# Patient Record
Sex: MALE | Race: BLACK OR AFRICAN AMERICAN | Employment: OTHER | URBAN - METROPOLITAN AREA
[De-identification: names, ages, dates, MRNs, and addresses within clinical notes are randomized per-mention and may not be internally consistent; named-entity substitution may affect disease eponyms.]

---

## 2017-01-01 ENCOUNTER — HOSPITAL ENCOUNTER (OUTPATIENT)
Dept: LAB | Age: 82
Discharge: HOME | End: 2017-06-29
Payer: MEDICARE

## 2017-01-01 ENCOUNTER — APPOINTMENT (OUTPATIENT)
Dept: CT IMAGING | Age: 82
DRG: 061 | End: 2017-01-01
Attending: EMERGENCY MEDICINE
Payer: MEDICARE

## 2017-01-01 ENCOUNTER — PATIENT OUTREACH (OUTPATIENT)
Dept: CARDIOLOGY CLINIC | Age: 82
End: 2017-01-01

## 2017-01-01 ENCOUNTER — HOSPITAL ENCOUNTER (INPATIENT)
Age: 82
LOS: 4 days | DRG: 061 | End: 2017-07-28
Attending: EMERGENCY MEDICINE | Admitting: INTERNAL MEDICINE
Payer: MEDICARE

## 2017-01-01 ENCOUNTER — HOSPITAL ENCOUNTER (INPATIENT)
Age: 82
LOS: 3 days | Discharge: HOME | DRG: 291 | End: 2017-04-12
Attending: EMERGENCY MEDICINE | Admitting: INTERNAL MEDICINE
Payer: MEDICARE

## 2017-01-01 ENCOUNTER — APPOINTMENT (OUTPATIENT)
Dept: CT IMAGING | Age: 82
DRG: 061 | End: 2017-01-01
Attending: PSYCHIATRY & NEUROLOGY
Payer: MEDICARE

## 2017-01-01 ENCOUNTER — APPOINTMENT (OUTPATIENT)
Dept: GENERAL RADIOLOGY | Age: 82
DRG: 291 | End: 2017-01-01
Attending: EMERGENCY MEDICINE
Payer: MEDICARE

## 2017-01-01 ENCOUNTER — TELEPHONE (OUTPATIENT)
Dept: INTERNAL MEDICINE CLINIC | Age: 82
End: 2017-01-01

## 2017-01-01 ENCOUNTER — APPOINTMENT (OUTPATIENT)
Dept: ULTRASOUND IMAGING | Age: 82
DRG: 291 | End: 2017-01-01
Attending: INTERNAL MEDICINE
Payer: MEDICARE

## 2017-01-01 ENCOUNTER — APPOINTMENT (OUTPATIENT)
Dept: GENERAL RADIOLOGY | Age: 82
DRG: 061 | End: 2017-01-01
Attending: INTERNAL MEDICINE
Payer: MEDICARE

## 2017-01-01 ENCOUNTER — CLINICAL SUPPORT (OUTPATIENT)
Dept: CARDIOLOGY CLINIC | Age: 82
End: 2017-01-01

## 2017-01-01 ENCOUNTER — APPOINTMENT (OUTPATIENT)
Dept: GENERAL RADIOLOGY | Age: 82
DRG: 061 | End: 2017-01-01
Attending: EMERGENCY MEDICINE
Payer: MEDICARE

## 2017-01-01 ENCOUNTER — HOSPICE ADMISSION (OUTPATIENT)
Dept: HOSPICE | Facility: HOSPICE | Age: 82
End: 2017-01-01

## 2017-01-01 ENCOUNTER — OFFICE VISIT (OUTPATIENT)
Dept: CARDIOLOGY CLINIC | Age: 82
End: 2017-01-01

## 2017-01-01 ENCOUNTER — OFFICE VISIT (OUTPATIENT)
Dept: INTERNAL MEDICINE CLINIC | Age: 82
End: 2017-01-01

## 2017-01-01 ENCOUNTER — APPOINTMENT (OUTPATIENT)
Dept: NUCLEAR MEDICINE | Age: 82
DRG: 291 | End: 2017-01-01
Attending: INTERNAL MEDICINE
Payer: MEDICARE

## 2017-01-01 ENCOUNTER — HOSPITAL ENCOUNTER (OUTPATIENT)
Dept: ULTRASOUND IMAGING | Age: 82
Discharge: HOME | End: 2017-01-20
Attending: INTERNAL MEDICINE
Payer: MEDICARE

## 2017-01-01 VITALS
OXYGEN SATURATION: 98 % | DIASTOLIC BLOOD PRESSURE: 70 MMHG | WEIGHT: 136 LBS | BODY MASS INDEX: 21.86 KG/M2 | SYSTOLIC BLOOD PRESSURE: 110 MMHG | RESPIRATION RATE: 18 BRPM | HEIGHT: 66 IN | HEART RATE: 80 BPM

## 2017-01-01 VITALS
TEMPERATURE: 98.2 F | HEIGHT: 66 IN | RESPIRATION RATE: 18 BRPM | OXYGEN SATURATION: 97 % | HEART RATE: 72 BPM | BODY MASS INDEX: 21.33 KG/M2 | DIASTOLIC BLOOD PRESSURE: 69 MMHG | WEIGHT: 132.72 LBS | SYSTOLIC BLOOD PRESSURE: 102 MMHG

## 2017-01-01 VITALS — WEIGHT: 136 LBS | BODY MASS INDEX: 21.95 KG/M2

## 2017-01-01 VITALS
RESPIRATION RATE: 18 BRPM | BODY MASS INDEX: 22.16 KG/M2 | OXYGEN SATURATION: 98 % | HEART RATE: 70 BPM | DIASTOLIC BLOOD PRESSURE: 65 MMHG | TEMPERATURE: 97.8 F | WEIGHT: 133 LBS | HEIGHT: 65 IN | SYSTOLIC BLOOD PRESSURE: 105 MMHG

## 2017-01-01 VITALS
BODY MASS INDEX: 21.94 KG/M2 | DIASTOLIC BLOOD PRESSURE: 54 MMHG | SYSTOLIC BLOOD PRESSURE: 94 MMHG | RESPIRATION RATE: 18 BRPM | WEIGHT: 136.5 LBS | HEART RATE: 68 BPM | OXYGEN SATURATION: 98 % | HEIGHT: 66 IN

## 2017-01-01 VITALS
BODY MASS INDEX: 22.02 KG/M2 | HEART RATE: 75 BPM | SYSTOLIC BLOOD PRESSURE: 90 MMHG | OXYGEN SATURATION: 98 % | WEIGHT: 137 LBS | RESPIRATION RATE: 16 BRPM | DIASTOLIC BLOOD PRESSURE: 40 MMHG | HEIGHT: 66 IN

## 2017-01-01 VITALS
OXYGEN SATURATION: 98 % | SYSTOLIC BLOOD PRESSURE: 98 MMHG | BODY MASS INDEX: 21.38 KG/M2 | WEIGHT: 133 LBS | RESPIRATION RATE: 18 BRPM | DIASTOLIC BLOOD PRESSURE: 61 MMHG | HEIGHT: 66 IN | HEART RATE: 76 BPM | TEMPERATURE: 97.7 F

## 2017-01-01 VITALS — BODY MASS INDEX: 21.95 KG/M2 | WEIGHT: 136 LBS

## 2017-01-01 VITALS
RESPIRATION RATE: 24 BRPM | DIASTOLIC BLOOD PRESSURE: 76 MMHG | TEMPERATURE: 96.5 F | HEART RATE: 130 BPM | HEIGHT: 69 IN | SYSTOLIC BLOOD PRESSURE: 132 MMHG | BODY MASS INDEX: 21.39 KG/M2 | WEIGHT: 144.4 LBS | OXYGEN SATURATION: 75 %

## 2017-01-01 DIAGNOSIS — I50.22 CHRONIC SYSTOLIC HF (HEART FAILURE) (HCC): Primary | ICD-10-CM

## 2017-01-01 DIAGNOSIS — R53.1 WEAKNESS: ICD-10-CM

## 2017-01-01 DIAGNOSIS — E55.9 VITAMIN D DEFICIENCY: ICD-10-CM

## 2017-01-01 DIAGNOSIS — I49.5 SSS (SICK SINUS SYNDROME) (HCC): Chronic | ICD-10-CM

## 2017-01-01 DIAGNOSIS — N18.3 CKD (CHRONIC KIDNEY DISEASE), STAGE 3 (MODERATE): ICD-10-CM

## 2017-01-01 DIAGNOSIS — E11.9 TYPE 2 DIABETES MELLITUS WITHOUT COMPLICATION, UNSPECIFIED LONG TERM INSULIN USE STATUS: ICD-10-CM

## 2017-01-01 DIAGNOSIS — I10 ESSENTIAL HYPERTENSION: ICD-10-CM

## 2017-01-01 DIAGNOSIS — Z95.0 PACEMAKER: ICD-10-CM

## 2017-01-01 DIAGNOSIS — Z71.89 GOALS OF CARE, COUNSELING/DISCUSSION: ICD-10-CM

## 2017-01-01 DIAGNOSIS — E78.2 MIXED HYPERLIPIDEMIA: ICD-10-CM

## 2017-01-01 DIAGNOSIS — I48.0 PAROXYSMAL ATRIAL FIBRILLATION (HCC): Primary | Chronic | ICD-10-CM

## 2017-01-01 DIAGNOSIS — N18.30 TYPE 2 DM WITH CKD STAGE 3 AND HYPERTENSION (HCC): ICD-10-CM

## 2017-01-01 DIAGNOSIS — B35.4 TINEA CORPORIS: Primary | ICD-10-CM

## 2017-01-01 DIAGNOSIS — I48.0 PAROXYSMAL ATRIAL FIBRILLATION (HCC): Chronic | ICD-10-CM

## 2017-01-01 DIAGNOSIS — E11.22 TYPE 2 DM WITH CKD STAGE 3 AND HYPERTENSION (HCC): ICD-10-CM

## 2017-01-01 DIAGNOSIS — I49.3 PVC (PREMATURE VENTRICULAR CONTRACTION): Chronic | ICD-10-CM

## 2017-01-01 DIAGNOSIS — R60.9 EDEMA, UNSPECIFIED TYPE: ICD-10-CM

## 2017-01-01 DIAGNOSIS — E11.42 DIABETIC PERIPHERAL NEUROPATHY ASSOCIATED WITH TYPE 2 DIABETES MELLITUS (HCC): ICD-10-CM

## 2017-01-01 DIAGNOSIS — Z71.89 ACP (ADVANCE CARE PLANNING): ICD-10-CM

## 2017-01-01 DIAGNOSIS — I63.9 CEREBROVASCULAR ACCIDENT (CVA), UNSPECIFIED MECHANISM (HCC): Primary | ICD-10-CM

## 2017-01-01 DIAGNOSIS — I48.91 ATRIAL FIBRILLATION, UNSPECIFIED TYPE (HCC): Chronic | ICD-10-CM

## 2017-01-01 DIAGNOSIS — I50.21 ACUTE SYSTOLIC CONGESTIVE HEART FAILURE (HCC): Primary | ICD-10-CM

## 2017-01-01 DIAGNOSIS — Z71.89 COUNSELING REGARDING ADVANCED CARE PLANNING AND GOALS OF CARE: ICD-10-CM

## 2017-01-01 DIAGNOSIS — R06.02 SHORTNESS OF BREATH: ICD-10-CM

## 2017-01-01 DIAGNOSIS — N18.4 CHRONIC KIDNEY DISEASE, STAGE IV (SEVERE) (HCC): ICD-10-CM

## 2017-01-01 DIAGNOSIS — I50.21 SYSTOLIC CHF, ACUTE (HCC): ICD-10-CM

## 2017-01-01 DIAGNOSIS — I12.9 TYPE 2 DM WITH CKD STAGE 3 AND HYPERTENSION (HCC): ICD-10-CM

## 2017-01-01 DIAGNOSIS — I48.0 PAF (PAROXYSMAL ATRIAL FIBRILLATION) (HCC): ICD-10-CM

## 2017-01-01 DIAGNOSIS — I49.5 SSS (SICK SINUS SYNDROME) (HCC): Primary | Chronic | ICD-10-CM

## 2017-01-01 DIAGNOSIS — Z95.0 PACEMAKER: Primary | ICD-10-CM

## 2017-01-01 DIAGNOSIS — R53.81 DEBILITY: ICD-10-CM

## 2017-01-01 DIAGNOSIS — I50.22 CHRONIC SYSTOLIC CONGESTIVE HEART FAILURE (HCC): ICD-10-CM

## 2017-01-01 DIAGNOSIS — J96.01 ACUTE RESPIRATORY FAILURE WITH HYPOXIA (HCC): ICD-10-CM

## 2017-01-01 LAB
ALBUMIN SERPL BCP-MCNC: 3.3 G/DL (ref 3.5–5)
ALBUMIN SERPL BCP-MCNC: 3.5 G/DL (ref 3.5–5)
ALBUMIN/GLOB SERPL: 0.9 {RATIO} (ref 1.1–2.2)
ALBUMIN/GLOB SERPL: 0.9 {RATIO} (ref 1.1–2.2)
ALP SERPL-CCNC: 109 U/L (ref 45–117)
ALP SERPL-CCNC: 116 U/L (ref 45–117)
ALT SERPL-CCNC: 34 U/L (ref 12–78)
ALT SERPL-CCNC: 43 U/L (ref 12–78)
ANION GAP BLD CALC-SCNC: 11 MMOL/L (ref 5–15)
ANION GAP BLD CALC-SCNC: 12 MMOL/L (ref 5–15)
ANION GAP BLD CALC-SCNC: 13 MMOL/L (ref 5–15)
ANION GAP BLD CALC-SCNC: 7 MMOL/L (ref 5–15)
ANION GAP BLD CALC-SCNC: 9 MMOL/L (ref 5–15)
APPEARANCE UR: CLEAR
APPEARANCE UR: CLEAR
APTT PPP: 27.3 SEC (ref 22.1–32.5)
ARTERIAL PATENCY WRIST A: YES
AST SERPL W P-5'-P-CCNC: 22 U/L (ref 15–37)
AST SERPL W P-5'-P-CCNC: 41 U/L (ref 15–37)
ATRIAL RATE: 58 BPM
ATRIAL RATE: 72 BPM
BACTERIA URNS QL MICRO: NEGATIVE /HPF
BACTERIA URNS QL MICRO: NEGATIVE /HPF
BASE DEFICIT BLDA-SCNC: 1.5 MMOL/L
BASOPHILS # BLD AUTO: 0 K/UL
BASOPHILS # BLD AUTO: 0 K/UL (ref 0–0.1)
BASOPHILS # BLD AUTO: 0 K/UL (ref 0–0.1)
BASOPHILS # BLD: 0 %
BASOPHILS # BLD: 0 % (ref 0–1)
BASOPHILS # BLD: 0 % (ref 0–1)
BDY SITE: ABNORMAL
BILIRUB SERPL-MCNC: 0.4 MG/DL (ref 0.2–1)
BILIRUB SERPL-MCNC: 0.8 MG/DL (ref 0.2–1)
BILIRUB UR QL: NEGATIVE
BILIRUB UR QL: NEGATIVE
BNP SERPL-MCNC: 4882 PG/ML (ref 0–450)
BREATHS.SPONTANEOUS ON VENT: 26
BUN SERPL-MCNC: 27 MG/DL (ref 6–20)
BUN SERPL-MCNC: 27 MG/DL (ref 6–20)
BUN SERPL-MCNC: 28 MG/DL (ref 6–20)
BUN SERPL-MCNC: 30 MG/DL (ref 6–20)
BUN SERPL-MCNC: 33 MG/DL (ref 6–20)
BUN SERPL-MCNC: 33 MG/DL (ref 6–20)
BUN SERPL-MCNC: 34 MG/DL (ref 10–36)
BUN SERPL-MCNC: 43 MG/DL (ref 6–20)
BUN/CREAT SERPL: 13 (ref 12–20)
BUN/CREAT SERPL: 14 (ref 12–20)
BUN/CREAT SERPL: 17 (ref 10–24)
BUN/CREAT SERPL: 19 (ref 12–20)
CALCIUM SERPL-MCNC: 8.4 MG/DL (ref 8.5–10.1)
CALCIUM SERPL-MCNC: 8.5 MG/DL (ref 8.5–10.1)
CALCIUM SERPL-MCNC: 8.5 MG/DL (ref 8.5–10.1)
CALCIUM SERPL-MCNC: 8.6 MG/DL (ref 8.5–10.1)
CALCIUM SERPL-MCNC: 8.7 MG/DL (ref 8.5–10.1)
CALCIUM SERPL-MCNC: 8.7 MG/DL (ref 8.5–10.1)
CALCIUM SERPL-MCNC: 8.8 MG/DL (ref 8.5–10.1)
CALCIUM SERPL-MCNC: 9.4 MG/DL (ref 8.6–10.2)
CALCULATED R AXIS, ECG10: 24 DEGREES
CALCULATED R AXIS, ECG10: 41 DEGREES
CALCULATED T AXIS, ECG11: -63 DEGREES
CALCULATED T AXIS, ECG11: -99 DEGREES
CHLORIDE SERPL-SCNC: 100 MMOL/L (ref 97–108)
CHLORIDE SERPL-SCNC: 103 MMOL/L (ref 96–106)
CHLORIDE SERPL-SCNC: 104 MMOL/L (ref 97–108)
CHLORIDE SERPL-SCNC: 105 MMOL/L (ref 97–108)
CHLORIDE SERPL-SCNC: 106 MMOL/L (ref 97–108)
CHLORIDE SERPL-SCNC: 107 MMOL/L (ref 97–108)
CHLORIDE SERPL-SCNC: 110 MMOL/L (ref 97–108)
CHLORIDE SERPL-SCNC: 112 MMOL/L (ref 97–108)
CK MB CFR SERPL CALC: 2.2 % (ref 0–2.5)
CK MB SERPL-MCNC: 2.5 NG/ML (ref 5–25)
CK SERPL-CCNC: 114 U/L (ref 39–308)
CO2 SERPL-SCNC: 19 MMOL/L (ref 21–32)
CO2 SERPL-SCNC: 22 MMOL/L (ref 21–32)
CO2 SERPL-SCNC: 23 MMOL/L (ref 18–29)
CO2 SERPL-SCNC: 23 MMOL/L (ref 21–32)
CO2 SERPL-SCNC: 23 MMOL/L (ref 21–32)
CO2 SERPL-SCNC: 25 MMOL/L (ref 21–32)
CO2 SERPL-SCNC: 27 MMOL/L (ref 21–32)
CO2 SERPL-SCNC: 29 MMOL/L (ref 21–32)
COLOR UR: ABNORMAL
COLOR UR: NORMAL
CREAT SERPL-MCNC: 1.91 MG/DL (ref 0.7–1.3)
CREAT SERPL-MCNC: 1.96 MG/DL (ref 0.7–1.3)
CREAT SERPL-MCNC: 2.05 MG/DL (ref 0.76–1.27)
CREAT SERPL-MCNC: 2.05 MG/DL (ref 0.7–1.3)
CREAT SERPL-MCNC: 2.1 MG/DL (ref 0.7–1.3)
CREAT SERPL-MCNC: 2.29 MG/DL (ref 0.7–1.3)
CREAT SERPL-MCNC: 2.33 MG/DL (ref 0.7–1.3)
CREAT SERPL-MCNC: 2.34 MG/DL (ref 0.7–1.3)
DIAGNOSIS, 93000: NORMAL
DIAGNOSIS, 93000: NORMAL
DIFFERENTIAL METHOD BLD: ABNORMAL
EOSINOPHIL # BLD: 0 K/UL
EOSINOPHIL # BLD: 0.1 K/UL (ref 0–0.4)
EOSINOPHIL # BLD: 0.1 K/UL (ref 0–0.4)
EOSINOPHIL NFR BLD: 0 %
EOSINOPHIL NFR BLD: 1 % (ref 0–7)
EOSINOPHIL NFR BLD: 1 % (ref 0–7)
EPITH CASTS URNS QL MICRO: ABNORMAL /LPF
EPITH CASTS URNS QL MICRO: NORMAL /LPF
ERYTHROCYTE [DISTWIDTH] IN BLOOD BY AUTOMATED COUNT: 14.9 % (ref 11.5–14.5)
ERYTHROCYTE [DISTWIDTH] IN BLOOD BY AUTOMATED COUNT: 15.1 % (ref 11.5–14.5)
ERYTHROCYTE [DISTWIDTH] IN BLOOD BY AUTOMATED COUNT: 15.1 % (ref 11.5–14.5)
ERYTHROCYTE [DISTWIDTH] IN BLOOD BY AUTOMATED COUNT: 15.2 % (ref 11.5–14.5)
FIO2 ON VENT: 100 %
GAS FLOW.O2 O2 DELIVERY SYS: 15 L/MIN
GLOBULIN SER CALC-MCNC: 3.7 G/DL (ref 2–4)
GLOBULIN SER CALC-MCNC: 3.8 G/DL (ref 2–4)
GLUCOSE BLD STRIP.AUTO-MCNC: 101 MG/DL (ref 65–100)
GLUCOSE BLD STRIP.AUTO-MCNC: 102 MG/DL (ref 65–100)
GLUCOSE BLD STRIP.AUTO-MCNC: 103 MG/DL (ref 65–100)
GLUCOSE BLD STRIP.AUTO-MCNC: 108 MG/DL (ref 65–100)
GLUCOSE BLD STRIP.AUTO-MCNC: 109 MG/DL (ref 65–100)
GLUCOSE BLD STRIP.AUTO-MCNC: 111 MG/DL (ref 65–100)
GLUCOSE BLD STRIP.AUTO-MCNC: 111 MG/DL (ref 65–100)
GLUCOSE BLD STRIP.AUTO-MCNC: 112 MG/DL (ref 65–100)
GLUCOSE BLD STRIP.AUTO-MCNC: 115 MG/DL (ref 65–100)
GLUCOSE BLD STRIP.AUTO-MCNC: 120 MG/DL (ref 65–100)
GLUCOSE BLD STRIP.AUTO-MCNC: 129 MG/DL (ref 65–100)
GLUCOSE BLD STRIP.AUTO-MCNC: 132 MG/DL (ref 65–100)
GLUCOSE BLD STRIP.AUTO-MCNC: 139 MG/DL (ref 65–100)
GLUCOSE BLD STRIP.AUTO-MCNC: 147 MG/DL (ref 65–100)
GLUCOSE BLD STRIP.AUTO-MCNC: 154 MG/DL (ref 65–100)
GLUCOSE BLD STRIP.AUTO-MCNC: 158 MG/DL (ref 65–100)
GLUCOSE BLD STRIP.AUTO-MCNC: 160 MG/DL (ref 65–100)
GLUCOSE BLD STRIP.AUTO-MCNC: 160 MG/DL (ref 65–100)
GLUCOSE BLD STRIP.AUTO-MCNC: 167 MG/DL (ref 65–100)
GLUCOSE BLD STRIP.AUTO-MCNC: 172 MG/DL (ref 65–100)
GLUCOSE BLD STRIP.AUTO-MCNC: 62 MG/DL (ref 65–100)
GLUCOSE BLD STRIP.AUTO-MCNC: 70 MG/DL (ref 65–100)
GLUCOSE BLD STRIP.AUTO-MCNC: 72 MG/DL (ref 65–100)
GLUCOSE BLD STRIP.AUTO-MCNC: 73 MG/DL (ref 65–100)
GLUCOSE BLD STRIP.AUTO-MCNC: 74 MG/DL (ref 65–100)
GLUCOSE BLD STRIP.AUTO-MCNC: 76 MG/DL (ref 65–100)
GLUCOSE BLD STRIP.AUTO-MCNC: 77 MG/DL (ref 65–100)
GLUCOSE BLD STRIP.AUTO-MCNC: 78 MG/DL (ref 65–100)
GLUCOSE BLD STRIP.AUTO-MCNC: 78 MG/DL (ref 65–100)
GLUCOSE BLD STRIP.AUTO-MCNC: 86 MG/DL (ref 65–100)
GLUCOSE BLD STRIP.AUTO-MCNC: 87 MG/DL (ref 65–100)
GLUCOSE BLD STRIP.AUTO-MCNC: 92 MG/DL (ref 65–100)
GLUCOSE BLD STRIP.AUTO-MCNC: 92 MG/DL (ref 65–100)
GLUCOSE BLD STRIP.AUTO-MCNC: 97 MG/DL (ref 65–100)
GLUCOSE BLD STRIP.AUTO-MCNC: 99 MG/DL (ref 65–100)
GLUCOSE SERPL-MCNC: 121 MG/DL (ref 65–99)
GLUCOSE SERPL-MCNC: 151 MG/DL (ref 65–100)
GLUCOSE SERPL-MCNC: 68 MG/DL (ref 65–100)
GLUCOSE SERPL-MCNC: 83 MG/DL (ref 65–100)
GLUCOSE SERPL-MCNC: 84 MG/DL (ref 65–100)
GLUCOSE SERPL-MCNC: 85 MG/DL (ref 65–100)
GLUCOSE SERPL-MCNC: 90 MG/DL (ref 65–100)
GLUCOSE SERPL-MCNC: 99 MG/DL (ref 65–100)
GLUCOSE UR STRIP.AUTO-MCNC: NEGATIVE MG/DL
GLUCOSE UR STRIP.AUTO-MCNC: NEGATIVE MG/DL
HCO3 BLDA-SCNC: 21 MMOL/L (ref 22–26)
HCT VFR BLD AUTO: 37.9 % (ref 36.6–50.3)
HCT VFR BLD AUTO: 39.5 % (ref 36.6–50.3)
HCT VFR BLD AUTO: 41.2 % (ref 36.6–50.3)
HCT VFR BLD AUTO: 41.4 % (ref 36.6–50.3)
HGB BLD-MCNC: 12.4 G/DL (ref 12.1–17)
HGB BLD-MCNC: 13.1 G/DL (ref 12.1–17)
HGB BLD-MCNC: 13.2 G/DL (ref 12.1–17)
HGB BLD-MCNC: 13.3 G/DL (ref 12.1–17)
HGB UR QL STRIP: ABNORMAL
HGB UR QL STRIP: NEGATIVE
HYALINE CASTS URNS QL MICRO: ABNORMAL /LPF (ref 0–5)
HYALINE CASTS URNS QL MICRO: NORMAL /LPF (ref 0–5)
INR PPP: 1.1 (ref 0.9–1.1)
INTERPRETATION: NORMAL
KETONES UR QL STRIP.AUTO: NEGATIVE MG/DL
KETONES UR QL STRIP.AUTO: NEGATIVE MG/DL
LACTATE SERPL-SCNC: 1.3 MMOL/L (ref 0.4–2)
LEUKOCYTE ESTERASE UR QL STRIP.AUTO: NEGATIVE
LEUKOCYTE ESTERASE UR QL STRIP.AUTO: NEGATIVE
LYMPHOCYTES # BLD AUTO: 17 %
LYMPHOCYTES # BLD AUTO: 18 % (ref 12–49)
LYMPHOCYTES # BLD AUTO: 37 % (ref 12–49)
LYMPHOCYTES # BLD: 1.4 K/UL (ref 0.8–3.5)
LYMPHOCYTES # BLD: 2.5 K/UL
LYMPHOCYTES # BLD: 2.8 K/UL (ref 0.8–3.5)
Lab: NORMAL
MAGNESIUM SERPL-MCNC: 2 MG/DL (ref 1.6–2.4)
MAGNESIUM SERPL-MCNC: 2.2 MG/DL (ref 1.6–2.3)
MAGNESIUM SERPL-MCNC: 2.3 MG/DL (ref 1.6–2.4)
MCH RBC QN AUTO: 26.7 PG (ref 26–34)
MCH RBC QN AUTO: 26.7 PG (ref 26–34)
MCH RBC QN AUTO: 26.9 PG (ref 26–34)
MCH RBC QN AUTO: 27.2 PG (ref 26–34)
MCHC RBC AUTO-ENTMCNC: 32 G/DL (ref 30–36.5)
MCHC RBC AUTO-ENTMCNC: 32.1 G/DL (ref 30–36.5)
MCHC RBC AUTO-ENTMCNC: 32.7 G/DL (ref 30–36.5)
MCHC RBC AUTO-ENTMCNC: 33.2 G/DL (ref 30–36.5)
MCV RBC AUTO: 81.7 FL (ref 80–99)
MCV RBC AUTO: 82.1 FL (ref 80–99)
MCV RBC AUTO: 83.4 FL (ref 80–99)
MCV RBC AUTO: 83.6 FL (ref 80–99)
METAMYELOCYTES NFR BLD MANUAL: 9 %
MONOCYTES # BLD: 0.4 K/UL
MONOCYTES # BLD: 0.5 K/UL (ref 0–1)
MONOCYTES # BLD: 1.4 K/UL (ref 0–1)
MONOCYTES NFR BLD AUTO: 18 % (ref 5–13)
MONOCYTES NFR BLD AUTO: 3 %
MONOCYTES NFR BLD AUTO: 7 % (ref 5–13)
NEUTS BAND NFR BLD MANUAL: 19 %
NEUTS SEG # BLD: 10.4 K/UL
NEUTS SEG # BLD: 4.1 K/UL (ref 1.8–8)
NEUTS SEG # BLD: 4.8 K/UL (ref 1.8–8)
NEUTS SEG NFR BLD AUTO: 52 %
NEUTS SEG NFR BLD AUTO: 55 % (ref 32–75)
NEUTS SEG NFR BLD AUTO: 63 % (ref 32–75)
NITRITE UR QL STRIP.AUTO: NEGATIVE
NITRITE UR QL STRIP.AUTO: NEGATIVE
NRBC # BLD: 0 K/UL (ref 0–0.01)
NRBC BLD-RTO: 0 PER 100 WBC
P-R INTERVAL, ECG05: 192 MS
P-R INTERVAL, ECG05: 224 MS
PCO2 BLDA: 31 MMHG (ref 35–45)
PH BLDA: 7.45 [PH] (ref 7.35–7.45)
PH UR STRIP: 5.5 [PH] (ref 5–8)
PH UR STRIP: 7 [PH] (ref 5–8)
PHOSPHATE SERPL-MCNC: 3.4 MG/DL (ref 2.6–4.7)
PLATELET # BLD AUTO: 148 K/UL (ref 150–400)
PLATELET # BLD AUTO: 163 K/UL (ref 150–400)
PLATELET # BLD AUTO: 173 K/UL (ref 150–400)
PLATELET # BLD AUTO: 182 K/UL (ref 150–400)
PO2 BLDA: 39 MMHG (ref 80–100)
POTASSIUM SERPL-SCNC: 3.5 MMOL/L (ref 3.5–5.1)
POTASSIUM SERPL-SCNC: 3.6 MMOL/L (ref 3.5–5.1)
POTASSIUM SERPL-SCNC: 3.7 MMOL/L (ref 3.5–5.1)
POTASSIUM SERPL-SCNC: 3.8 MMOL/L (ref 3.5–5.1)
POTASSIUM SERPL-SCNC: 3.9 MMOL/L (ref 3.5–5.1)
POTASSIUM SERPL-SCNC: 4.6 MMOL/L (ref 3.5–5.2)
PROT SERPL-MCNC: 7 G/DL (ref 6.4–8.2)
PROT SERPL-MCNC: 7.3 G/DL (ref 6.4–8.2)
PROT UR STRIP-MCNC: 30 MG/DL
PROT UR STRIP-MCNC: NEGATIVE MG/DL
PROTHROMBIN TIME: 10.9 SEC (ref 9–11.1)
Q-T INTERVAL, ECG07: 422 MS
Q-T INTERVAL, ECG07: 442 MS
QRS DURATION, ECG06: 116 MS
QRS DURATION, ECG06: 90 MS
QTC CALCULATION (BEZET), ECG08: 455 MS
QTC CALCULATION (BEZET), ECG08: 477 MS
RBC # BLD AUTO: 4.64 M/UL (ref 4.1–5.7)
RBC # BLD AUTO: 4.81 M/UL (ref 4.1–5.7)
RBC # BLD AUTO: 4.94 M/UL (ref 4.1–5.7)
RBC # BLD AUTO: 4.95 M/UL (ref 4.1–5.7)
RBC #/AREA URNS HPF: ABNORMAL /HPF (ref 0–5)
RBC #/AREA URNS HPF: NORMAL /HPF (ref 0–5)
RBC MORPH BLD: ABNORMAL
SAO2 % BLD: 76 % (ref 92–97)
SAO2% DEVICE SAO2% SENSOR NAME: ABNORMAL
SERVICE CMNT-IMP: ABNORMAL
SERVICE CMNT-IMP: NORMAL
SODIUM SERPL-SCNC: 136 MMOL/L (ref 136–145)
SODIUM SERPL-SCNC: 140 MMOL/L (ref 136–145)
SODIUM SERPL-SCNC: 140 MMOL/L (ref 136–145)
SODIUM SERPL-SCNC: 141 MMOL/L (ref 136–145)
SODIUM SERPL-SCNC: 142 MMOL/L (ref 136–145)
SODIUM SERPL-SCNC: 143 MMOL/L (ref 136–145)
SODIUM SERPL-SCNC: 144 MMOL/L (ref 136–145)
SODIUM SERPL-SCNC: 145 MMOL/L (ref 134–144)
SP GR UR REFRACTOMETRY: 1.01 (ref 1–1.03)
SP GR UR REFRACTOMETRY: 1.01 (ref 1–1.03)
SPECIMEN SITE: ABNORMAL
T4 FREE SERPL-MCNC: 1.8 NG/DL (ref 0.8–1.5)
THERAPEUTIC RANGE,PTTT: NORMAL SECS (ref 58–77)
TROPONIN I SERPL-MCNC: 0.05 NG/ML
TROPONIN I SERPL-MCNC: 0.09 NG/ML
TSH SERPL DL<=0.05 MIU/L-ACNC: 0.96 UIU/ML (ref 0.36–3.74)
UA: UC IF INDICATED,UAUC: ABNORMAL
UA: UC IF INDICATED,UAUC: NORMAL
UROBILINOGEN UR QL STRIP.AUTO: 1 EU/DL (ref 0.2–1)
UROBILINOGEN UR QL STRIP.AUTO: 1 EU/DL (ref 0.2–1)
VENTILATION MODE VENT: ABNORMAL
VENTRICULAR RATE, ECG03: 70 BPM
VENTRICULAR RATE, ECG03: 70 BPM
WBC # BLD AUTO: 14.7 K/UL (ref 4.1–11.1)
WBC # BLD AUTO: 6.6 K/UL (ref 4.1–11.1)
WBC # BLD AUTO: 7.4 K/UL (ref 4.1–11.1)
WBC # BLD AUTO: 7.8 K/UL (ref 4.1–11.1)
WBC MORPH BLD: ABNORMAL
WBC URNS QL MICRO: ABNORMAL /HPF (ref 0–4)
WBC URNS QL MICRO: NORMAL /HPF (ref 0–4)

## 2017-01-01 PROCEDURE — 82962 GLUCOSE BLOOD TEST: CPT

## 2017-01-01 PROCEDURE — 77030037878 HC DRSG MEPILEX >48IN BORD MOLN -B

## 2017-01-01 PROCEDURE — 94640 AIRWAY INHALATION TREATMENT: CPT

## 2017-01-01 PROCEDURE — 74011000258 HC RX REV CODE- 258: Performed by: INTERNAL MEDICINE

## 2017-01-01 PROCEDURE — 81001 URINALYSIS AUTO W/SCOPE: CPT | Performed by: INTERNAL MEDICINE

## 2017-01-01 PROCEDURE — 74011250636 HC RX REV CODE- 250/636: Performed by: INTERNAL MEDICINE

## 2017-01-01 PROCEDURE — 74011250637 HC RX REV CODE- 250/637: Performed by: INTERNAL MEDICINE

## 2017-01-01 PROCEDURE — 93005 ELECTROCARDIOGRAM TRACING: CPT

## 2017-01-01 PROCEDURE — 70496 CT ANGIOGRAPHY HEAD: CPT

## 2017-01-01 PROCEDURE — G8978 MOBILITY CURRENT STATUS: HCPCS

## 2017-01-01 PROCEDURE — 96374 THER/PROPH/DIAG INJ IV PUSH: CPT

## 2017-01-01 PROCEDURE — 71020 XR CHEST PA LAT: CPT

## 2017-01-01 PROCEDURE — 36415 COLL VENOUS BLD VENIPUNCTURE: CPT | Performed by: INTERNAL MEDICINE

## 2017-01-01 PROCEDURE — 74011000250 HC RX REV CODE- 250: Performed by: INTERNAL MEDICINE

## 2017-01-01 PROCEDURE — 71010 XR CHEST PORT: CPT

## 2017-01-01 PROCEDURE — 84484 ASSAY OF TROPONIN QUANT: CPT | Performed by: EMERGENCY MEDICINE

## 2017-01-01 PROCEDURE — 65610000006 HC RM INTENSIVE CARE

## 2017-01-01 PROCEDURE — 77030029684 HC NEB SM VOL KT MONA -A

## 2017-01-01 PROCEDURE — 80048 BASIC METABOLIC PNL TOTAL CA: CPT | Performed by: INTERNAL MEDICINE

## 2017-01-01 PROCEDURE — G8979 MOBILITY GOAL STATUS: HCPCS

## 2017-01-01 PROCEDURE — 77010033678 HC OXYGEN DAILY

## 2017-01-01 PROCEDURE — 74011250636 HC RX REV CODE- 250/636: Performed by: EMERGENCY MEDICINE

## 2017-01-01 PROCEDURE — 97116 GAIT TRAINING THERAPY: CPT

## 2017-01-01 PROCEDURE — 36415 COLL VENOUS BLD VENIPUNCTURE: CPT

## 2017-01-01 PROCEDURE — 82550 ASSAY OF CK (CPK): CPT | Performed by: EMERGENCY MEDICINE

## 2017-01-01 PROCEDURE — 74011250636 HC RX REV CODE- 250/636: Performed by: NURSE PRACTITIONER

## 2017-01-01 PROCEDURE — 65660000000 HC RM CCU STEPDOWN

## 2017-01-01 PROCEDURE — 70450 CT HEAD/BRAIN W/O DYE: CPT

## 2017-01-01 PROCEDURE — 76450000000

## 2017-01-01 PROCEDURE — 76770 US EXAM ABDO BACK WALL COMP: CPT

## 2017-01-01 PROCEDURE — 74011250636 HC RX REV CODE- 250/636: Performed by: PHYSICAL MEDICINE & REHABILITATION

## 2017-01-01 PROCEDURE — 74011000258 HC RX REV CODE- 258: Performed by: NURSE PRACTITIONER

## 2017-01-01 PROCEDURE — 74011636320 HC RX REV CODE- 636/320

## 2017-01-01 PROCEDURE — 80053 COMPREHEN METABOLIC PANEL: CPT | Performed by: EMERGENCY MEDICINE

## 2017-01-01 PROCEDURE — 85730 THROMBOPLASTIN TIME PARTIAL: CPT | Performed by: EMERGENCY MEDICINE

## 2017-01-01 PROCEDURE — 83735 ASSAY OF MAGNESIUM: CPT

## 2017-01-01 PROCEDURE — 80048 BASIC METABOLIC PNL TOTAL CA: CPT

## 2017-01-01 PROCEDURE — 77030010547 HC BG URIN W/UMETER -A

## 2017-01-01 PROCEDURE — 85610 PROTHROMBIN TIME: CPT | Performed by: EMERGENCY MEDICINE

## 2017-01-01 PROCEDURE — 3E03317 INTRODUCTION OF OTHER THROMBOLYTIC INTO PERIPHERAL VEIN, PERCUTANEOUS APPROACH: ICD-10-PCS | Performed by: INTERNAL MEDICINE

## 2017-01-01 PROCEDURE — 99285 EMERGENCY DEPT VISIT HI MDM: CPT

## 2017-01-01 PROCEDURE — 85025 COMPLETE CBC W/AUTO DIFF WBC: CPT | Performed by: INTERNAL MEDICINE

## 2017-01-01 PROCEDURE — 83735 ASSAY OF MAGNESIUM: CPT | Performed by: INTERNAL MEDICINE

## 2017-01-01 PROCEDURE — 83605 ASSAY OF LACTIC ACID: CPT | Performed by: EMERGENCY MEDICINE

## 2017-01-01 PROCEDURE — 77030032490 HC SLV COMPR SCD KNE COVD -B

## 2017-01-01 PROCEDURE — 93970 EXTREMITY STUDY: CPT

## 2017-01-01 PROCEDURE — 84100 ASSAY OF PHOSPHORUS: CPT | Performed by: INTERNAL MEDICINE

## 2017-01-01 PROCEDURE — 93306 TTE W/DOPPLER COMPLETE: CPT

## 2017-01-01 PROCEDURE — 82803 BLOOD GASES ANY COMBINATION: CPT | Performed by: INTERNAL MEDICINE

## 2017-01-01 PROCEDURE — 74011250637 HC RX REV CODE- 250/637: Performed by: HOSPITALIST

## 2017-01-01 PROCEDURE — 97535 SELF CARE MNGMENT TRAINING: CPT

## 2017-01-01 PROCEDURE — 96361 HYDRATE IV INFUSION ADD-ON: CPT

## 2017-01-01 PROCEDURE — 97165 OT EVAL LOW COMPLEX 30 MIN: CPT

## 2017-01-01 PROCEDURE — 84439 ASSAY OF FREE THYROXINE: CPT | Performed by: FAMILY MEDICINE

## 2017-01-01 PROCEDURE — 85025 COMPLETE CBC W/AUTO DIFF WBC: CPT | Performed by: EMERGENCY MEDICINE

## 2017-01-01 PROCEDURE — 77030005538 HC CATH URETH FOL44 BARD -B

## 2017-01-01 PROCEDURE — 74011636637 HC RX REV CODE- 636/637: Performed by: INTERNAL MEDICINE

## 2017-01-01 PROCEDURE — 36600 WITHDRAWAL OF ARTERIAL BLOOD: CPT | Performed by: INTERNAL MEDICINE

## 2017-01-01 PROCEDURE — 77030012879 HC MSK CPAP FLL FAC PHIL -B

## 2017-01-01 PROCEDURE — 99284 EMERGENCY DEPT VISIT MOD MDM: CPT

## 2017-01-01 PROCEDURE — 84443 ASSAY THYROID STIM HORMONE: CPT | Performed by: FAMILY MEDICINE

## 2017-01-01 PROCEDURE — 77030013160 HC PROTCT ARM THMB DERY -A

## 2017-01-01 PROCEDURE — 74011000250 HC RX REV CODE- 250: Performed by: PHYSICAL MEDICINE & REHABILITATION

## 2017-01-01 PROCEDURE — G8987 SELF CARE CURRENT STATUS: HCPCS

## 2017-01-01 PROCEDURE — 74011250636 HC RX REV CODE- 250/636: Performed by: HOSPITALIST

## 2017-01-01 PROCEDURE — 97162 PT EVAL MOD COMPLEX 30 MIN: CPT

## 2017-01-01 PROCEDURE — 83735 ASSAY OF MAGNESIUM: CPT | Performed by: EMERGENCY MEDICINE

## 2017-01-01 PROCEDURE — 81001 URINALYSIS AUTO W/SCOPE: CPT | Performed by: EMERGENCY MEDICINE

## 2017-01-01 PROCEDURE — 36415 COLL VENOUS BLD VENIPUNCTURE: CPT | Performed by: EMERGENCY MEDICINE

## 2017-01-01 PROCEDURE — 94762 N-INVAS EAR/PLS OXIMTRY CONT: CPT

## 2017-01-01 PROCEDURE — A9558 XE133 XENON 10MCI: HCPCS

## 2017-01-01 PROCEDURE — 74011250636 HC RX REV CODE- 250/636

## 2017-01-01 PROCEDURE — 83880 ASSAY OF NATRIURETIC PEPTIDE: CPT | Performed by: EMERGENCY MEDICINE

## 2017-01-01 PROCEDURE — 85027 COMPLETE CBC AUTOMATED: CPT | Performed by: INTERNAL MEDICINE

## 2017-01-01 PROCEDURE — 97116 GAIT TRAINING THERAPY: CPT | Performed by: PHYSICAL THERAPIST

## 2017-01-01 RX ORDER — BISACODYL 5 MG
5 TABLET, DELAYED RELEASE (ENTERIC COATED) ORAL DAILY PRN
Status: DISCONTINUED | OUTPATIENT
Start: 2017-01-01 | End: 2017-01-01 | Stop reason: HOSPADM

## 2017-01-01 RX ORDER — SODIUM CHLORIDE 9 MG/ML
150 INJECTION, SOLUTION INTRAVENOUS CONTINUOUS
Status: DISCONTINUED | OUTPATIENT
Start: 2017-01-01 | End: 2017-01-01

## 2017-01-01 RX ORDER — CLOTRIMAZOLE AND BETAMETHASONE DIPROPIONATE 10; .64 MG/G; MG/G
CREAM TOPICAL 2 TIMES DAILY
Qty: 15 G | Refills: 0 | Status: SHIPPED | OUTPATIENT
Start: 2017-01-01 | End: 2017-01-01

## 2017-01-01 RX ORDER — DEXTROSE 50 % IN WATER (D50W) INTRAVENOUS SYRINGE
12.5-25 AS NEEDED
Status: DISCONTINUED | OUTPATIENT
Start: 2017-01-01 | End: 2017-01-01

## 2017-01-01 RX ORDER — OXYCODONE AND ACETAMINOPHEN 5; 325 MG/1; MG/1
1 TABLET ORAL
Status: DISCONTINUED | OUTPATIENT
Start: 2017-01-01 | End: 2017-01-01 | Stop reason: HOSPADM

## 2017-01-01 RX ORDER — MUPIROCIN 20 MG/G
OINTMENT TOPICAL 2 TIMES DAILY
Status: DISCONTINUED | OUTPATIENT
Start: 2017-01-01 | End: 2017-01-01

## 2017-01-01 RX ORDER — FUROSEMIDE 10 MG/ML
80 INJECTION INTRAMUSCULAR; INTRAVENOUS EVERY 12 HOURS
Status: DISCONTINUED | OUTPATIENT
Start: 2017-01-01 | End: 2017-01-01

## 2017-01-01 RX ORDER — SODIUM CHLORIDE 0.9 % (FLUSH) 0.9 %
10 SYRINGE (ML) INJECTION
Status: ACTIVE | OUTPATIENT
Start: 2017-01-01 | End: 2017-01-01

## 2017-01-01 RX ORDER — GLYCOPYRROLATE 0.2 MG/ML
0.2 INJECTION INTRAMUSCULAR; INTRAVENOUS EVERY 4 HOURS
Status: DISCONTINUED | OUTPATIENT
Start: 2017-01-01 | End: 2017-01-01 | Stop reason: HOSPADM

## 2017-01-01 RX ORDER — SODIUM CHLORIDE 9 MG/ML
50 INJECTION, SOLUTION INTRAVENOUS
Status: DISPENSED | OUTPATIENT
Start: 2017-01-01 | End: 2017-01-01

## 2017-01-01 RX ORDER — DEXTROSE 50 % IN WATER (D50W) INTRAVENOUS SYRINGE
12.5-25 AS NEEDED
Status: DISCONTINUED | OUTPATIENT
Start: 2017-01-01 | End: 2017-01-01 | Stop reason: HOSPADM

## 2017-01-01 RX ORDER — ATORVASTATIN CALCIUM 20 MG/1
20 TABLET, FILM COATED ORAL DAILY
Status: DISCONTINUED | OUTPATIENT
Start: 2017-01-01 | End: 2017-01-01 | Stop reason: HOSPADM

## 2017-01-01 RX ORDER — DEXTROSE MONOHYDRATE AND SODIUM CHLORIDE 5; .45 G/100ML; G/100ML
25 INJECTION, SOLUTION INTRAVENOUS CONTINUOUS
Status: DISCONTINUED | OUTPATIENT
Start: 2017-01-01 | End: 2017-01-01 | Stop reason: HOSPADM

## 2017-01-01 RX ORDER — FUROSEMIDE 10 MG/ML
20 INJECTION INTRAMUSCULAR; INTRAVENOUS 2 TIMES DAILY
Status: DISCONTINUED | OUTPATIENT
Start: 2017-01-01 | End: 2017-01-01

## 2017-01-01 RX ORDER — LABETALOL HYDROCHLORIDE 5 MG/ML
10 INJECTION, SOLUTION INTRAVENOUS
Status: DISCONTINUED | OUTPATIENT
Start: 2017-01-01 | End: 2017-01-01 | Stop reason: SDUPTHER

## 2017-01-01 RX ORDER — NALOXONE HYDROCHLORIDE 0.4 MG/ML
0.4 INJECTION, SOLUTION INTRAMUSCULAR; INTRAVENOUS; SUBCUTANEOUS AS NEEDED
Status: DISCONTINUED | OUTPATIENT
Start: 2017-01-01 | End: 2017-01-01 | Stop reason: HOSPADM

## 2017-01-01 RX ORDER — SODIUM CHLORIDE 9 MG/ML
75 INJECTION, SOLUTION INTRAVENOUS CONTINUOUS
Status: DISCONTINUED | OUTPATIENT
Start: 2017-01-01 | End: 2017-01-01

## 2017-01-01 RX ORDER — ACETAMINOPHEN 500 MG
500 TABLET ORAL
Status: DISCONTINUED | OUTPATIENT
Start: 2017-01-01 | End: 2017-01-01 | Stop reason: HOSPADM

## 2017-01-01 RX ORDER — SODIUM CHLORIDE 0.9 % (FLUSH) 0.9 %
5 SYRINGE (ML) INJECTION EVERY 8 HOURS
Status: DISCONTINUED | OUTPATIENT
Start: 2017-01-01 | End: 2017-01-01

## 2017-01-01 RX ORDER — SODIUM CHLORIDE 0.9 % (FLUSH) 0.9 %
5-10 SYRINGE (ML) INJECTION EVERY 8 HOURS
Status: DISCONTINUED | OUTPATIENT
Start: 2017-01-01 | End: 2017-01-01

## 2017-01-01 RX ORDER — HALOPERIDOL 5 MG/ML
2 INJECTION INTRAMUSCULAR
Status: DISCONTINUED | OUTPATIENT
Start: 2017-01-01 | End: 2017-01-01

## 2017-01-01 RX ORDER — DEXTROSE MONOHYDRATE 100 MG/ML
125-250 INJECTION, SOLUTION INTRAVENOUS AS NEEDED
Status: DISCONTINUED | OUTPATIENT
Start: 2017-01-01 | End: 2017-01-01

## 2017-01-01 RX ORDER — INSULIN LISPRO 100 [IU]/ML
INJECTION, SOLUTION INTRAVENOUS; SUBCUTANEOUS
Status: DISCONTINUED | OUTPATIENT
Start: 2017-01-01 | End: 2017-01-01 | Stop reason: HOSPADM

## 2017-01-01 RX ORDER — ATORVASTATIN CALCIUM 20 MG/1
20 TABLET, FILM COATED ORAL DAILY
Qty: 90 TAB | Refills: 3 | Status: SHIPPED | OUTPATIENT
Start: 2017-01-01

## 2017-01-01 RX ORDER — FUROSEMIDE 20 MG/1
20 TABLET ORAL 2 TIMES DAILY
Status: DISCONTINUED | OUTPATIENT
Start: 2017-01-01 | End: 2017-01-01 | Stop reason: HOSPADM

## 2017-01-01 RX ORDER — AMIODARONE HYDROCHLORIDE 200 MG/1
200 TABLET ORAL DAILY
Status: DISCONTINUED | OUTPATIENT
Start: 2017-01-01 | End: 2017-01-01

## 2017-01-01 RX ORDER — CLOTRIMAZOLE AND BETAMETHASONE DIPROPIONATE 10; .64 MG/G; MG/G
CREAM TOPICAL
Refills: 0 | COMMUNITY
Start: 2017-01-01

## 2017-01-01 RX ORDER — INSULIN LISPRO 100 [IU]/ML
INJECTION, SOLUTION INTRAVENOUS; SUBCUTANEOUS EVERY 6 HOURS
Status: DISCONTINUED | OUTPATIENT
Start: 2017-01-01 | End: 2017-01-01

## 2017-01-01 RX ORDER — SODIUM CHLORIDE 0.9 % (FLUSH) 0.9 %
5 SYRINGE (ML) INJECTION AS NEEDED
Status: DISCONTINUED | OUTPATIENT
Start: 2017-01-01 | End: 2017-01-01 | Stop reason: SDUPTHER

## 2017-01-01 RX ORDER — FUROSEMIDE 10 MG/ML
40 INJECTION INTRAMUSCULAR; INTRAVENOUS ONCE
Status: COMPLETED | OUTPATIENT
Start: 2017-01-01 | End: 2017-01-01

## 2017-01-01 RX ORDER — CHLORTHALIDONE 50 MG/1
TABLET ORAL
Qty: 90 TAB | Refills: 0 | Status: SHIPPED | OUTPATIENT
Start: 2017-01-01 | End: 2017-01-01

## 2017-01-01 RX ORDER — HALOPERIDOL 5 MG/ML
INJECTION INTRAMUSCULAR
Status: DISPENSED
Start: 2017-01-01 | End: 2017-01-01

## 2017-01-01 RX ORDER — HALOPERIDOL 5 MG/ML
4 INJECTION INTRAMUSCULAR
Status: DISCONTINUED | OUTPATIENT
Start: 2017-01-01 | End: 2017-01-01 | Stop reason: HOSPADM

## 2017-01-01 RX ORDER — VALSARTAN 80 MG/1
80 TABLET ORAL DAILY
Qty: 90 TAB | Refills: 3 | Status: SHIPPED | OUTPATIENT
Start: 2017-01-01

## 2017-01-01 RX ORDER — SODIUM CHLORIDE 0.9 % (FLUSH) 0.9 %
5-10 SYRINGE (ML) INJECTION EVERY 8 HOURS
Status: DISCONTINUED | OUTPATIENT
Start: 2017-01-01 | End: 2017-01-01 | Stop reason: HOSPADM

## 2017-01-01 RX ORDER — IPRATROPIUM BROMIDE AND ALBUTEROL SULFATE 2.5; .5 MG/3ML; MG/3ML
3 SOLUTION RESPIRATORY (INHALATION)
Status: DISCONTINUED | OUTPATIENT
Start: 2017-01-01 | End: 2017-01-01 | Stop reason: HOSPADM

## 2017-01-01 RX ORDER — MAGNESIUM SULFATE 100 %
4 CRYSTALS MISCELLANEOUS AS NEEDED
Status: DISCONTINUED | OUTPATIENT
Start: 2017-01-01 | End: 2017-01-01 | Stop reason: HOSPADM

## 2017-01-01 RX ORDER — SODIUM CHLORIDE 0.9 % (FLUSH) 0.9 %
5-10 SYRINGE (ML) INJECTION AS NEEDED
Status: DISCONTINUED | OUTPATIENT
Start: 2017-01-01 | End: 2017-01-01 | Stop reason: SDUPTHER

## 2017-01-01 RX ORDER — SODIUM CHLORIDE 0.9 % (FLUSH) 0.9 %
5-10 SYRINGE (ML) INJECTION AS NEEDED
Status: DISCONTINUED | OUTPATIENT
Start: 2017-01-01 | End: 2017-01-01 | Stop reason: HOSPADM

## 2017-01-01 RX ORDER — METOPROLOL TARTRATE 25 MG/1
12.5 TABLET, FILM COATED ORAL 2 TIMES DAILY
Status: DISCONTINUED | OUTPATIENT
Start: 2017-01-01 | End: 2017-01-01 | Stop reason: HOSPADM

## 2017-01-01 RX ORDER — LORAZEPAM 2 MG/ML
0.5 INJECTION INTRAMUSCULAR
Status: DISCONTINUED | OUTPATIENT
Start: 2017-01-01 | End: 2017-01-01 | Stop reason: HOSPADM

## 2017-01-01 RX ORDER — LABETALOL HYDROCHLORIDE 5 MG/ML
10 INJECTION, SOLUTION INTRAVENOUS
Status: DISCONTINUED | OUTPATIENT
Start: 2017-01-01 | End: 2017-01-01

## 2017-01-01 RX ORDER — MAGNESIUM SULFATE 100 %
4 CRYSTALS MISCELLANEOUS AS NEEDED
Status: DISCONTINUED | OUTPATIENT
Start: 2017-01-01 | End: 2017-01-01

## 2017-01-01 RX ORDER — CLOTRIMAZOLE AND BETAMETHASONE DIPROPIONATE 10; .64 MG/G; MG/G
CREAM TOPICAL 2 TIMES DAILY
Status: DISCONTINUED | OUTPATIENT
Start: 2017-01-01 | End: 2017-01-01 | Stop reason: HOSPADM

## 2017-01-01 RX ORDER — IPRATROPIUM BROMIDE AND ALBUTEROL SULFATE 2.5; .5 MG/3ML; MG/3ML
3 SOLUTION RESPIRATORY (INHALATION)
Status: DISCONTINUED | OUTPATIENT
Start: 2017-01-01 | End: 2017-01-01

## 2017-01-01 RX ORDER — MELATONIN
4000 DAILY
Status: DISCONTINUED | OUTPATIENT
Start: 2017-01-01 | End: 2017-01-01 | Stop reason: HOSPADM

## 2017-01-01 RX ORDER — HEPARIN SODIUM 5000 [USP'U]/ML
5000 INJECTION, SOLUTION INTRAVENOUS; SUBCUTANEOUS EVERY 8 HOURS
Status: DISCONTINUED | OUTPATIENT
Start: 2017-01-01 | End: 2017-01-01 | Stop reason: HOSPADM

## 2017-01-01 RX ORDER — MORPHINE SULFATE 2 MG/ML
1 INJECTION, SOLUTION INTRAMUSCULAR; INTRAVENOUS
Status: DISCONTINUED | OUTPATIENT
Start: 2017-01-01 | End: 2017-01-01 | Stop reason: HOSPADM

## 2017-01-01 RX ORDER — ASPIRIN 300 MG/1
300 SUPPOSITORY RECTAL DAILY
Status: DISCONTINUED | OUTPATIENT
Start: 2017-01-01 | End: 2017-01-01

## 2017-01-01 RX ORDER — ONDANSETRON 2 MG/ML
4 INJECTION INTRAMUSCULAR; INTRAVENOUS
Status: DISCONTINUED | OUTPATIENT
Start: 2017-01-01 | End: 2017-01-01 | Stop reason: HOSPADM

## 2017-01-01 RX ORDER — FUROSEMIDE 40 MG/1
TABLET ORAL
Qty: 60 TAB | Refills: 12 | Status: SHIPPED | OUTPATIENT
Start: 2017-01-01 | End: 2017-01-01

## 2017-01-01 RX ORDER — FUROSEMIDE 10 MG/ML
20 INJECTION INTRAMUSCULAR; INTRAVENOUS
Status: COMPLETED | OUTPATIENT
Start: 2017-01-01 | End: 2017-01-01

## 2017-01-01 RX ORDER — INSULIN LISPRO 100 [IU]/ML
INJECTION, SOLUTION INTRAVENOUS; SUBCUTANEOUS
Status: DISCONTINUED | OUTPATIENT
Start: 2017-01-01 | End: 2017-01-01

## 2017-01-01 RX ORDER — FUROSEMIDE 40 MG/1
40 TABLET ORAL DAILY
Qty: 30 TAB | Refills: 12 | Status: SHIPPED | OUTPATIENT
Start: 2017-01-01

## 2017-01-01 RX ORDER — MORPHINE SULFATE 4 MG/ML
2-4 INJECTION, SOLUTION INTRAMUSCULAR; INTRAVENOUS
Status: DISCONTINUED | OUTPATIENT
Start: 2017-01-01 | End: 2017-01-01 | Stop reason: HOSPADM

## 2017-01-01 RX ORDER — SODIUM CHLORIDE 450 MG/100ML
25 INJECTION, SOLUTION INTRAVENOUS CONTINUOUS
Status: DISCONTINUED | OUTPATIENT
Start: 2017-01-01 | End: 2017-01-01

## 2017-01-01 RX ADMIN — VITAMIN D, TAB 1000IU (100/BT) 4000 UNITS: 25 TAB at 11:58

## 2017-01-01 RX ADMIN — GLYCOPYRROLATE 0.2 MG: 0.2 INJECTION, SOLUTION INTRAMUSCULAR; INTRAVENOUS at 21:00

## 2017-01-01 RX ADMIN — HALOPERIDOL LACTATE 2 MG: 5 INJECTION, SOLUTION INTRAMUSCULAR at 15:55

## 2017-01-01 RX ADMIN — ATORVASTATIN CALCIUM 20 MG: 20 TABLET, FILM COATED ORAL at 11:00

## 2017-01-01 RX ADMIN — METOPROLOL TARTRATE 12.5 MG: 25 TABLET ORAL at 17:16

## 2017-01-01 RX ADMIN — VITAMIN D, TAB 1000IU (100/BT) 4000 UNITS: 25 TAB at 10:13

## 2017-01-01 RX ADMIN — Medication 10 ML: at 06:38

## 2017-01-01 RX ADMIN — Medication 5 ML: at 06:06

## 2017-01-01 RX ADMIN — CLOTRIMAZOLE AND BETAMETHASONE DIPROPIONATE: 10; .5 CREAM TOPICAL at 11:13

## 2017-01-01 RX ADMIN — Medication 5 ML: at 14:00

## 2017-01-01 RX ADMIN — Medication 10 ML: at 03:00

## 2017-01-01 RX ADMIN — FUROSEMIDE 20 MG: 20 TABLET ORAL at 09:46

## 2017-01-01 RX ADMIN — DEXTROSE MONOHYDRATE 125 ML: 10 INJECTION, SOLUTION INTRAVENOUS at 00:04

## 2017-01-01 RX ADMIN — SODIUM CHLORIDE 75 ML/HR: 900 INJECTION, SOLUTION INTRAVENOUS at 19:00

## 2017-01-01 RX ADMIN — DEXTROSE MONOHYDRATE AND SODIUM CHLORIDE 25 ML/HR: 5; .45 INJECTION, SOLUTION INTRAVENOUS at 10:12

## 2017-01-01 RX ADMIN — IPRATROPIUM BROMIDE AND ALBUTEROL SULFATE 3 ML: .5; 3 SOLUTION RESPIRATORY (INHALATION) at 07:31

## 2017-01-01 RX ADMIN — METOPROLOL TARTRATE 12.5 MG: 25 TABLET ORAL at 17:50

## 2017-01-01 RX ADMIN — IPRATROPIUM BROMIDE AND ALBUTEROL SULFATE 3 ML: .5; 3 SOLUTION RESPIRATORY (INHALATION) at 15:50

## 2017-01-01 RX ADMIN — HEPARIN SODIUM 5000 UNITS: 5000 INJECTION, SOLUTION INTRAVENOUS; SUBCUTANEOUS at 09:46

## 2017-01-01 RX ADMIN — AMIODARONE HYDROCHLORIDE 150 MG: 50 INJECTION, SOLUTION INTRAVENOUS at 10:11

## 2017-01-01 RX ADMIN — IPRATROPIUM BROMIDE AND ALBUTEROL SULFATE 3 ML: .5; 3 SOLUTION RESPIRATORY (INHALATION) at 11:29

## 2017-01-01 RX ADMIN — GLYCOPYRROLATE 0.2 MG: 0.2 INJECTION, SOLUTION INTRAMUSCULAR; INTRAVENOUS at 08:40

## 2017-01-01 RX ADMIN — IPRATROPIUM BROMIDE AND ALBUTEROL SULFATE 3 ML: .5; 3 SOLUTION RESPIRATORY (INHALATION) at 19:29

## 2017-01-01 RX ADMIN — SODIUM CHLORIDE 1000 ML: 900 INJECTION, SOLUTION INTRAVENOUS at 17:30

## 2017-01-01 RX ADMIN — INSULIN LISPRO 2 UNITS: 100 INJECTION, SOLUTION INTRAVENOUS; SUBCUTANEOUS at 12:12

## 2017-01-01 RX ADMIN — PIPERACILLIN AND TAZOBACTAM 4.5 G: 4; .5 INJECTION, POWDER, LYOPHILIZED, FOR SOLUTION INTRAVENOUS; PARENTERAL at 21:35

## 2017-01-01 RX ADMIN — MUPIROCIN: 20 OINTMENT TOPICAL at 17:25

## 2017-01-01 RX ADMIN — Medication 10 ML: at 15:33

## 2017-01-01 RX ADMIN — Medication 10 ML: at 12:00

## 2017-01-01 RX ADMIN — IPRATROPIUM BROMIDE AND ALBUTEROL SULFATE 3 ML: .5; 3 SOLUTION RESPIRATORY (INHALATION) at 15:40

## 2017-01-01 RX ADMIN — SODIUM CHLORIDE 75 ML/HR: 900 INJECTION, SOLUTION INTRAVENOUS at 01:20

## 2017-01-01 RX ADMIN — VITAMIN D, TAB 1000IU (100/BT) 4000 UNITS: 25 TAB at 09:47

## 2017-01-01 RX ADMIN — PIPERACILLIN AND TAZOBACTAM 4.5 G: 4; .5 INJECTION, POWDER, LYOPHILIZED, FOR SOLUTION INTRAVENOUS; PARENTERAL at 08:38

## 2017-01-01 RX ADMIN — FUROSEMIDE 20 MG: 20 TABLET ORAL at 17:49

## 2017-01-01 RX ADMIN — Medication 10 ML: at 14:50

## 2017-01-01 RX ADMIN — Medication 10 ML: at 21:24

## 2017-01-01 RX ADMIN — SODIUM CHLORIDE 75 ML/HR: 900 INJECTION, SOLUTION INTRAVENOUS at 15:07

## 2017-01-01 RX ADMIN — FUROSEMIDE 20 MG: 10 INJECTION, SOLUTION INTRAMUSCULAR; INTRAVENOUS at 11:58

## 2017-01-01 RX ADMIN — AMIODARONE HYDROCHLORIDE 0.5 MG/MIN: 50 INJECTION, SOLUTION INTRAVENOUS at 08:57

## 2017-01-01 RX ADMIN — GLYCOPYRROLATE 0.2 MG: 0.2 INJECTION, SOLUTION INTRAMUSCULAR; INTRAVENOUS at 23:35

## 2017-01-01 RX ADMIN — HALOPERIDOL LACTATE 4 MG: 5 INJECTION, SOLUTION INTRAMUSCULAR at 11:27

## 2017-01-01 RX ADMIN — VITAMIN D, TAB 1000IU (100/BT) 4000 UNITS: 25 TAB at 10:59

## 2017-01-01 RX ADMIN — FUROSEMIDE 20 MG: 20 TABLET ORAL at 10:14

## 2017-01-01 RX ADMIN — FUROSEMIDE 20 MG: 10 INJECTION, SOLUTION INTRAMUSCULAR; INTRAVENOUS at 18:13

## 2017-01-01 RX ADMIN — MORPHINE SULFATE 2 MG: 4 INJECTION, SOLUTION INTRAMUSCULAR; INTRAVENOUS at 18:40

## 2017-01-01 RX ADMIN — AMIODARONE HYDROCHLORIDE 150 MG: 50 INJECTION, SOLUTION INTRAVENOUS at 11:29

## 2017-01-01 RX ADMIN — METOPROLOL TARTRATE 12.5 MG: 25 TABLET ORAL at 09:46

## 2017-01-01 RX ADMIN — HEPARIN SODIUM 5000 UNITS: 5000 INJECTION, SOLUTION INTRAVENOUS; SUBCUTANEOUS at 17:28

## 2017-01-01 RX ADMIN — Medication 10 ML: at 06:17

## 2017-01-01 RX ADMIN — PIPERACILLIN AND TAZOBACTAM 4.5 G: 4; .5 INJECTION, POWDER, LYOPHILIZED, FOR SOLUTION INTRAVENOUS; PARENTERAL at 00:09

## 2017-01-01 RX ADMIN — HALOPERIDOL LACTATE 4 MG: 5 INJECTION, SOLUTION INTRAMUSCULAR at 14:49

## 2017-01-01 RX ADMIN — Medication 10 ML: at 14:03

## 2017-01-01 RX ADMIN — GLYCOPYRROLATE 0.2 MG: 0.2 INJECTION, SOLUTION INTRAMUSCULAR; INTRAVENOUS at 11:24

## 2017-01-01 RX ADMIN — IPRATROPIUM BROMIDE AND ALBUTEROL SULFATE 3 ML: .5; 3 SOLUTION RESPIRATORY (INHALATION) at 15:53

## 2017-01-01 RX ADMIN — Medication 10 ML: at 23:34

## 2017-01-01 RX ADMIN — MUPIROCIN: 20 OINTMENT TOPICAL at 08:41

## 2017-01-01 RX ADMIN — MUPIROCIN: 20 OINTMENT TOPICAL at 18:35

## 2017-01-01 RX ADMIN — Medication 10 ML: at 06:07

## 2017-01-01 RX ADMIN — GLYCOPYRROLATE 0.2 MG: 0.2 INJECTION, SOLUTION INTRAMUSCULAR; INTRAVENOUS at 15:58

## 2017-01-01 RX ADMIN — HALOPERIDOL LACTATE 4 MG: 5 INJECTION, SOLUTION INTRAMUSCULAR at 15:59

## 2017-01-01 RX ADMIN — METOPROLOL TARTRATE 12.5 MG: 25 TABLET ORAL at 10:12

## 2017-01-01 RX ADMIN — PIPERACILLIN AND TAZOBACTAM 4.5 G: 4; .5 INJECTION, POWDER, LYOPHILIZED, FOR SOLUTION INTRAVENOUS; PARENTERAL at 17:22

## 2017-01-01 RX ADMIN — PIPERACILLIN AND TAZOBACTAM 4.5 G: 4; .5 INJECTION, POWDER, LYOPHILIZED, FOR SOLUTION INTRAVENOUS; PARENTERAL at 08:46

## 2017-01-01 RX ADMIN — MORPHINE SULFATE 4 MG: 4 INJECTION, SOLUTION INTRAMUSCULAR; INTRAVENOUS at 11:22

## 2017-01-01 RX ADMIN — AMIODARONE HYDROCHLORIDE 0.5 MG/MIN: 50 INJECTION, SOLUTION INTRAVENOUS at 16:59

## 2017-01-01 RX ADMIN — Medication 10 ML: at 13:28

## 2017-01-01 RX ADMIN — IPRATROPIUM BROMIDE AND ALBUTEROL SULFATE 3 ML: .5; 3 SOLUTION RESPIRATORY (INHALATION) at 08:35

## 2017-01-01 RX ADMIN — FUROSEMIDE 20 MG: 10 INJECTION, SOLUTION INTRAMUSCULAR; INTRAVENOUS at 17:28

## 2017-01-01 RX ADMIN — FUROSEMIDE 20 MG: 10 INJECTION, SOLUTION INTRAMUSCULAR; INTRAVENOUS at 11:06

## 2017-01-01 RX ADMIN — FUROSEMIDE 80 MG: 10 INJECTION, SOLUTION INTRAMUSCULAR; INTRAVENOUS at 08:42

## 2017-01-01 RX ADMIN — MORPHINE SULFATE 4 MG: 4 INJECTION, SOLUTION INTRAMUSCULAR; INTRAVENOUS at 14:02

## 2017-01-01 RX ADMIN — SODIUM CHLORIDE 25 ML/HR: 450 INJECTION, SOLUTION INTRAVENOUS at 09:26

## 2017-01-01 RX ADMIN — AMIODARONE HYDROCHLORIDE 1 MG/MIN: 50 INJECTION, SOLUTION INTRAVENOUS at 10:18

## 2017-01-01 RX ADMIN — FUROSEMIDE 20 MG: 10 INJECTION, SOLUTION INTRAMUSCULAR; INTRAVENOUS at 05:50

## 2017-01-01 RX ADMIN — FUROSEMIDE 40 MG: 10 INJECTION, SOLUTION INTRAMUSCULAR; INTRAVENOUS at 04:50

## 2017-01-01 RX ADMIN — SODIUM CHLORIDE 150 ML/HR: 900 INJECTION, SOLUTION INTRAVENOUS at 21:40

## 2017-01-01 RX ADMIN — IPRATROPIUM BROMIDE AND ALBUTEROL SULFATE 3 ML: .5; 3 SOLUTION RESPIRATORY (INHALATION) at 19:26

## 2017-01-01 RX ADMIN — Medication 10 ML: at 21:40

## 2017-01-01 RX ADMIN — ALTEPLASE 50 MG: KIT at 21:09

## 2017-01-01 RX ADMIN — HEPARIN SODIUM 5000 UNITS: 5000 INJECTION, SOLUTION INTRAVENOUS; SUBCUTANEOUS at 17:15

## 2017-01-01 RX ADMIN — MORPHINE SULFATE 4 MG: 4 INJECTION, SOLUTION INTRAMUSCULAR; INTRAVENOUS at 15:58

## 2017-01-01 RX ADMIN — MUPIROCIN: 20 OINTMENT TOPICAL at 08:35

## 2017-01-01 RX ADMIN — METOPROLOL TARTRATE 12.5 MG: 25 TABLET ORAL at 11:58

## 2017-01-01 RX ADMIN — IPRATROPIUM BROMIDE AND ALBUTEROL SULFATE 3 ML: .5; 3 SOLUTION RESPIRATORY (INHALATION) at 19:53

## 2017-01-01 RX ADMIN — Medication 10 ML: at 15:57

## 2017-01-01 RX ADMIN — MORPHINE SULFATE 4 MG: 4 INJECTION, SOLUTION INTRAMUSCULAR; INTRAVENOUS at 21:17

## 2017-01-01 RX ADMIN — PIPERACILLIN AND TAZOBACTAM 4.5 G: 4; .5 INJECTION, POWDER, LYOPHILIZED, FOR SOLUTION INTRAVENOUS; PARENTERAL at 08:53

## 2017-01-01 RX ADMIN — Medication 10 ML: at 06:45

## 2017-01-01 RX ADMIN — IPRATROPIUM BROMIDE AND ALBUTEROL SULFATE 3 ML: .5; 3 SOLUTION RESPIRATORY (INHALATION) at 07:38

## 2017-01-01 RX ADMIN — HEPARIN SODIUM 5000 UNITS: 5000 INJECTION, SOLUTION INTRAVENOUS; SUBCUTANEOUS at 10:15

## 2017-01-01 RX ADMIN — IPRATROPIUM BROMIDE AND ALBUTEROL SULFATE 3 ML: .5; 3 SOLUTION RESPIRATORY (INHALATION) at 04:17

## 2017-01-01 RX ADMIN — METOPROLOL TARTRATE 12.5 MG: 25 TABLET ORAL at 17:28

## 2017-01-01 RX ADMIN — LORAZEPAM 0.5 MG: 2 INJECTION INTRAMUSCULAR; INTRAVENOUS at 14:03

## 2017-01-01 RX ADMIN — SODIUM CHLORIDE 75 ML/HR: 900 INJECTION, SOLUTION INTRAVENOUS at 13:27

## 2017-01-01 RX ADMIN — HEPARIN SODIUM 5000 UNITS: 5000 INJECTION, SOLUTION INTRAVENOUS; SUBCUTANEOUS at 02:25

## 2017-01-01 RX ADMIN — HEPARIN SODIUM 5000 UNITS: 5000 INJECTION, SOLUTION INTRAVENOUS; SUBCUTANEOUS at 11:58

## 2017-01-01 RX ADMIN — MUPIROCIN: 20 OINTMENT TOPICAL at 08:57

## 2017-01-01 RX ADMIN — IPRATROPIUM BROMIDE AND ALBUTEROL SULFATE 3 ML: .5; 3 SOLUTION RESPIRATORY (INHALATION) at 12:50

## 2017-01-01 RX ADMIN — GLYCOPYRROLATE 0.2 MG: 0.2 INJECTION, SOLUTION INTRAMUSCULAR; INTRAVENOUS at 06:37

## 2017-01-01 RX ADMIN — HEPARIN SODIUM 5000 UNITS: 5000 INJECTION, SOLUTION INTRAVENOUS; SUBCUTANEOUS at 01:54

## 2017-01-01 RX ADMIN — ATORVASTATIN CALCIUM 20 MG: 20 TABLET, FILM COATED ORAL at 09:46

## 2017-01-01 RX ADMIN — HEPARIN SODIUM 5000 UNITS: 5000 INJECTION, SOLUTION INTRAVENOUS; SUBCUTANEOUS at 11:00

## 2017-01-01 RX ADMIN — ATORVASTATIN CALCIUM 20 MG: 20 TABLET, FILM COATED ORAL at 11:58

## 2017-01-01 RX ADMIN — METOPROLOL TARTRATE 12.5 MG: 25 TABLET ORAL at 11:00

## 2017-01-01 RX ADMIN — HEPARIN SODIUM 5000 UNITS: 5000 INJECTION, SOLUTION INTRAVENOUS; SUBCUTANEOUS at 03:00

## 2017-01-01 RX ADMIN — HEPARIN SODIUM 5000 UNITS: 5000 INJECTION, SOLUTION INTRAVENOUS; SUBCUTANEOUS at 17:49

## 2017-01-01 RX ADMIN — ATORVASTATIN CALCIUM 20 MG: 20 TABLET, FILM COATED ORAL at 10:15

## 2017-01-01 RX ADMIN — Medication 10 ML: at 21:47

## 2017-01-01 RX ADMIN — IPRATROPIUM BROMIDE AND ALBUTEROL SULFATE 3 ML: .5; 3 SOLUTION RESPIRATORY (INHALATION) at 11:30

## 2017-01-01 RX ADMIN — IPRATROPIUM BROMIDE AND ALBUTEROL SULFATE 3 ML: .5; 3 SOLUTION RESPIRATORY (INHALATION) at 08:45

## 2017-01-01 RX ADMIN — INSULIN LISPRO 2 UNITS: 100 INJECTION, SOLUTION INTRAVENOUS; SUBCUTANEOUS at 17:15

## 2017-03-02 PROBLEM — N18.9 CKD (CHRONIC KIDNEY DISEASE): Status: ACTIVE | Noted: 2017-01-01

## 2017-03-02 NOTE — PROGRESS NOTES
1. Have you been to the ER, urgent care clinic since your last visit? Hospitalized since your last visit?no    2. Have you seen or consulted any other health care providers outside of the [address] [place] [address] since your last visit? Include any pap smears or colon screening.  no

## 2017-03-02 NOTE — PROGRESS NOTES
Subjective: Mr. Corbin Sosa. is a patient of mine who presents today for follow up. Chief Complaint   Patient presents with    Diabetes     pt is here fo a 4 month f/u    Other     pt c/o itching between shoulder blades possible mole per wife x 2 weeks       BPPV: Doing better lately. He has seen ENT, Dr. Raji Sheldon, as we have noted in the past.   He has had recurrent episodes of transient dizziness. He feels fine now. It is recalled from 2011: Saw Dr. Amy Mohamud, and was sent to exercise. Infrequent. Occasionally a little dizzy when he stands. No recent episodes of hypoglycemia. It is recalled that we saw him June 2014 for hypoglycemia: He has had several ER visits for low blood sugar. It sounds as though his diet is variable, and he doesn't always eat adequately. We stopped his glimepiride, and continued him on just metformin. This was stopped in Nv 2016 due to high Cr. Glc running low to mid 100s. 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 He is seeing Dr. Nikky Segal and Dr. Pati Page.   He denies HA, focal weakness, chest pain, or other cardiac symptoms. Has ringing in the ears from time to time, \"they say that came from [place].\"  For all issues addressed today, see A and P below. PMH: He has a past medical history of Hyperlipidemia (10/28/2009); Neuropathy (10/28/2009); ED (erectile dysfunction) (10/28/2009); SDH (subdural hematoma); Carpal tunnel syndrome (2006); Vertigo (2007); DM type 2 (diabetes mellitus, type 2) (10/28/2009); and HTN (hypertension) (10/28/2009). I am reminded that he had a normal CT scan in 2009. Dizziness/vertigo 2011 (seeing Dr. Kelly Griffiths, MRI showed only microvascular disease; Recurred and was referred to Dr. Amisha Barahona). Normal stress test 2013. PSH: Evacuation SDH; At [facility] in [facility]. Pacemaker 2013. ALL: is allergic to lisinopril.    MED:   Current Outpatient Prescriptions on File Prior to Visit   Medication Sig Dispense Refill    amiodarone (CORDARONE) 200 mg tablet TAKE 1 TABLET BY MOUTH DAILY 30 Tab 5    valsartan (DIOVAN) 80 mg tablet Take 1 Tab by mouth daily. 90 Tab 3    amLODIPine (NORVASC) 10 mg tablet TAKE 1 TABLET BY MOUTH EVERY DAY 90 Tab 3    atorvastatin (LIPITOR) 20 mg tablet Take 1 Tab by mouth daily. 90 Tab 3    chlorthalidone (HYGROTEN) 50 mg tablet Take 1 Tab by mouth daily. 90 Tab 3    metoprolol (LOPRESSOR) 25 mg tablet Take 1 Tab by mouth two (2) times a day. 180 Tab 3    cholecalciferol, vitamin D3, (VITAMIN D3) 4,000 unit cap Take 4,000 Units by mouth daily. 90 Tab 3    Blood-Glucose Meter (FREESTYLE INSULINX) misc Test blood sugar once a week 1 Each 11    Lancets (FREESTYLE LANCETS) misc Check blood sugar daily and record 1 Package 11    glucose blood VI test strips (FREESTYLE INSULINX) strip Test blood sugar daily and record 1 Package 11     No current facility-administered medications on file prior to visit. FH: His family history includes Stroke in his mother. SH: . 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 . He reports that he has never smoked. He has never used smokeless tobacco.  He reports that he drinks alcohol. He reports that he does not currently use illicit drugs. ROS: Complete review of systems was performed and is otherwise unremarkable except as noted elsewhere. Objective: Vitals:   Visit Vitals    /65 (BP 1 Location: Left arm, BP Patient Position: Sitting)    Pulse 70    Temp 97.8 °F (36.6 °C) (Oral)    Resp 18    Ht 5' 5\" (1.651 m)    Wt 133 lb (60.3 kg)    SpO2 98%    BMI 22.13 kg/m2      General: WM in NAD. HEENT: PERRLA. EOMI. OP moist, pink. Neck: Supple. No LAD. Lungs: CTAB. Heart: RRR. No M/G/R. Abd: S, NT, ND. Ext: Warm. No C/C/E. Neuro: CN 2-12 grossly intact; no focal motor deficits were detected. Gait was broad based and he weaved a bit; Unable to do heel to toe walking. No tremors. Assessment / Plan:    1. Rash: Itchy annular scaly patch about 2 cm--Might be tinea corporis. Rx for lotrisone. 2. HTN (hypertension): Fine today. Continue current meds. 3. Hyperlipidemia: Controlled. - METABOLIC PANEL, COMPREHENSIVE  - LIPID PANEL  4. DM type 2 (diabetes mellitus, type 2): Watch diet / exercise. Continue current meds. Last visit we cut back the metformin to once a day--stop it if Cr rises any further.   - METABOLIC PANEL, COMPREHENSIVE  - HEMOGLOBIN A1C  5. Neuropathy: Stable. 6. CKD: Cr has been slowly rising. Keep an eye on this. Awaiting nephrology visit. Now off metformin  7. ED (erectile dysfunction): May continue viagra. 8. Nocturia 1-2 x: BPH? Mild. Stable. 9. Bradycardia: Now s/p pacemaker. Follows with Cardiology  10. Vertigo: Recurrent issues with this now and then. Otherwise improved. 11. Vitamin d deficiency  - VITAMIN D, 25 HYDROXY    Follow-up Disposition:  Return in about 4 months (around 7/2/2017) for DM.

## 2017-03-02 NOTE — MR AVS SNAPSHOT
Visit Information Date & Time Provider Department Dept. Phone Encounter #  
 3/2/2017 10:30 AM Elnita Councilman, [address] [place] [address],[address] [phone] [id number] Follow-up Instructions Return in about 4 months (around 7/2/2017) for DM. Follow-up and Disposition History Your Appointments 8/10/2017  1:45 PM  
PACEMAKER with PACEMAKER, [facility] Appt Note: 6MO BSC DCPM  
 [phone] [facility]  
[phone] [facility]  
  
    
 8/10/2017  2:00 PM  
ANNUAL with [facility] Appt Note: annual with device check [id number] [facility]  
[phone] [id number] [facility] Upcoming Health Maintenance Date Due Pneumococcal 65+ High/Highest Risk (2 of 2 - PPSV23) 10/15/2015 MEDICARE YEARLY EXAM 6/9/2016 EYE EXAM RETINAL OR DILATED Q1 11/18/2016 FOOT EXAM Q1 2/9/2017 HEMOGLOBIN A1C Q6M 5/3/2017 MICROALBUMIN Q1 11/3/2017 LIPID PANEL Q1 11/3/2017 GLAUCOMA SCREENING Q2Y 11/18/2017 DTaP/Tdap/Td series (2 - Td) 11/8/2026 Allergies as of 3/2/2017  Review Complete On: 3/2/2017 By: Elnita Councilman, MD  
  
 Severity Noted Reaction Type Reactions Lisinopril High 06/22/2009   Side Effect Swelling  
 angioedema Current Immunizations  Reviewed on 10/9/2015 Name Date Influenza Vaccine 10/1/2015, 10/3/2014, 10/3/2014, 9/6/2013 Influenza Vaccine (Quad) PF 11/3/2016 Pneumococcal Vaccine (Unspecified Type) 10/15/2010 TD Vaccine 5/6/1977 Tdap 11/8/2016 10:51 AM, 6/9/2015 Zoster Vaccine, Live 10/1/2015 Not reviewed this visit You Were Diagnosed With   
  
 Codes Comments Tinea corporis    -  Primary ICD-10-CM: B35.4 ICD-9-CM: 110.5 Type 2 diabetes mellitus without complication, unspecified long term insulin use status (HCC)     ICD-10-CM: E11.9 ICD-9-CM: 250.00 Essential hypertension     ICD-10-CM: I10 
ICD-9-CM: 401.9 Mixed hyperlipidemia     ICD-10-CM: E78.2 ICD-9-CM: 272.2 Vitamin D deficiency     ICD-10-CM: E55.9 ICD-9-CM: 268.9 CKD (chronic kidney disease), stage 3 (moderate)     ICD-10-CM: N18.3 ICD-9-CM: 198. 3 Vitals BP  
  
  
  
  
  
 105/65 (BP 1 Location: Left arm, BP Patient Position: Sitting) BMI and BSA Data Body Mass Index Body Surface Area  
 22.13 kg/m 2 1.66 m 2 Preferred Pharmacy Pharmacy Name Phone [phone] , [address] AT [place] [address] [phone] Your Updated Medication List  
  
   
This list is accurate as of: 3/2/17 11:28 AM.  Always use your most recent med list.  
  
  
  
  
 amiodarone 200 mg tablet Commonly known as:  CORDARONE  
TAKE 1 TABLET BY MOUTH DAILY  
  
 amLODIPine 10 mg tablet Commonly known as:  Ksenia Pace TAKE 1 TABLET BY MOUTH EVERY DAY  
  
 atorvastatin 20 mg tablet Commonly known as:  LIPITOR Take 1 Tab by mouth daily. Blood-Glucose Meter Misc Commonly known as:  FREESTYLE INSULINX Test blood sugar once a week  
  
 chlorthalidone 50 mg tablet Commonly known as:  Cele Juniper Take 1 Tab by mouth daily. cholecalciferol (vitamin D3) 4,000 unit Cap Commonly known as:  VITAMIN D3 Take 4,000 Units by mouth daily. clotrimazole-betamethasone topical cream  
Commonly known as:  Terence Leaf Apply  to affected area two (2) times a day. glucose blood VI test strips strip Commonly known as:  FREESTYLE INSULINX Test blood sugar daily and record Lancets Misc Commonly known as:  FREESTYLE LANCETS Check blood sugar daily and record  
  
 metoprolol tartrate 25 mg tablet Commonly known as:  LOPRESSOR  
 Take 1 Tab by mouth two (2) times a day. valsartan 80 mg tablet Commonly known as:  DIOVAN Take 1 Tab by mouth daily. Prescriptions Sent to Pharmacy Refills  
 cholecalciferol, vitamin D3, (VITAMIN D3) 4,000 unit cap 3 Sig: Take 4,000 Units by mouth daily. Class: Normal  
 Pharmacy: [facility] [address] Ph #: [phone] Route: Oral  
 clotrimazole-betamethasone (LOTRISONE) topical cream 0 Sig: Apply  to affected area two (2) times a day. Class: Normal  
 Pharmacy: [address] Ph #: [phone] Route: Topical  
  
We Performed the Following CBC WITH AUTOMATED DIFF [25514 CPT(R)] HEMOGLOBIN A1C WITH EAG [92993 CPT(R)]  DIABETES FOOT EXAM [HM7 Custom] LIPID PANEL [64889 CPT(R)] METABOLIC PANEL, COMPREHENSIVE [05705 CPT(R)] VITAMIN D, 25 HYDROXY [id number] CPT(R)] Follow-up Instructions Return in about 4 months (around 7/2/2017) for DM. Introducing [facility]! Gale Bell introduces [employer] patient portal. Now you can access parts of your medical record, email your doctor's office, and request medication refills online. 1. In your internet browser, go to [email or web address] 2. Click on the First Time User? Click Here link in the Sign In box. You will see the New Member Sign Up page. 3. Enter your [employer] Access Code exactly as it appears below. You will not need to use this code after youve completed the sign-up process. If you do not sign up before the expiration date, you must request a new code. · [employer] Access Code: [email or web address] Expires: 5/17/2017  9:07 AM 
 
4. Enter the last four digits of your Social Security Number (xxxx) and Date of Birth (mm/dd/yyyy) as indicated and click Submit.  You will be taken to the next sign-up page. 5. Create a mymission2 ID. This will be your mymission2 login ID and cannot be changed, so think of one that is secure and easy to remember. 6. Create a mymission2 password. You can change your password at any time. 7. Enter your Password Reset Question and Answer. This can be used at a later time if you forget your password. 8. Enter your e-mail address. You will receive e-mail notification when new information is available in [address]. 9. Click Sign Up. You can now view and download portions of your medical record. 10. Click the Download Summary menu link to download a portable copy of your medical information. If you have questions, please visit the Frequently Asked Questions section of the mymission2 website. Remember, mymission2 is NOT to be used for urgent needs. For medical emergencies, dial 911. Now available from your iPhone and Android! Please provide this summary of care documentation to your next provider. Your primary care clinician is listed as [place]. If you have any questions after today's visit, please call [phone].

## 2017-04-09 PROBLEM — E11.22 TYPE 2 DM WITH CKD STAGE 3 AND HYPERTENSION (HCC): Status: ACTIVE | Noted: 2017-01-01

## 2017-04-09 PROBLEM — I50.21 SYSTOLIC CHF, ACUTE (HCC): Status: ACTIVE | Noted: 2017-01-01

## 2017-04-09 PROBLEM — N18.30 TYPE 2 DM WITH CKD STAGE 3 AND HYPERTENSION (HCC): Status: ACTIVE | Noted: 2017-01-01

## 2017-04-09 PROBLEM — I50.23 ACUTE ON CHRONIC SYSTOLIC (CONGESTIVE) HEART FAILURE (HCC): Status: ACTIVE | Noted: 2017-01-01

## 2017-04-09 PROBLEM — R60.9 EDEMA: Status: ACTIVE | Noted: 2017-01-01

## 2017-04-09 PROBLEM — I12.9 TYPE 2 DM WITH CKD STAGE 3 AND HYPERTENSION (HCC): Status: ACTIVE | Noted: 2017-01-01

## 2017-04-09 NOTE — PROGRESS NOTES
Bedside and Verbal shift change report given to Danielle Claros RN by Mychal Parker RN. Report included the following information SBAR, Kardex, Intake/Output, MAR, Recent Results and Cardiac Rhythm NSR. Primary Nurse Krystle Fuller and Gina Bright RN performed a dual skin assessment on this patient No impairment noted  Trever score is 19      Bedside and Verbal shift change report given to Linus Pandya RN by Danielle Claros RN. Report included the following information SBAR, Kardex, Intake/Output, MAR, Recent Results and Cardiac Rhythm Paced.

## 2017-04-09 NOTE — ED NOTES
Assisted pt to reposition in bed for feeling SOB. Sats 88% with movement recovering to 90% with rest in upright position. 2LNC applied.

## 2017-04-09 NOTE — ROUTINE PROCESS
TRANSFER - OUT REPORT:    Verbal report given to Sebastian Mitchell.  being transferred to [facility]) for routine progression of care       Report consisted of patients Situation, Background, Assessment and   Recommendations(SBAR). Information from the following report(s) SBAR, ED Summary, MAR, Recent Results and Cardiac Rhythm Paced was reviewed with the receiving nurse. Lines:       Opportunity for questions and clarification was provided.       Patient transported with:   Monitor  O2 @ 2 liters  Registered Nurse

## 2017-04-09 NOTE — CONSULTS
Consultation Note    NAME: Marcus Hoffman. :  1925   MRN:  [id number]     Date/Time:  2017 12:42 PM    I have been asked to see this patient by Dr. Sharla Tamez  for advice/opinion re: ckd stage 3. Assessment :    Plan:  CKD stage 3 ([place])  Diastolic HF  Pulmonary edema  DM  HTN Creatinine is at recent baseline (~ 2); bland urine; good response to iv lasix; chlorthalidone was decreased in mid-March by Dr. Kristen Garcia b/c low BP    Continue as is, will follow       Subjective:   CHIEF COMPLAINT:  ckd    HISTORY OF PRESENT ILLNESS:     Faisal Mchugh is a 80 y.o.   male who has a history of ckd stage 3. Last seen by Dr. Kristen Garcia on 3/16. Creatinine stable at that appointment (1.9). His chlorthalidone was decreased at that apt b/c low BP (100/58). Mr. Argenis Wagner presented with SOB and maile. He is feeling better already. Good uop. Denies nsaids. Denies salt indiscretion. Chronic leg swelling (maybe a bit worse). SOB is better. Denies OGDEN symptoms. Past Medical History:   Diagnosis Date    Arrhythmia     Arthritis     Carpal tunnel syndrome 2006    CKD (chronic kidney disease) 3/2/2017    DM type 2 (diabetes mellitus, type 2) ([address] 10/28/2009    ED (erectile dysfunction) 10/28/2009    HTN (hypertension) 10/28/2009    Hyperlipidemia 10/28/2009    Neuropathy 10/28/2009    Other ill-defined conditions     hyperlipidemia    Pacemaker     SDH (subdural hematoma) ([address]     Systolic CHF ([address]     Vertigo       Past Surgical History:   Procedure Laterality Date    HX PACEMAKER      NEUROLOGICAL PROCEDURE UNLISTED      evacuation SDH; At [facility].        Social History   Substance Use Topics    Smoking status: Never Smoker    Smokeless tobacco: Never Used    Alcohol use No      Family History   Problem Relation Age of Onset    Stroke Mother     Pneumonia Father     MS Child     Cancer Child      breast    Hearing Impairment Child       Allergies   Allergen Reactions    Lisinopril Swelling     angioedema      Prior to Admission medications    Medication Sig Start Date End Date Taking? Authorizing Provider   chlorthalidone (HYGROTEN) 50 mg tablet TAKE 1 TABLET BY MOUTH EVERY DAY 3/3/17  Yes Zafar Alston MD   cholecalciferol, vitamin D3, (VITAMIN D3) 4,000 unit cap Take 4,000 Units by mouth daily. 3/2/17  Yes Zafar Alston MD   clotrimazole-betamethasone (LOTRISONE) topical cream Apply  to affected area two (2) times a day. 3/2/17  Yes Zafar Alston MD   amiodarone (CORDARONE) 200 mg tablet TAKE 1 TABLET BY MOUTH DAILY 12/20/16  Yes Kori Fang NP   valsartan (DIOVAN) 80 mg tablet Take 1 Tab by mouth daily. 7/27/16  Yes Zafar Alston MD   amLODIPine (NORVASC) 10 mg tablet TAKE 1 TABLET BY MOUTH EVERY DAY 6/13/16  Yes Zafar Alston MD   atorvastatin (LIPITOR) 20 mg tablet Take 1 Tab by mouth daily. 5/25/16  Yes Zafar Alston MD   metoprolol (LOPRESSOR) 25 mg tablet Take 1 Tab by mouth two (2) times a day.  5/2/16  Yes Zafar Alston MD   Blood-Glucose Meter (FREESTYLE INSULINX) misc Test blood sugar once a week 6/11/14  Yes Zafar Alston MD   Lancets (FREESTYLE LANCETS) misc Check blood sugar daily and record 6/11/14  Yes Zafar Alston MD   glucose blood VI test strips (FREESTYLE INSULINX) strip Test blood sugar daily and record 6/11/14  Yes Zafar Alston MD     REVIEW OF SYSTEMS:     []  Unable to obtain reliable ROS due to  [] mental status  [] sedated   [] intubated   [x] Total of 12 systems reviewed as follows:  Constitutional: negative fever, negative chills, negative weight loss  Eyes:   negative visual changes  ENT:   negative sore throat, tongue or lip swelling  Respiratory:  negative cough, + dyspnea  Cards:  negative for chest pain, palpitations, + lower extremity edema  GI:   negative for nausea, vomiting, diarrhea, and abdominal pain  :  negative for frequency, dysuria  Integument:  negative for rash and pruritus  Heme:  negative for easy bruising and gum/nose bleeding  Musculoskel: negative for myalgias,  back pain and muscle weakness  Neuro:  negative for headaches, dizziness, vertigo  Psych:  negative for feelings of anxiety, depression   Travel?: none    Objective:   VITALS:    Visit Vitals    /55 (BP 1 Location: Left arm, BP Patient Position: At rest)    Pulse 70    Temp 97.9 °F (36.6 °C)    Resp 20    Ht 5' 6\" (1.676 m)    Wt 63.8 kg (140 lb 11.2 oz)    SpO2 97%    BMI 22.71 kg/m2     PHYSICAL EXAM:  Gen:  [x]  WD [x]  WN  [] cachectic []  thin []  obese []  disheveled             []  ill apearing  []   Critical  []   Chronic    [x]  No acute distress    HEENT:   [x] NC/AT/PERRLA/EOMI    [] pink conjunctivae      [] pale conjunctivae                  PERRL  [] yes  [] no      [] moist mucosa    [] dry mucosa    hearing intact to voice [x] yes  [] No                 NECK:   supple [x] yes  [] no        masses [] yes  [] No               thyroid  []  non tender  []  tender    RESP:   [x] CTA bilaterally/no wheezing/rhonchi/rales/crackles    [] rhonchi bilaterally - no dullness  [] wheezing   [] rhonchi   [] crackles     use of accessory muscles [] yes [] no    CARD:   [x]  regular rate and rhythm/No murmurs/rubs/gallops    murmur  [] yes ()  [] no      Rubs  [] yes  [] no       Gallops [] yes  [] no    Rate []  regular  []  irregular        carotid bruits  [] Right  []  Left                 LE edema [x] yes  [] no           JVP  []  yes   []  no    ABD:    [x] soft/non distended/non tender/+bowel sounds/no HSM    []  Rigid    tenderness [] yes [] no   Liver enlargement  []  yes []  no                Spleen enlargement  []  yes []  no     distended []  yes [] no     bowel sound  [] hypoactive   [] hyperactive    LYMPH:    Neck []  yes [x]  no       Axillae []  yes []  no    SKIN:   Rashes []  yes   [x]  no    Ulcers []  yes   []  no               [] tight to palpitation    skin turgor []  good  [] poor  [] decreased Cyanosis/clubbing []  yes []  no    NEUR:   [x] cranial nerves II-XII grossly intact       [] Cranial nerves deficit                 []  facial droop    []  slurred speech   [] aphasic     [] Strength normal     []  weakness  []  LUE  []   RUE/ []  LLE  []   RLE    follows commands  [x]  yes []  no           PSYCH:   insight [] poor [x] good   Alert and Oriented to  [x] person  [x] place  [x]  time                    [] depressed [] anxious [] agitated  [] lethargic [] stuporous  [] sedated     LAB DATA REVIEWED:    Recent Labs      04/09/17 0449   WBC  7.8   HGB  13.1   HCT  39.5   PLT  173     Recent Labs      04/09/17 0449   NA  141   K  3.8   CL  107   CO2  23   BUN  27*   CREA  1.91*   GLU  84   CA  8.8   MG  2.3     Recent Labs      04/09/17 0449   SGOT  22   ALT  34   AP  109   TBILI  0.8   ALB  3.5   GLOB  3.8     No results for input(s): INR, PTP, APTT in the last 72 hours. No lab exists for component: INREXT   No results for input(s): FE, TIBC, PSAT, FERR in the last 72 hours. No results for input(s): PH, PCO2, PO2 in the last 72 hours. Recent Labs      04/09/17 0449   CPK  114   CKMB  2.5     Lab Results   Component Value Date/Time    Glucose (POC) 92 04/09/2017 11:52 AM    Glucose (POC) 111 04/09/2017 08:20 AM    Glucose (POC) 85 12/30/2016 03:10 AM    Glucose (POC) 100 06/02/2014 07:07 PM    Glucose (POC) 81 06/02/2014 06:26 PM    Glucose  06/03/2014 10:37 AM       Procedures: see electronic medical records for all procedures/Xrays and details which were not copied into this note but were reviewed prior to creation of Plan.    ________________________________________________________________________       ___________________________________________________  Consulting Physician:  Denia Dillard MD

## 2017-04-09 NOTE — PROGRESS NOTES
General Daily Progress Note    Admit Date: 4/9/2017  Hospital day 1    Subjective:     Patient has no complaint of chest tightness. ..   Medication side effects: none    Current Facility-Administered Medications   Medication Dose Route Frequency    amiodarone (CORDARONE) tablet 200 mg  200 mg Oral DAILY    atorvastatin (LIPITOR) tablet 20 mg  20 mg Oral DAILY    cholecalciferol (VITAMIN D3) tablet 4,000 Units  4,000 Units Oral DAILY    clotrimazole-betamethasone (LOTRISONE) 1-0.05 % cream   Topical BID    metoprolol tartrate (LOPRESSOR) tablet 12.5 mg  12.5 mg Oral BID    sodium chloride (NS) flush 5-10 mL  5-10 mL IntraVENous Q8H    sodium chloride (NS) flush 5-10 mL  5-10 mL IntraVENous PRN    acetaminophen (TYLENOL) tablet 500 mg  500 mg Oral Q4H PRN    oxyCODONE-acetaminophen (PERCOCET) 5-325 mg per tablet 1 Tab  1 Tab Oral Q4H PRN    morphine injection 1 mg  1 mg IntraVENous Q4H PRN    naloxone (NARCAN) injection 0.4 mg  0.4 mg IntraVENous PRN    ondansetron (ZOFRAN) injection 4 mg  4 mg IntraVENous Q4H PRN    bisacodyl (DULCOLAX) tablet 5 mg  5 mg Oral DAILY PRN    heparin (porcine) injection 5,000 Units  5,000 Units SubCUTAneous Q8H    furosemide (LASIX) injection 20 mg  20 mg IntraVENous BID    insulin lispro (HUMALOG) injection   SubCUTAneous AC&HS    glucose chewable tablet 16 g  4 Tab Oral PRN    dextrose (D50W) injection syrg 12.5-25 g  12.5-25 g IntraVENous PRN    glucagon (GLUCAGEN) injection 1 mg  1 mg IntraMUSCular PRN        Review of Systems  Pertinent items are noted in HPI.     Objective:     Patient Vitals for the past 8 hrs:   BP Temp Pulse Resp SpO2 Height Weight   04/09/17 0736 121/69 98 °F (36.7 °C) (!) 104 22 99 % - -   04/09/17 0715 - - - - - - 140 lb 11.2 oz (63.8 kg)   04/09/17 0550 121/55 - 70 - - - -   04/09/17 0438 119/54 97.9 °F (36.6 °C) 71 22 91 % 5' 6\" (1.676 m) 141 lb 1.5 oz (64 kg)     04/09 0701 - 04/09 1900  In: -   Out: 375 [Urine:375]       Physical Exam: Visit Vitals    /69 (BP 1 Location: Left arm, BP Patient Position: Sitting)    Pulse (!) 104    Temp 98 °F (36.7 °C)    Resp 22    Ht 5' 6\" (1.676 m)    Wt 140 lb 11.2 oz (63.8 kg)    SpO2 99%    BMI 22.71 kg/m2   Lungs- basilar rales present  Heart: regular rate and rhythm, S1, S2 normal, no murmur, click, rub or gallop  Abdomen: soft, non-tender. Bowel sounds normal. No masses,  no organomegaly  Extremities: edema , 2+ R leg, 1+ l leg      ECG: sinus tachycardia     Data Review   Recent Results (from the past 24 hour(s))   CBC WITH AUTOMATED DIFF    Collection Time: 04/09/17  4:49 AM   Result Value Ref Range    WBC 7.8 4.1 - 11.1 K/uL    RBC 4.81 4.10 - 5.70 M/uL    HGB 13.1 12.1 - 17.0 g/dL    HCT 39.5 36.6 - 50.3 %    MCV 82.1 80.0 - 99.0 FL    MCH 27.2 26.0 - 34.0 PG    MCHC 33.2 30.0 - 36.5 g/dL    RDW 15.2 (H) 11.5 - 14.5 %    PLATELET 725 889 - 401 K/uL    NEUTROPHILS 63 32 - 75 %    LYMPHOCYTES 18 12 - 49 %    MONOCYTES 18 (H) 5 - 13 %    EOSINOPHILS 1 0 - 7 %    BASOPHILS 0 0 - 1 %    ABS. NEUTROPHILS 4.8 1.8 - 8.0 K/UL    ABS. LYMPHOCYTES 1.4 0.8 - 3.5 K/UL    ABS. MONOCYTES 1.4 (H) 0.0 - 1.0 K/UL    ABS. EOSINOPHILS 0.1 0.0 - 0.4 K/UL    ABS.  BASOPHILS 0.0 0.0 - 0.1 K/UL   CK W/ CKMB & INDEX    Collection Time: 04/09/17  4:49 AM   Result Value Ref Range     39 - 308 U/L    CK - MB 2.5 <3.6 NG/ML    CK-MB Index 2.2 0 - 2.5     METABOLIC PANEL, COMPREHENSIVE    Collection Time: 04/09/17  4:49 AM   Result Value Ref Range    Sodium 141 136 - 145 mmol/L    Potassium 3.8 3.5 - 5.1 mmol/L    Chloride 107 97 - 108 mmol/L    CO2 23 21 - 32 mmol/L    Anion gap 11 5 - 15 mmol/L    Glucose 84 65 - 100 mg/dL    BUN 27 (H) 6 - 20 MG/DL    Creatinine 1.91 (H) 0.70 - 1.30 MG/DL    BUN/Creatinine ratio 14 12 - 20      GFR est AA 40 (L) >60 ml/min/1.73m2    GFR est non-AA 33 (L) >60 ml/min/1.73m2    Calcium 8.8 8.5 - 10.1 MG/DL    Bilirubin, total 0.8 0.2 - 1.0 MG/DL    ALT (SGPT) 34 12 - 78 U/L AST (SGOT) 22 15 - 37 U/L    Alk.  phosphatase 109 45 - 117 U/L    Protein, total 7.3 6.4 - 8.2 g/dL    Albumin 3.5 3.5 - 5.0 g/dL    Globulin 3.8 2.0 - 4.0 g/dL    A-G Ratio 0.9 (L) 1.1 - 2.2     TROPONIN I    Collection Time: 04/09/17  4:49 AM   Result Value Ref Range    Troponin-I, Qt. 0.05 (H) <0.05 ng/mL   PRO-BNP    Collection Time: 04/09/17  4:49 AM   Result Value Ref Range    NT pro-BNP 4882 (H) 0 - 450 PG/ML   MAGNESIUM    Collection Time: 04/09/17  4:49 AM   Result Value Ref Range    Magnesium 2.3 1.6 - 2.4 mg/dL   EKG, 12 LEAD, INITIAL    Collection Time: 04/09/17  4:52 AM   Result Value Ref Range    Ventricular Rate 70 BPM    Atrial Rate 72 BPM    P-R Interval 224 ms    QRS Duration 90 ms    Q-T Interval 422 ms    QTC Calculation (Bezet) 455 ms    Calculated R Axis 24 degrees    Calculated T Axis -63 degrees    Diagnosis       Electronic atrial pacemaker  Septal infarct , age undetermined  ST & T wave abnormality, consider lateral ischemia  Abnormal ECG  When compared with ECG of 30-DEC-2016 03:20,  Electronic atrial pacemaker has replaced Electronic ventricular pacemaker     GLUCOSE, POC    Collection Time: 04/09/17  8:20 AM   Result Value Ref Range    Glucose (POC) 111 (H) 65 - 100 mg/dL    Performed by Kellie Muhammad (PCT)    URINALYSIS W/ REFLEX CULTURE    Collection Time: 04/09/17  8:44 AM   Result Value Ref Range    Color YELLOW/STRAW      Appearance CLEAR CLEAR      Specific gravity 1.011 1.003 - 1.030      pH (UA) 5.5 5.0 - 8.0      Protein NEGATIVE  NEG mg/dL    Glucose NEGATIVE  NEG mg/dL    Ketone NEGATIVE  NEG mg/dL    Bilirubin NEGATIVE  NEG      Blood NEGATIVE  NEG      Urobilinogen 1.0 0.2 - 1.0 EU/dL    Nitrites NEGATIVE  NEG      Leukocyte Esterase NEGATIVE  NEG      WBC 0-4 0 - 4 /hpf    RBC 0-5 0 - 5 /hpf    Epithelial cells FEW FEW /lpf    Bacteria NEGATIVE  NEG /hpf    UA:UC IF INDICATED CULTURE NOT INDICATED BY UA RESULT CNI      Hyaline cast 0-2 0 - 5 /lpf           Assessment: Principal Problem:    Systolic CHF, acute ([facility] [address] (4/9/2017)    Active Problems:    HTN (hypertension) (10/28/2009)      Hyperlipidemia (10/28/2009)      Pacemaker (8/21/2013)      Overview: Dual chamber-[employer]      Edema (4/9/2017)      Type 2 DM with CKD stage 3 and hypertension ([facility] [address] (4/9/2017)        Plan:     1. Diastolic heart failure- contineu Lasix. Cardiology to see  2. Chronic renal failure  3. Edema legs, right greater than L- for ultrasound to ro DVT  4. Pulmonary hypertension on echo 8-2016  5.  Pacemaker

## 2017-04-09 NOTE — ED PROVIDER NOTES
HPI Comments: Judi Boudreaux is a 80 y.o. male with PMHx significant for DM, HTN, Arrhythmia, CKD, pacemaker placement, who presents ambulatory to ED [facility] ED with cc of persistent SOB x 2 days. Patient reports exacerbation with laying down and exertion. He states he often has to sit down to catch his breath. Patient also reports leg swelling in the past week. Patient denies any recent travel or surgery. He denies a history of DVT or PE. Patient denies any CP, abdominal pain, nausea, vomiting, diarrhea, cough, hemoptysis, or calf pain. He denies use of O2 at home. PCP: Himanshu Thompson MD      Social History: (-) Tobacco      There are no other complaints, changes, or physical findings at this time. Written by Revonda Snellen, ED Scribe, as dictated by Leigh Nieto MD.       The history is provided by the patient. No  was used. Past Medical History:   Diagnosis Date    Arrhythmia     Arthritis     Carpal tunnel syndrome 2006    CKD (chronic kidney disease) 3/2/2017    DM type 2 (diabetes mellitus, type 2) ([address] 10/28/2009    ED (erectile dysfunction) 10/28/2009    HTN (hypertension) 10/28/2009    Hyperlipidemia 10/28/2009    Neuropathy 10/28/2009    Other ill-defined conditions     hyperlipidemia    Pacemaker     SDH (subdural hematoma) ([facility] [address]     Vertigo 2007       Past Surgical History:   Procedure Laterality Date    HX PACEMAKER      NEUROLOGICAL PROCEDURE UNLISTED  1999    evacuation SDH; [facility]. Family History:   Problem Relation Age of Onset    Stroke Mother     Pneumonia Father     MS Child     Cancer Child      breast    Hearing Impairment Child        Social History     Social History    Marital status:      Spouse name: N/A    Number of children: N/A    Years of education: N/A     Occupational History    Not on file.      Social History Main Topics    Smoking status: Never Smoker    Smokeless tobacco: Never Used   [facility] Alcohol use No    Drug use: No    Sexual activity: Yes     Partners: Female     Birth control/ protection: None     Other Topics Concern    Not on file     Social History Narrative    Retired                  ALLERGIES: Lisinopril    Review of Systems   Constitutional: Negative for chills, fatigue and fever. HENT: Negative for congestion, rhinorrhea and sore throat. Eyes: Negative for pain, discharge and visual disturbance. Respiratory: Positive for shortness of breath. Negative for cough, chest tightness and wheezing. Cardiovascular: Positive for leg swelling. Negative for chest pain and palpitations. Gastrointestinal: Negative for abdominal pain, constipation, diarrhea, nausea and vomiting. Genitourinary: Negative for dysuria, frequency and hematuria. Musculoskeletal: Negative for arthralgias, back pain and myalgias. Skin: Negative for rash. Neurological: Negative for dizziness, weakness, light-headedness and headaches. Psychiatric/Behavioral: Negative. All other systems reviewed and are negative. Patient Vitals for the past 12 hrs:   Temp Pulse Resp BP SpO2   04/09/17 0438 97.9 °F (36.6 °C) 71 22 119/54 91 %       Physical Exam   Constitutional: He is oriented to person, place, and time. He appears well-developed and well-nourished. No distress. HENT:   Head: Normocephalic and atraumatic. Eyes: EOM are normal. Right eye exhibits no discharge. Left eye exhibits no discharge. No scleral icterus. Neck: Normal range of motion. Neck supple. No tracheal deviation present. Cardiovascular: Normal rate, regular rhythm, normal heart sounds and intact distal pulses. Exam reveals no gallop and no friction rub. No murmur heard. Pulmonary/Chest: Effort normal. No respiratory distress. He has no wheezes. He has no rales. Crackles in inferior lung bases bilaterally   Abdominal: Soft. He exhibits no distension. There is no tenderness. Musculoskeletal: Normal range of motion. 2+ edema in bilateral lower extremities   Lymphadenopathy:     He has no cervical adenopathy. Neurological: He is alert and oriented to person, place, and time. Skin: Skin is warm and dry. No rash noted. Psychiatric: He has a normal mood and affect. Nursing note and vitals reviewed. MDM  Number of Diagnoses or Management Options  Acute respiratory failure with hypoxia [facility]):   Acute systolic congestive heart failure [facility]):   Diagnosis management comments:     Differential includes heart failure, pulmonary edema, pleural effusion, arrhythmia, ACS, pneumonia, PE  - CBC, CMP  - Sonny, BNP  - CXR  - Plan to admit for heart failure given O2 requirement         Amount and/or Complexity of Data Reviewed  Clinical lab tests: ordered and reviewed  Tests in the radiology section of CPT®: ordered and reviewed  Tests in the medicine section of CPT®: ordered and reviewed  Review and summarize past medical records: yes  Discuss the patient with other providers: yes (hospitalist)  Independent visualization of images, tracings, or specimens: yes    Patient Progress  Patient progress: stable    ED Course       Procedures    EKG interpretation: (Preliminary) 04:52  Rhythm: electronic atrial pacemaker; and regular . Rate (approx.): 70; Axis: normal; AR interval: prolonged; QRS interval: normal ; ST/T wave: T wave abnormality in lateral leads. Written by MIGUELINA Nievesibe, as dictated by Reshma Arenas MD.    CONSULT NOTE:   5:45 AM  Reshma Arenas MD spoke with Dr. Rosario Gant,   Specialty: Hospitalist  Discussed pt's hx, disposition, and available diagnostic and imaging results. Reviewed care plans. Consultant will evaluate pt for admission.   Written by MIGUELINA Nievesibe, as dictated by Reshma Arenas MD.    CRITICAL CARE NOTE :    6:00 AM    IMPENDING DETERIORATION - Respiratory    ASSOCIATED RISK FACTORS - Hypoxia, heart failure    MANAGEMENT- Bedside assessment, supervision of care    INTERPRETATION - EKG, CXR, vital signs    INTERVENTIONS - Oxygen, lasix    CASE REVIEW - Hospitalist, nursing    TREATMENT RESPONSE - Improved    PERFORMED BY - Self    NOTES :  I have spent 60 minutes of critical care time involved in lab review, consultations with specialist, family decision- making, bedside attention and documentation. During this entire length of time I was immediately available to the patient . Ana Baker MD    LABORATORY TESTS:  Recent Results (from the past 12 hour(s))   CBC WITH AUTOMATED DIFF    Collection Time: 04/09/17  4:49 AM   Result Value Ref Range    WBC 7.8 4.1 - 11.1 K/uL    RBC 4.81 4.10 - 5.70 M/uL    HGB 13.1 12.1 - 17.0 g/dL    HCT 39.5 36.6 - 50.3 %    MCV 82.1 80.0 - 99.0 FL    MCH 27.2 26.0 - 34.0 PG    MCHC 33.2 30.0 - 36.5 g/dL    RDW 15.2 (H) 11.5 - 14.5 %    PLATELET 237 046 - 195 K/uL    NEUTROPHILS 63 32 - 75 %    LYMPHOCYTES 18 12 - 49 %    MONOCYTES 18 (H) 5 - 13 %    EOSINOPHILS 1 0 - 7 %    BASOPHILS 0 0 - 1 %    ABS. NEUTROPHILS 4.8 1.8 - 8.0 K/UL    ABS. LYMPHOCYTES 1.4 0.8 - 3.5 K/UL    ABS. MONOCYTES 1.4 (H) 0.0 - 1.0 K/UL    ABS. EOSINOPHILS 0.1 0.0 - 0.4 K/UL    ABS.  BASOPHILS 0.0 0.0 - 0.1 K/UL   CK W/ CKMB & INDEX    Collection Time: 04/09/17  4:49 AM   Result Value Ref Range     39 - 308 U/L    CK - MB 2.5 <3.6 NG/ML    CK-MB Index 2.2 0 - 2.5     METABOLIC PANEL, COMPREHENSIVE    Collection Time: 04/09/17  4:49 AM   Result Value Ref Range    Sodium 141 136 - 145 mmol/L    Potassium 3.8 3.5 - 5.1 mmol/L    Chloride 107 97 - 108 mmol/L    CO2 23 21 - 32 mmol/L    Anion gap 11 5 - 15 mmol/L    Glucose 84 65 - 100 mg/dL    BUN 27 (H) 6 - 20 MG/DL    Creatinine 1.91 (H) 0.70 - 1.30 MG/DL    BUN/Creatinine ratio 14 12 - 20      GFR est AA 40 (L) >60 ml/min/1.73m2    GFR est non-AA 33 (L) >60 ml/min/1.73m2    Calcium 8.8 8.5 - 10.1 MG/DL    Bilirubin, total 0.8 0.2 - 1.0 MG/DL    ALT (SGPT) 34 12 - 78 U/L    AST (SGOT) 22 15 - 37 U/L Alk. phosphatase 109 45 - 117 U/L    Protein, total 7.3 6.4 - 8.2 g/dL    Albumin 3.5 3.5 - 5.0 g/dL    Globulin 3.8 2.0 - 4.0 g/dL    A-G Ratio 0.9 (L) 1.1 - 2.2     TROPONIN I    Collection Time: 04/09/17  4:49 AM   Result Value Ref Range    Troponin-I, Qt. 0.05 (H) <0.05 ng/mL   PRO-BNP    Collection Time: 04/09/17  4:49 AM   Result Value Ref Range    NT pro-BNP 4882 (H) 0 - 450 PG/ML   MAGNESIUM    Collection Time: 04/09/17  4:49 AM   Result Value Ref Range    Magnesium 2.3 1.6 - 2.4 mg/dL   EKG, 12 LEAD, INITIAL    Collection Time: 04/09/17  4:52 AM   Result Value Ref Range    Ventricular Rate 70 BPM    Atrial Rate 72 BPM    P-R Interval 224 ms    QRS Duration 90 ms    Q-T Interval 422 ms    QTC Calculation (Bezet) 455 ms    Calculated R Axis 24 degrees    Calculated T Axis -63 degrees    Diagnosis       Electronic atrial pacemaker  Septal infarct , age undetermined  ST & T wave abnormality, consider lateral ischemia  Abnormal ECG  When compared with ECG of 30-DEC-2016 03:20,  Electronic atrial pacemaker has replaced Electronic ventricular pacemaker         IMAGING RESULTS:  XR CHEST PA LAT   Final Result   EXAM: XR CHEST PA LAT     INDICATION: Shortness of breath since yesterday     COMPARISON: Chest views on 3/30/2016     TECHNIQUE: Upright AP and lateral chest views     FINDINGS: Pacemaker battery pack and leads are unchanged. Cardiac monitoring  wires overlie the thorax. Mild cardiomegaly is unchanged.      Vasculature is mildly prominent.      Bilateral interstitial pulmonary edema is mild. Small bilateral pleural  effusions. No pneumothorax. The visualized bones and upper abdomen are  age-appropriate.     IMPRESSION  IMPRESSION:     Mild CHF pattern of pulmonary edema is new since 10 days ago. MEDICATIONS GIVEN:  Medications   furosemide (LASIX) injection 20 mg (not administered)       IMPRESSION:  Acute respiratory failure with hypoxia  Acute systolic heart failure    PLAN:  1.  Admit to Hospitalist    Admission Note:  5:50 AM  Patient is being admitted to the hospital by Dr. Belen Soriano. The results of their tests and reasons for their admission have been discussed with them and available family. They convey agreement and understanding for the need to be admitted and for their admission diagnosis. Written by Revonda Snellen, ED Scribe, as dictated by Leigh Nieto MD.    This note is prepared by Revonda Snellen, acting as Scribe for  Leigh Nieto MD.    Leigh Nieto MD,: The scribe's documentation has been prepared under my direction and personally reviewed by me in its entirety. I confirm that the note above accurately reflects all work, treatment, procedures, and medical decision making performed by me.

## 2017-04-09 NOTE — PROGRESS NOTES
Reviewed chart @ 0568 AND talked to nurse on the floor  Ultrasound today - neg. for DVT    Study not order STAT - if needed STAT     PLEASE FOLLOW THE STAT PROTOCOL PROCEDURE EXT 2039    PLEASE DISCUSS WITH THE RADIOLOGIST @ [phone]    The radioactive material will be ordered for 4/10/2017 Monday morning @ 7:30 to be done.

## 2017-04-09 NOTE — IP AVS SNAPSHOT
Current Discharge Medication List  
  
START taking these medications Dose & Instructions Dispensing Information Comments Morning Noon Evening Bedtime  
 furosemide 40 mg tablet Commonly known as:  LASIX Your last dose was: Your next dose is:    
   
   
 Dose:  40 mg Take 1 Tab by mouth daily. Quantity:  30 Tab Refills:  12 CONTINUE these medications which have NOT CHANGED Dose & Instructions Dispensing Information Comments Morning Noon Evening Bedtime  
 amiodarone 200 mg tablet Commonly known as:  CORDARONE Your last dose was: Your next dose is: TAKE 1 TABLET BY MOUTH DAILY Quantity:  30 Tab Refills:  5  
     
   
   
   
  
 atorvastatin 20 mg tablet Commonly known as:  LIPITOR Your last dose was: Your next dose is:    
   
   
 Dose:  20 mg Take 1 Tab by mouth daily. Quantity:  90 Tab Refills:  3 Blood-Glucose Meter Misc Commonly known as:  FREESTYLE INSULINX Your last dose was: Your next dose is:    
   
   
 Test blood sugar once a week Quantity:  1 Each Refills:  11  
     
   
   
   
  
 cholecalciferol (vitamin D3) 4,000 unit Cap Commonly known as:  VITAMIN D3 Your last dose was: Your next dose is:    
   
   
 Dose:  4000 Units Take 4,000 Units by mouth daily. Quantity:  90 Tab Refills:  3  
     
   
   
   
  
 clotrimazole-betamethasone topical cream  
Commonly known as:  Elveria Mohit Your last dose was: Your next dose is:    
   
   
 Apply  to affected area two (2) times a day. Quantity:  15 g Refills:  0  
     
   
   
   
  
 glucose blood VI test strips strip Commonly known as:  FREESTYLE INSULINX Your last dose was: Your next dose is:    
   
   
 Test blood sugar daily and record Quantity:  1 Package Refills:  11 Lancets Misc Commonly known as:  FREESTYLE LANCETS Your last dose was: Your next dose is:    
   
   
 Check blood sugar daily and record Quantity:  1 Package Refills:  11  
     
   
   
   
  
 metoprolol tartrate 25 mg tablet Commonly known as:  LOPRESSOR Your last dose was: Your next dose is:    
   
   
 Dose:  25 mg Take 1 Tab by mouth two (2) times a day. Quantity:  180 Tab Refills:  3  
     
   
   
   
  
 valsartan 80 mg tablet Commonly known as:  DIOVAN Your last dose was: Your next dose is:    
   
   
 Dose:  80 mg Take 1 Tab by mouth daily. Quantity:  90 Tab Refills:  3 STOP taking these medications   
 amLODIPine 10 mg tablet Commonly known as:  NORVASC  
   
  
 chlorthalidone 50 mg tablet Commonly known as:  Rhianna Perez Where to Get Your Medications Information on where to get these meds will be given to you by the nurse or doctor. ! Ask your nurse or doctor about these medications  
  furosemide 40 mg tablet

## 2017-04-09 NOTE — IP AVS SNAPSHOT
[address] [facility] 
[phone] Patient: Marcus Heredia MRN: [id number] MSX:4[birth year]4/2750 You are allergic to the following Allergen Reactions Lisinopril Swelling  
 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 Katie Su office  will call  to schedule appt [facility] [address] [facility] 
[phone] Charlene Mcclendon MD On 4/25/2017 2:30 pm [facility] 
[phone]  [facility]  This is your Hospital to Home Provider. They will come out and do a one time assessment of your medical needs. Please call them if you have any questions. [address] [address] 
[phone] Your Appointments Tuesday April 25, 2017  2:45 PM EDT  
2 WEEK with Charlene Mcclendon MD  
[facility] [address] [place] [address]) [address] [facility]  
[phone] Current Discharge Medication List  
  
START taking these medications Dose & Instructions Dispensing Information Comments Morning Noon Evening Bedtime  
 furosemide 40 mg tablet Commonly known as:  LASIX Your last dose was: Your next dose is:    
   
   
 Dose:  40 mg Take 1 Tab by mouth daily. Quantity:  30 Tab Refills:  12 CONTINUE these medications which have NOT CHANGED Dose & Instructions Dispensing Information Comments Morning Noon Evening Bedtime  
 amiodarone 200 mg tablet Commonly known as:  CORDARONE Your last dose was: Your next dose is: TAKE 1 TABLET BY MOUTH DAILY Quantity:  30 Tab Refills:  5  
     
   
   
   
  
 atorvastatin 20 mg tablet Commonly known as:  LIPITOR Your last dose was: Your next dose is:    
   
   
 Dose:  20 mg Take 1 Tab by mouth daily. Quantity:  90 Tab Refills:  3 Blood-Glucose Meter Misc Commonly known as:  FREESTYLE INSULINX Your last dose was: Your next dose is:    
   
   
 Test blood sugar once a week Quantity:  1 Each Refills:  11  
     
   
   
   
  
 cholecalciferol (vitamin D3) 4,000 unit Cap Commonly known as:  VITAMIN D3 Your last dose was: Your next dose is:    
   
   
 Dose:  4000 Units Take 4,000 Units by mouth daily. Quantity:  90 Tab Refills:  3  
     
   
   
   
  
 clotrimazole-betamethasone topical cream  
Commonly known as:  Dakotah Ceballos  
   
 Your last dose was: Your next dose is:    
   
   
 Apply  to affected area two (2) times a day. Quantity:  15 g Refills:  0  
     
   
   
   
  
 glucose blood VI test strips strip Commonly known as:  FREESTYLE INSULINX Your last dose was: Your next dose is:    
   
   
 Test blood sugar daily and record Quantity:  1 Package Refills:  11 Lancets Misc Commonly known as:  FREESTYLE LANCETS Your last dose was: Your next dose is:    
   
   
 Check blood sugar daily and record Quantity:  1 Package Refills:  11  
     
   
   
   
  
 metoprolol tartrate 25 mg tablet Commonly known as:  LOPRESSOR Your last dose was: Your next dose is:    
   
   
 Dose:  25 mg Take 1 Tab by mouth two (2) times a day. Quantity:  180 Tab Refills:  3  
     
   
   
   
  
 valsartan 80 mg tablet Commonly known as:  DIOVAN Your last dose was: Your next dose is:    
   
   
 Dose:  80 mg Take 1 Tab by mouth daily. Quantity:  90 Tab Refills:  3 STOP taking these medications   
 amLODIPine 10 mg tablet Commonly known as:  NORVASC  
   
  
 chlorthalidone 50 mg tablet Commonly known as:  Florinda Franklin Where to Get Your Medications Information on where to get these meds will be given to you by the nurse or doctor. ! Ask your nurse or doctor about these medications  
  furosemide 40 mg tablet Discharge Instructions PATIENT DISCHARGE INSTRUCTIONS PATIENT DISCHARGE INSTRUCTIONS Arline Guerrier. / [id number] : 1925 Admitted 2017 Discharged: 2017 [email or web address] Activation Thank you for requesting access to [email or web address]. Please follow the instructions below to securely access and download your online medical record.  [email or web address] allows you to send messages to your doctor, view your test results, renew your prescriptions, schedule appointments, and more. How Do I Sign Up? 1. In your internet browser, go to [email or web address] 
2. Click on the First Time User? Click Here link in the Sign In box. You will be redirect to the New Member Sign Up page. 3. Enter your [employer] Access Code exactly as it appears below. You will not need to use this code after youve completed the sign-up process. If you do not sign up before the expiration date, you must request a new code. [employer] Access Code: [email or web address] Expires: 2017 10:07 AM (This is the date your [employer] access code will ) 4. Enter the last four digits of your Social Security Number (xxxx) and Date of Birth (mm/dd/yyyy) as indicated and click Submit. You will be taken to the next sign-up page. 5. Create a [employer] ID. This will be your [employer] login ID and cannot be changed, so think of one that is secure and easy to remember. 6. Create a [employer] password. You can change your password at any time. 7. Enter your Password Reset Question and Answer. This can be used at a later time if you forget your password. 8. Enter your e-mail address. You will receive e-mail notification when new information is available in [address] 9. Click Sign Up. You can now view and download portions of your medical record. 10. Click the Download Summary menu link to download a portable copy of your medical information. Additional Information If you have questions, please visit the Frequently Asked Questions section of the [employer] website at [email or web address] Remember, [employer] is NOT to be used for urgent needs. For medical emergencies, dial 911. · It is important that you take the medication exactly as they are prescribed. · Keep your medication in the bottles provided by the pharmacist and keep a list of the medication names, dosages, and times to be taken in your wallet. · Do not take other medications without consulting your doctor. What to do at [facility] Recommended Diet: Regular Diet Recommended Activity: Activity as tolerated If you experience any of the following symptoms increasing shortness of breath, please follow up with Dr. Lorenso Crigler. Signed By: Handy Zee MD   
 April 12, 2017 Discharge Instructions Attachments/References HEART FAILURE: AVOIDING TRIGGERS (ENGLISH) HEART FAILURE ZONES: GENERAL INFO (ENGLISH) HEART FAILURE: SELF-CARE: GENERAL INFO (ENGLISH) Discharge Orders None ACO Transitions of Care Introducing [employer] [address] offers a voluntary care coordination program to provide high quality service and care to [facility] fee-for-service beneficiaries. Noemi Arriola was designed to help you enhance your health and well-being through the following services: ? Transitions of Care  support for individuals who are transitioning from one care setting to another (example: Hospital to home). ? Chronic and Complex Care Coordination  support for individuals and caregivers of those with serious or chronic illnesses or with more than one chronic (ongoing) condition and those who take a number of different medications. If you meet specific medical criteria, a [facility] [address] may call you directly to coordinate your care with your primary care physician and your other care providers. For questions about the [facility] programs, please, contact your physicians office. For general questions or additional information about Accountable Care Organizations: 
Please visit www.medicare.gov/acos. html or call 1-800-MEDICARE (4-632.466.4763) TTY users should call 1-557.214.9473. MyChart Announcement  We are excited to announce that we are making your provider's discharge notes available to you in Therio. You will see these notes when they are completed and signed by the physician that discharged you from your recent hospital stay. If you have any questions or concerns about any information you see in Therio, please call the Health Information Department where you were seen or reach out to your Primary Care Provider for more information about your plan of care. Introducing [facility] SERVICES! Ely Walker introduces Therio patient portal. Now you can access parts of your medical record, email your doctor's office, and request medication refills online. 1. In your internet browser, go to [email or web address] 2. Click on the First Time User? Click Here link in the Sign In box. You will see the New Member Sign Up page. 3. Enter your Therio Access Code exactly as it appears below. You will not need to use this code after youve completed the sign-up process. If you do not sign up before the expiration date, you must request a new code. · Therio Access Code: [email or web address] Expires: 5/17/2017 10:07 AM 
 
4. Enter the last four digits of your Social Security Number (xxxx) and Date of Birth (mm/dd/yyyy) as indicated and click Submit. You will be taken to the next sign-up page. 5. Create a Therio ID. This will be your Therio login ID and cannot be changed, so think of one that is secure and easy to remember. 6. Create a Therio password. You can change your password at any time. 7. Enter your Password Reset Question and Answer. This can be used at a later time if you forget your password. 8. Enter your e-mail address. You will receive e-mail notification when new information is available in [address] 9. Click Sign Up. You can now view and download portions of your medical record. 10. Click the Download Summary menu link to download a portable copy of your medical information. If you have questions, please visit the Frequently Asked Questions section of the MyChart website. Remember, [employer] is NOT to be used for urgent needs. For medical emergencies, dial 911. Now available from your iPhone and Android! General Information Please provide this summary of care documentation to your next provider. Patient Signature:  ____________________________________________________________ Date:  ____________________________________________________________  
  
Leah Brought Provider Signature:  ____________________________________________________________ Date:  ____________________________________________________________ More Information Avoiding Triggers With Heart Failure: Care Instructions Your Care Instructions Triggers are anything that make your heart failure flare up. A flare-up is also called \"sudden heart failure\" or \"acute heart failure. \" When you have a flare-up, fluid builds up in your lungs, and you have problems breathing. You might need to go to the hospital. By watching for changes in your condition and avoiding triggers, you can prevent heart failure flare-ups. Follow-up care is a key part of your treatment and safety. Be sure to make and go to all appointments, and call your doctor if you are having problems. It's also a good idea to know your test results and keep a list of the medicines you take. How can you care for yourself at home? Watch for changes in your weight and condition · Weigh yourself without clothing at the same time each day. Record your weight. Call your doctor if you gain 3 pounds or more in 2 to 3 days. A sudden weight gain may mean that your heart failure is getting worse. · Keep a daily record of your symptoms. Write down any changes in how you feel, such as new shortness of breath, cough, or problems eating.  Also record if your ankles are more swollen than usual and if you have to urinate in the night more often. Note anything that you ate or did that could have triggered these changes. Limit sodium Sodium causes your body to hold on to extra water. This may cause your heart failure symptoms to get worse. People get most of their sodium from processed foods. Fast food and restaurant meals also tend to be very high in sodium. · Your doctor may suggest that you limit sodium to 2,000 milligrams (mg) a day or less. That is less than 1 teaspoon of salt a day, including all the salt you eat in cooking or in packaged foods. · Read food labels on cans and food packages. They tell you how much sodium you get in one serving. Check the serving size. If you eat more than one serving, you are getting more sodium. · Be aware that sodium can come in forms other than salt, including monosodium glutamate (MSG), sodium citrate, and sodium bicarbonate (baking soda). MSG is often added to Asian food. You can sometimes ask for food without MSG or salt. · Slowly reducing salt will help you adjust to the taste. Take the salt shaker off the table. · Flavor your food with garlic, lemon juice, onion, vinegar, herbs, and spices instead of salt. Do not use soy sauce, steak sauce, onion salt, garlic salt, mustard, or ketchup on your food, unless it is labeled \"low-sodium\" or \"low-salt. \" 
· Make your own salad dressings, sauces, and ketchup without adding salt. · Use fresh or frozen ingredients, instead of canned ones, whenever you can. Choose low-sodium canned goods. · Eat less processed food and food from restaurants, including fast food. Exercise as directed Moderate, regular exercise is very good for your heart. It improves your blood flow and helps control your weight. But too much exercise can stress your heart and cause a heart failure flare-up. · Check with your doctor before you start an exercise program. 
· Walking is an easy way to get exercise. Start out slowly.  Gradually increase the length and pace of your walk. Swimming, riding a bike, and using a treadmill are also good forms of exercise. · When you exercise, watch for signs that your heart is working too hard. You are pushing yourself too hard if you cannot talk while you are exercising. If you become short of breath or dizzy or have chest pain, stop, sit down, and rest. 
· Do not exercise when you do not feel well. Take medicines correctly · Take your medicines exactly as prescribed. Call your doctor if you think you are having a problem with your medicine. · Make a list of all the medicines you take. Include those prescribed to you by other doctors and any over-the-counter medicines, vitamins, or supplements you take. Take this list with you when you go to any doctor. · Take your medicines at the same time every day. It may help you to post a list of all the medicines you take every day and what time of day you take them. · Make taking your medicine as simple as you can. Plan times to take your medicines when you are doing other things, such as eating a meal or getting ready for bed. This will make it easier to remember to take your medicines. · Get organized. Use helpful tools, such as daily or weekly pill containers. When should you call for help? Call 911 if you have symptoms of sudden heart failure such as: 
· You have severe trouble breathing. · You cough up pink, foamy mucus. · You have a new irregular or rapid heartbeat. Call your doctor now or seek immediate medical care if: 
· You have new or increased shortness of breath. · You are dizzy or lightheaded, or you feel like you may faint. · You have sudden weight gain, such as 3 pounds or more in 2 to 3 days. · You have increased swelling in your legs, ankles, or feet. · You are suddenly so tired or weak that you cannot do your usual activities. Watch closely for changes in your health, and be sure to contact your doctor if you develop new symptoms. Where can you learn more? Go to http://shima-kathy.info/. Enter H388 in the search box to learn more about \"Avoiding Triggers With Heart Failure: Care Instructions. \" Current as of: November 15, 2016 Content Version: 11.2 © 0255-7424 [employer]. Care instructions adapted under license by [employer] (which disclaims liability or warranty for this information). If you have questions about a medical condition or this instruction, always ask your healthcare professional. [address] any warranty or liability for your use of this information. Learning About Heart Failure Zones What are heart failure zones? Heart failure zones give you an easy way to see changes in your heart failure symptoms. They also tell you when you need to get help. Check every day to see which zone you are in. Green zone. You are doing well. This is where you want to be. · Your weight is stable. This means it is not going up or down. · You breathe easily. · You are sleeping well. You are able to lie flat without shortness of breath. · You can do your usual activities. Yellow zone. Be careful. Your symptoms are changing. Call your doctor. · You have new or increased shortness of breath. · You are dizzy or lightheaded, or you feel like you may faint. · You have sudden weight gain, such as 3 pounds or more in 2 to 3 days. · You have increased swelling in your legs, ankles, or feet. · You are so tired or weak that you cannot do your usual activities. · You are not sleeping well. Shortness of breath wakes you up at night. You need extra pillows. Your doctor's name: ____________________________________________________________ Your doctor's contact information: _________________________________________________ Red zone. This is an emergency. Call 911. You have symptoms of sudden heart failure, such as: 
· You have severe trouble breathing. · You cough up pink, foamy mucus. · You have a new irregular or fast heartbeat. You have symptoms of a heart attack. These may include: · Chest pain or pressure, or a strange feeling in the chest. 
· Sweating. · Shortness of breath. · Nausea or vomiting. · Pain, pressure, or a strange feeling in the back, neck, jaw, or upper belly or in one or both shoulders or arms. · Lightheadedness or sudden weakness. · A fast or irregular heartbeat. If you have symptoms of a heart attack: After you call 911, the  may tell you to chew 1 adult-strength or 2 to 4 low-dose aspirin. Wait for an ambulance. Do not try to drive yourself. Follow-up care is a key part of your treatment and safety. Be sure to make and go to all appointments, and call your doctor if you are having problems. It's also a good idea to know your test results and keep a list of the medicines you take. Where can you learn more? Go to http://shima-kathy.info/. Enter T174 in the search box to learn more about \"Learning About Heart Failure Zones. \" Current as of: January 27, 2016 Content Version: 11.2 © 8778-6255 [employer], [employer]. Care instructions adapted under license by [employer] (which disclaims liability or warranty for this information). If you have questions about a medical condition or this instruction, always ask your healthcare professional. [place] [address] any warranty or liability for your use of this information. Learning About Self-Care for Heart Failure What is self-care for heart failure? Heart failure usually gets worse over time. But there are many things you can do to feel better, stay healthy longer, and avoid the hospital. 
Self-care means managing your health by doing certain things every day, like weighing yourself. It's about knowing which symptoms to watch for so you can avoid getting worse.  When you practice good self-care, you know when it's time to call your doctor and when your heart failure has turned into an emergency. The lists below can help. Top 5 self-care tips for every day 1. Take your medicines as prescribed. This gives them the best chance of helping you. 2. Watch for signs that you're getting worse. Weighing yourself every day is one of the best ways to do this. Weight gain may be a sign that your body is holding on to too much fluid. Weigh yourself at the same time each day, using the same scale, on a hard, flat surface. The best time is in the morning after you go to the bathroom and before you eat or drink anything. 3. Find out what your triggers are, and learn to avoid them. Triggers are things that make your heart failure worse, often suddenly. A trigger may be eating too much salt, missing a dose of your medicine, or exercising too hard. 4. Limit sodium. This helps keep fluid from building up and may help you feel better. Your doctor may suggest that you limit sodium to 2,000 milligrams (mg) a day or less. That's less than 1 teaspoon. You can stay under this amount by limiting the salt you eat at home and by watching for \"hidden\" sodium when you eat out or shop for food. 5. Try to exercise throughout the week. Exercise makes your heart stronger and can help you avoid symptoms. Walking is a great way to get exercise. If your doctor says it's safe, start out with some short walks, and then slowly build up to longer ones. When should you act? Try to become familiar with signs that mean your heart failure is getting worse. If you need help, talk with your doctor about making a personal plan. Here are some things to watch for as you practice your daily self-care. Call your doctor if: 
· You gain 3 pounds or more over 2 to 3 days. (Or your doctor may tell you how much weight to watch for.) · You have new or worse swelling in your feet, ankles, or legs. · Your breathing gets worse. Activities that did not make you short of breath before are hard for you now. · Your breathing when you lie down is worse than usual, or you wake up at night needing to catch your breath. Be sure to make and go to all of your follow-up appointments. And it's always a good idea to call your doctor anytime you have a sudden change in symptoms. When is it an emergency? Sometimes the symptoms get worse very quickly. This is called sudden heart failure. It causes fluid to build up in your lungs. Sudden heart failure is an emergency. If you have any of these symptoms, you need care right away. Call 911 if: 
· You have severe shortness of breath. · You have an irregular or fast heartbeat. · You cough up foamy, pink mucus. What else can you do to stay healthy? There are other things you can do to take care of your body and your heart. These things will help you feel better. And they will also reduce your risk of heart attack and stroke. · Try to stay at a healthy weight. Eat a healthy diet with lots of fresh fruit, vegetables, and whole grains. · If you smoke, quit. · Limit the amount of alcohol you drink. · Keep high blood pressure and diabetes under control. If you need help, talk with your doctor. If your doctor has not set you up with a cardiac rehabilitation (rehab) program, talk to him or her about whether that is right for you. Cardiac rehab includes exercise, help with diet and lifestyle changes, and emotional support. Also let your doctor know if: 
· You're having trouble sleeping. Sleep is important to your well-being. It also helps your heart work the way it's supposed to. Your doctor can help you decide if you need treatment for sleep problems. · You're feeling sad and hopeless much of the time, or you are worried and anxious. Heart failure can be hard on your emotions. Treatment with counseling and medicine can help.  And when you feel better, you're more likely to take care of yourself. Follow-up care is a key part of your treatment and safety. Be sure to make and go to all appointments, and call your doctor if you are having problems. It's also a good idea to know your test results and keep a list of the medicines you take. Where can you learn more? Go to http://shima-kathy.info/. Enter O978 in the search box to learn more about \"Learning About Self-Care for Heart Failure. \" Current as of: November 15, 2016 Content Version: 11.2 © 1090-7922 [employer]. Care instructions adapted under license by [employer] (which disclaims liability or warranty for this information). If you have questions about a medical condition or this instruction, always ask your healthcare professional. [address] any warranty or liability for your use of this information.

## 2017-04-09 NOTE — PROGRESS NOTES
TRANSFER - IN REPORT:    Verbal report received from Dariela Garg RN(name) on Osbaldo Lopez.  being received from ED(unit) for routine progression of care      Report consisted of patients Situation, Background, Assessment and   Recommendations(SBAR). Information from the following report(s) SBAR, ED Summary, Intake/Output, MAR and Recent Results was reviewed with the receiving nurse. Opportunity for questions and clarification was provided. Assessment completed upon patients arrival to unit and care assumed.    Pratima Flores RN
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
## 2017-04-09 NOTE — CONSULTS
Subjective:                 [id number] [phone]    Date of  Admission: 4/9/2017  4:30 AM     Admission type:Emergency    Shabana Murillo is a 80 y.o. male admitted for Acute on chronic systolic (congestive) heart failure ([address]*. He has a nl lvef with pul htn as per echo in 2016. He was noted to b av paced upto 130 this am. He has sob when laying flat. He denies any chest pain.     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 01/29/2016    Diabetic peripheral neuropathy associated with type 2 diabetes mellitus ([address] 01/29/2016    Idiopathic small and large fiber sensory neuropathy 01/29/2016    Dizziness and giddiness 07/30/2015    Memory loss 07/30/2015    SVT (supraventricular tachycardia) ([address] 12/10/2013    Pacemaker 08/21/2013    SSS (sick sinus syndrome) ([address] 08/06/2013    PVC (premature ventricular contraction) 08/06/2013    A-fib ([address] 08/06/2013    Vertigo 02/16/2012    Vitamin D deficiency 11/30/2009    DM type 2 (diabetes mellitus, type 2) ([address] 10/28/2009    HTN (hypertension) 10/28/2009    Hyperlipidemia 10/28/2009    ED (erectile dysfunction) 10/28/2009      Arnaud Springer MD  Past Medical History:   Diagnosis Date    Arrhythmia     Arthritis     Carpal tunnel syndrome 2006    CKD (chronic kidney disease) 3/2/2017    DM type 2 (diabetes mellitus, type 2) ([address] 10/28/2009    ED (erectile dysfunction) 10/28/2009    HTN (hypertension) 10/28/2009    Hyperlipidemia 10/28/2009    Neuropathy 10/28/2009    Other ill-defined conditions     hyperlipidemia    Pacemaker     SDH (subdural hematoma) ([address]     Systolic CHF ([address] 61/23/8496    Vertigo 2007      Past Surgical History:   Procedure Laterality Date  HX PACEMAKER      NEUROLOGICAL PROCEDURE UNLISTED  1999    evacuation SDH; At [facility].        Allergies   Allergen Reactions    Lisinopril Swelling     angioedema      Social History   Substance Use Topics    Smoking status: Never Smoker    Smokeless tobacco: Never Used    Alcohol use No     Family History   Problem Relation Age of Onset    Stroke Mother     Pneumonia Father     MS Child     Cancer Child      breast    Hearing Impairment Child       Current Facility-Administered Medications   Medication Dose Route Frequency    amiodarone (CORDARONE) tablet 200 mg  200 mg Oral DAILY    atorvastatin (LIPITOR) tablet 20 mg  20 mg Oral DAILY    cholecalciferol (VITAMIN D3) tablet 4,000 Units  4,000 Units Oral DAILY    clotrimazole-betamethasone (LOTRISONE) 1-0.05 % cream   Topical BID    metoprolol tartrate (LOPRESSOR) tablet 12.5 mg  12.5 mg Oral BID    sodium chloride (NS) flush 5-10 mL  5-10 mL IntraVENous Q8H    sodium chloride (NS) flush 5-10 mL  5-10 mL IntraVENous PRN    acetaminophen (TYLENOL) tablet 500 mg  500 mg Oral Q4H PRN    oxyCODONE-acetaminophen (PERCOCET) 5-325 mg per tablet 1 Tab  1 Tab Oral Q4H PRN    morphine injection 1 mg  1 mg IntraVENous Q4H PRN    naloxone (NARCAN) injection 0.4 mg  0.4 mg IntraVENous PRN    ondansetron (ZOFRAN) injection 4 mg  4 mg IntraVENous Q4H PRN    bisacodyl (DULCOLAX) tablet 5 mg  5 mg Oral DAILY PRN    heparin (porcine) injection 5,000 Units  5,000 Units SubCUTAneous Q8H    furosemide (LASIX) injection 20 mg  20 mg IntraVENous BID    insulin lispro (HUMALOG) injection   SubCUTAneous AC&HS    glucose chewable tablet 16 g  4 Tab Oral PRN    dextrose (D50W) injection syrg 12.5-25 g  12.5-25 g IntraVENous PRN    glucagon (GLUCAGEN) injection 1 mg  1 mg IntraMUSCular PRN         Review of Symptoms:  Constitutional: negative  Eyes: negative  Ears, nose, mouth, throat, and face: negative  Respiratory: sob  Cardiovascular: negative  Gastrointestinal: negative  Genitourinary: negative  Musculoskeletal: negative  Neurological: negative  Behvioral/Psych: negative  Endocrine: negative     Subjective:      Visit Vitals    /69 (BP 1 Location: Left arm, BP Patient Position: Sitting)    Pulse (!) 104    Temp 98 °F (36.7 °C)    Resp 22    Ht 5' 6\" (1.676 m)    Wt 140 lb 11.2 oz (63.8 kg)    SpO2 99%    BMI 22.71 kg/m2       Physical Exam  Abdomen: soft, non-tender. Extremities: extremities normal  Heart: regular rate and rhythm  Lungs: clear to auscultation bilaterally  Pulses: 2+ and symmetric    Cardiographics    Telemetry: a paced    Echocardiogram: pending    Labs:  Recent Labs      04/09/17   0449   WBC  7.8   HGB  13.1   HCT  39.5   PLT  173     Recent Labs      04/09/17   0449   NA  141   K  3.8   CL  107   CO2  23   GLU  84   BUN  27*   CREA  1.91*   CA  8.8   MG  2.3   ALB  3.5   TBILI  0.8   SGOT  22   ALT  34       Recent Labs      04/09/17   0449   TROIQ  0.05*   CPK  114   CKMB  2.5         Intake/Output Summary (Last 24 hours) at 04/09/17 1115  Last data filed at 04/09/17 1043   Gross per 24 hour   Intake                0 ml   Output              500 ml   Net             -500 ml         Assessment:     Assessment:       Principal Problem:    Systolic CHF, acute ([facility] (4/9/2017)    Active Problems:    HTN (hypertension) (10/28/2009)      Hyperlipidemia (10/28/2009)      Pacemaker (8/21/2013)      Overview: Dual chamber-[employer]      Edema (4/9/2017)      Type 2 DM with CKD stage 3 and hypertension ([facility] (4/9/2017)         Plan:     Nadia Tate is a pleasant gentleman with sob. He has diuresed this arrival. He is to receive an ultrasound to r/o dvt and we will interrogate his pacemaker to r/o PMT. Will dc amio in light of sob. lvef was wnl last year - sob could be secondary to pul htn. Will f/u after pacer interrogation and echo. Thank you for this consultation.     Michael Ocasio MD, [facility]    4/9/2017

## 2017-04-10 PROBLEM — Z71.89 ACP (ADVANCE CARE PLANNING): Status: ACTIVE | Noted: 2017-01-01

## 2017-04-10 PROBLEM — R06.02 SHORTNESS OF BREATH: Status: ACTIVE | Noted: 2017-01-01

## 2017-04-10 PROBLEM — Z71.89 GOALS OF CARE, COUNSELING/DISCUSSION: Status: ACTIVE | Noted: 2017-01-01

## 2017-04-10 NOTE — DIABETES MGMT
DTC Progress Note    Recommendations/ Comments: If appropriate, please consider decreasing correctional insulin to high sensitivity. Chart reviewed on [facility]. BG <80 mg/dl. Patient is a 80 y.o. male with known DM complicated by CKD3 on no DM meds at home. A1c:   Lab Results   Component Value Date/Time    Hemoglobin A1c 5.6 11/03/2016 10:32 AM    Hemoglobin A1c 5.9 05/31/2016 08:44 AM       Recent Glucose Results:   Lab Results   Component Value Date/Time    GLU 68 04/10/2017 02:00 AM     (H) 04/09/2017 02:38 PM    GLUCPOC 70 04/10/2017 08:02 AM    GLUCPOC 139 (H) 04/09/2017 09:17 PM    GLUCPOC 147 (H) 04/09/2017 04:35 PM        Lab Results   Component Value Date/Time    Creatinine 1.96 04/10/2017 02:00 AM       Active Orders   Diet    DIET CARDIAC Regular; 2 GM NA (House Low NA); FR 1500ML        PO intake: No data found. Current hospital DM medication: Lispro correctional insulin - normal sensitivity. Will continue to follow as needed.     Thank you  Pino Miles RD CDE

## 2017-04-10 NOTE — PROGRESS NOTES
Message left for Palliative Medicine to speak with family who has arrived with his Advance Directives.

## 2017-04-10 NOTE — PROGRESS NOTES
Cardiology Progress Note            [address] [address]  [phone]    4/10/2017 8:47 AM    Admit Date: 4/9/2017    Admit Diagnosis: Acute on chronic systolic (congestive) heart failure ([address] *    Subjective:     Nadia Bryant. Feels better. Still having some tachycardia and is for vq scan today.     Visit Vitals    BP 95/55 (BP 1 Location: Left arm, BP Patient Position: Sitting)    Pulse (!) 115    Temp 98 °F (36.7 °C)    Resp 16    Ht 5' 6\" (1.676 m)    Wt 140 lb 11.2 oz (63.8 kg)    SpO2 99%    BMI 22.71 kg/m2     Current Facility-Administered Medications   Medication Dose Route Frequency    atorvastatin (LIPITOR) tablet 20 mg  20 mg Oral DAILY    cholecalciferol (VITAMIN D3) tablet 4,000 Units  4,000 Units Oral DAILY    clotrimazole-betamethasone (LOTRISONE) 1-0.05 % cream   Topical BID    metoprolol tartrate (LOPRESSOR) tablet 12.5 mg  12.5 mg Oral BID    sodium chloride (NS) flush 5-10 mL  5-10 mL IntraVENous Q8H    sodium chloride (NS) flush 5-10 mL  5-10 mL IntraVENous PRN    acetaminophen (TYLENOL) tablet 500 mg  500 mg Oral Q4H PRN    oxyCODONE-acetaminophen (PERCOCET) 5-325 mg per tablet 1 Tab  1 Tab Oral Q4H PRN    morphine injection 1 mg  1 mg IntraVENous Q4H PRN    naloxone (NARCAN) injection 0.4 mg  0.4 mg IntraVENous PRN    ondansetron (ZOFRAN) injection 4 mg  4 mg IntraVENous Q4H PRN    bisacodyl (DULCOLAX) tablet 5 mg  5 mg Oral DAILY PRN    heparin (porcine) injection 5,000 Units  5,000 Units SubCUTAneous Q8H    furosemide (LASIX) injection 20 mg  20 mg IntraVENous BID    insulin lispro (HUMALOG) injection   SubCUTAneous AC&HS    glucose chewable tablet 16 g  4 Tab Oral PRN    dextrose (D50W) injection syrg 12.5-25 g  12.5-25 g IntraVENous PRN    glucagon (GLUCAGEN) injection 1 mg  1 mg IntraMUSCular PRN         Objective:      Visit Vitals    BP 95/55 (BP 1 Location: Left arm, BP Patient Position: Sitting)    Pulse (!) 115    Temp 98 °F (36.7 °C)    Resp 16    Ht 5' 6\" (1.676 m)    Wt 140 lb 11.2 oz (63.8 kg)    SpO2 99%    BMI 22.71 kg/m2       Physical Exam:  Abdomen: soft, non-tender  Extremities: extremities normal  Heart: regular rate and rhythm  Lungs: clear to auscultation bilaterally  Pulses: 2+ and symmetric    Data Review:   Labs:    Recent Labs      04/09/17   0449   WBC  7.8   HGB  13.1   HCT  39.5   PLT  173     Recent Labs      04/10/17   0200  04/09/17   1438  04/09/17   0449   NA  142  140  141   K  3.6  3.8  3.8   CL  106  105  107   CO2  25  22  23   GLU  68  151*  84   BUN  28*  27*  27*   CREA  1.96*  2.05*  1.91*   CA  8.6  8.5  8.8   MG   --    --   2.3   ALB   --    --   3.5   TBILI   --    --   0.8   SGOT   --    --   22   ALT   --    --   34       Recent Labs      04/09/17   0449   TROIQ  0.05*   CPK  114   CKMB  2.5         Intake/Output Summary (Last 24 hours) at 04/10/17 0847  Last data filed at 04/09/17 2128   Gross per 24 hour   Intake              730 ml   Output              900 ml   Net             -170 ml        Telemetry: s tach    Pacer - no pmt    Assessment:     Principal Problem:    Systolic CHF, acute ([facility] (4/9/2017)    Active Problems:    HTN (hypertension) (10/28/2009)      Hyperlipidemia (10/28/2009)      Pacemaker (8/21/2013)      Overview: Dual chamber-[employer]      Edema (4/9/2017)      Type 2 DM with CKD stage 3 and hypertension ([facility] (4/9/2017)        Plan:     Frances Ceja. is doing better. He has diuresed. For v/q scan this am. Pacemaker interrogation wnl. Will sign off. Please call with ?s.  Can f/u in 1-2 weeks as outpatient    Sussy Brooks MD, [facility]    4/10/2017

## 2017-04-10 NOTE — PROGRESS NOTES
General Daily Progress Note    Admit Date: 4/9/2017  Hospital day 3    Subjective:     Patient has no complaint of shortness of breath or chest tightness. Didn't have oxygen at home prior to admission  Medication side effects: none    Current Facility-Administered Medications   Medication Dose Route Frequency    atorvastatin (LIPITOR) tablet 20 mg  20 mg Oral DAILY    cholecalciferol (VITAMIN D3) tablet 4,000 Units  4,000 Units Oral DAILY    clotrimazole-betamethasone (LOTRISONE) 1-0.05 % cream   Topical BID    metoprolol tartrate (LOPRESSOR) tablet 12.5 mg  12.5 mg Oral BID    sodium chloride (NS) flush 5-10 mL  5-10 mL IntraVENous Q8H    sodium chloride (NS) flush 5-10 mL  5-10 mL IntraVENous PRN    acetaminophen (TYLENOL) tablet 500 mg  500 mg Oral Q4H PRN    oxyCODONE-acetaminophen (PERCOCET) 5-325 mg per tablet 1 Tab  1 Tab Oral Q4H PRN    morphine injection 1 mg  1 mg IntraVENous Q4H PRN    naloxone (NARCAN) injection 0.4 mg  0.4 mg IntraVENous PRN    ondansetron (ZOFRAN) injection 4 mg  4 mg IntraVENous Q4H PRN    bisacodyl (DULCOLAX) tablet 5 mg  5 mg Oral DAILY PRN    heparin (porcine) injection 5,000 Units  5,000 Units SubCUTAneous Q8H    furosemide (LASIX) injection 20 mg  20 mg IntraVENous BID    insulin lispro (HUMALOG) injection   SubCUTAneous AC&HS    glucose chewable tablet 16 g  4 Tab Oral PRN    dextrose (D50W) injection syrg 12.5-25 g  12.5-25 g IntraVENous PRN    glucagon (GLUCAGEN) injection 1 mg  1 mg IntraMUSCular PRN        Review of Systems  Pertinent items are noted in HPI.     Objective:     Patient Vitals for the past 8 hrs:   BP Temp Pulse Resp SpO2   04/10/17 0204 100/55 98.7 °F (37.1 °C) 70 16 94 %        04/08 1901 - 04/10 0700  In: 730 [P.O.:730]  Out: 1275 [Urine:1275]    Physical Exam:   Visit Vitals    /55 (BP 1 Location: Left arm, BP Patient Position: At rest)    Pulse 70    Temp 98.7 °F (37.1 °C)    Resp 16    Ht 5' 6\" (1.676 m)    Wt 140 lb 11.2 oz (63.8 kg)    SpO2 94%    BMI 22.71 kg/m2     Lungs: rales R base  Heart: regular rate and rhythm, S1, S2 normal, no murmur, click, rub or gallop  Abdomen: soft, non-tender.  Bowel sounds normal. No masses,  no organomegaly  Extremities: edema , tr edema on R.       ECG: sinus tachycardia     Data Review   Recent Results (from the past 24 hour(s))   GLUCOSE, POC    Collection Time: 04/09/17  8:20 AM   Result Value Ref Range    Glucose (POC) 111 (H) 65 - 100 mg/dL    Performed by Ilwaco Beam (PCT)    URINALYSIS W/ REFLEX CULTURE    Collection Time: 04/09/17  8:44 AM   Result Value Ref Range    Color YELLOW/STRAW      Appearance CLEAR CLEAR      Specific gravity 1.011 1.003 - 1.030      pH (UA) 5.5 5.0 - 8.0      Protein NEGATIVE  NEG mg/dL    Glucose NEGATIVE  NEG mg/dL    Ketone NEGATIVE  NEG mg/dL    Bilirubin NEGATIVE  NEG      Blood NEGATIVE  NEG      Urobilinogen 1.0 0.2 - 1.0 EU/dL    Nitrites NEGATIVE  NEG      Leukocyte Esterase NEGATIVE  NEG      WBC 0-4 0 - 4 /hpf    RBC 0-5 0 - 5 /hpf    Epithelial cells FEW FEW /lpf    Bacteria NEGATIVE  NEG /hpf    UA:UC IF INDICATED CULTURE NOT INDICATED BY UA RESULT CNI      Hyaline cast 0-2 0 - 5 /lpf   GLUCOSE, POC    Collection Time: 04/09/17 11:52 AM   Result Value Ref Range    Glucose (POC) 92 65 - 100 mg/dL    Performed by Jeovany Beam (PCT)    METABOLIC PANEL, BASIC    Collection Time: 04/09/17  2:38 PM   Result Value Ref Range    Sodium 140 136 - 145 mmol/L    Potassium 3.8 3.5 - 5.1 mmol/L    Chloride 105 97 - 108 mmol/L    CO2 22 21 - 32 mmol/L    Anion gap 13 5 - 15 mmol/L    Glucose 151 (H) 65 - 100 mg/dL    BUN 27 (H) 6 - 20 MG/DL    Creatinine 2.05 (H) 0.70 - 1.30 MG/DL    BUN/Creatinine ratio 13 12 - 20      GFR est AA 37 (L) >60 ml/min/1.73m2    GFR est non-AA 31 (L) >60 ml/min/1.73m2    Calcium 8.5 8.5 - 10.1 MG/DL   GLUCOSE, POC    Collection Time: 04/09/17  4:35 PM   Result Value Ref Range    Glucose (POC) 147 (H) 65 - 100 mg/dL    Performed by Magaly Teixeira (PCT)    GLUCOSE, POC    Collection Time: 04/09/17  9:17 PM   Result Value Ref Range    Glucose (POC) 139 (H) 65 - 100 mg/dL    Performed by Magaly Teixeira (PCT)    METABOLIC PANEL, BASIC    Collection Time: 04/10/17  2:00 AM   Result Value Ref Range    Sodium 142 136 - 145 mmol/L    Potassium 3.6 3.5 - 5.1 mmol/L    Chloride 106 97 - 108 mmol/L    CO2 25 21 - 32 mmol/L    Anion gap 11 5 - 15 mmol/L    Glucose 68 65 - 100 mg/dL    BUN 28 (H) 6 - 20 MG/DL    Creatinine 1.96 (H) 0.70 - 1.30 MG/DL    BUN/Creatinine ratio 14 12 - 20      GFR est AA 39 (L) >60 ml/min/1.73m2    GFR est non-AA 32 (L) >60 ml/min/1.73m2    Calcium 8.6 8.5 - 10.1 MG/DL           Assessment:     Principal Problem:    Systolic CHF, acute (HCC) (4/9/2017)    Active Problems:    HTN (hypertension) (10/28/2009)      Hyperlipidemia (10/28/2009)      Pacemaker (8/21/2013)      Overview: Dual chamber-[employer]      Edema (4/9/2017)      Type 2 DM with CKD stage 3 and hypertension ([facility] [address] (4/9/2017)        Plan:     1. Diastolic heart failure- contineu Lasix. I/O 930/1275 yesterday. Symptomatically better. 2. Chronic renal failure  3. Edema legs, right greater than L- Ultrasound negative for DVT  4. Pulmonary hypertension on echo 8-2016  5. Pacemaker  6. Sinus tachycardia- for repeat echo, VQ scan. Check thyroid also  7. Mild hypoxemia- will do overnight pulse ox study prior to discharge. He didn't have home oxygen on admission.

## 2017-04-10 NOTE — PROGRESS NOTES
Bedside and Verbal shift change report given to Fernando Perez by Sumaya Chavis, RN. Report included the following information SBAR, Kardex, Intake/Output, MAR, Recent Results and Cardiac Rhythm Paced. [address] [facility] SHIFT NURSING NOTE    Bedside and Verbal shift change report given to Nurse by Carter Rivas RN . Report included the following information SBAR, Kardex, Intake/Output, MAR, Recent Results and Cardiac Rhythm AFIB/ Paced. SHIFT SUMMARY:     0802: Patients BG 70. Gave patient OJ. Will recheck    0830: Patient off floor for ultrasound. Will recheck BG when patient returns    [id number]: Patient returned to floor. BG 78. Gave patient orange juice and patient is starting breakfast tray    1000: Patients .    1649: Patients BG 72. Gave patient OJ. WIll recheck     1710: Patients BG 92. Will monitor     *Patient has rested comfortably today. Has had a couple hypoglycemic episodes. Missed breakfast, due to ultrasound. However, he did eat his lunch and dinner. Unable to accurately document output as patient is forgetful and frequently dumps urinal on his own.        Admission Date 4/9/2017   Admission Diagnosis Acute on chronic systolic (congestive) heart failure (HCC)  Type 2 DM with CKD stage 3 and hypertension ([facility] [address]  Edema   Consults IP CONSULT TO PRIMARY CARE PROVIDER  IP CONSULT TO PALLIATIVE CARE - PROVIDER  IP CONSULT TO NEPHROLOGY  IP CONSULT TO CARDIOLOGY        Consults   [x] PT   [x] OT   [] Speech   [x] Palliative      [] Hospice    [] Case Management   [] None   Cardiac Monitoring   [x] Yes   [] No     Antibiotics   [] Yes   [x] No   GI Prophylaxis  (Ex: Protonix, Pepcid, etc,.)   [] Yes   [x] No          DVT Prophylaxis   SCDs:             Brandon stockings:         [x] Medication (Ex: Lovenox, Eliquis, Brilinta, Coumadin,  Heparin, etc..)   [] Contraindicated   [] No VTE needed       Urinary Catheter             LDAs               Peripheral IV 04/09/17 Right Hand (Active)   Site Assessment Clean, dry, & intact 4/10/2017  8:00 AM   Phlebitis Assessment 0 4/10/2017  8:00 AM   Infiltration Assessment 0 4/10/2017  8:00 AM   Dressing Status Clean, dry, & intact 4/10/2017  8:00 AM   Dressing Type Transparent;Tape 4/10/2017  8:00 AM   Hub Color/Line Status Pink;Flushed;Patent 4/10/2017  8:00 AM                      I/Os   Intake/Output Summary (Last 24 hours) at 04/10/17 1828  Last data filed at 04/10/17 1828   Gross per 24 hour   Intake             1050 ml   Output              675 ml   Net              375 ml         Activity Level Activity Level: Up with Assistance     Activity Assistance: Partial (one person)   Diet Active Orders   Diet    DIET CARDIAC Regular; 2 GM NA (House Low NA); FR 1500ML      Purposeful Rounding every 1-2 hour? [x] Yes    Ruth Score  Total Score: 3   Bed Alarm (If score 3 or >)   [x] Yes    [] Refused (See signed refusal form in chart)   Trever Score  Trever Score: 19       Trever Score (if score 14 or less)   [] PMT consult   [] Nutrition consult   [] Wound Care consult      []  Specialty bed         Influenza Vaccine Received Flu Vaccine for Current Season (usually Sept-March): Yes               Needs prior to discharge:   Home O2 required:    [] Yes   [x] No     If yes, how much O2 required?     Other:    Last Bowel Movement Date: 04/10/17   Readmission Risk Assessment Tool Score High Risk            53       Total Score        3 Relationship with PCP    2 Patient Living Status    4 More than 1 Admission in calendar year    5 Patient Insurance is Medicare, Medicaid or Self Pay    39 Charlson Comorbidity Score        Criteria that do not apply:    Patient Length of Stay > 5       Expected Length of Stay 4d 14h   Actual Length of Stay 1

## 2017-04-10 NOTE — PROGRESS NOTES
Problem: Mobility Impaired (Adult and Pediatric)  Goal: *Acute Goals and Plan of Care (Insert Text)  Physical Therapy Goals  Initiated 4/10/2017  1. Patient will move from supine to sit and sit to supine , scoot up and down and roll side to side in bed with independence within 7 day(s). 2. Patient will transfer from bed to chair and chair to bed with independence using the least restrictive device within 7 day(s). 3. Patient will perform sit to stand with independence within 7 day(s). 4. Patient will ambulate with modified independence for 150 feet with the least restrictive device within 7 day(s). 5. Patient will ascend/descend 12 stairs with one sided handrail(s) with modified independence within 7 day(s). PHYSICAL THERAPY EVALUATION  Patient: Fabiola Pineda (80 y.o. male)  Date: 4/10/2017  Primary Diagnosis: Acute on chronic systolic (congestive) heart failure (HCC)  Type 2 DM with CKD stage 3 and hypertension (HCC)  Edema        Precautions:   Bed Alarm, DNR      ASSESSMENT :  Based on the objective data described below, the patient presents with decreased strength, decreased functional mobility, decreased endurance, and mildly unsteady gait following admission for acute on chronic CHF. PTA patient lives with his wife. He is independent with all aspects of functional mobility and ADLs, reporting he occasionally uses a [facility] for community ambulation. He reports a fall in December with no injury. Currently, patient received in bed on 2L O2. Patient required supervision for bed mobility. He required SBA for sit <> stand. He ambulated 40 feet with CGA without AD. Patient with one mild LOB posteriorly in the bathroom, requiring CGA for stability. Patient's O2 sats remained 93% on 2L O2 with activity. Patient left sitting in the chair with the bed alarm on at the conclusion of PT evaluation. Patient will benefit from [facility] PT vs none at discharge pending progress.        Patient will benefit from skilled intervention to address the above impairments. Patients rehabilitation potential is considered to be Good  Factors which may influence rehabilitation potential include:   [ ]         None noted  [ ]         Mental ability/status  [X]         Medical condition  [ ]         Home/family situation and support systems  [ ]         Safety awareness  [ ]         Pain tolerance/management  [ ]         Other:        PLAN :  Recommendations and Planned Interventions:  [X]           Bed Mobility Training             [ ]    Neuromuscular Re-Education  [X]           Transfer Training                   [ ]    Orthotic/Prosthetic Training  [X]           Gait Training                         [ ]    Modalities  [X]           Therapeutic Exercises           [ ]    Edema Management/Control  [X]           Therapeutic Activities            [X]    Patient and Family Training/Education  [ ]           Other (comment):     Frequency/Duration: Patient will be followed by physical therapy  3 times a week to address goals. Discharge Recommendations: Home Health vs none  Further Equipment Recommendations for Discharge: [facility]       SUBJECTIVE:   Patient stated I'm doing pretty well.       OBJECTIVE DATA SUMMARY:   HISTORY:    Past Medical History:   Diagnosis Date    Arrhythmia      Arthritis      Carpal tunnel syndrome 2006    CKD (chronic kidney disease) 3/2/2017    DM type 2 (diabetes mellitus, type 2) ([facility] [address] 10/28/2009    ED (erectile dysfunction) 10/28/2009    HTN (hypertension) 10/28/2009    Hyperlipidemia 10/28/2009    Neuropathy 10/28/2009    Other ill-defined conditions       hyperlipidemia    Pacemaker      SDH (subdural hematoma) ([facility] [address]      Systolic CHF ([facility] [address] 02/64/0400    Vertigo 2007     Past Surgical History:   Procedure Laterality Date    HX PACEMAKER        NEUROLOGICAL PROCEDURE UNLISTED   1999     evacuation SDH; At [facility]. Prior Level of Function/Home Situation: patient lives with his wife.  He is independent with all aspects of functional mobility and ADLs, reporting he occasionally uses a [facility] for community ambulation. He reports a fall in December with no injury. Personal factors and/or comorbidities impacting plan of care: fall in December 2016     [address]: Private residence  # Steps to Enter: 3  Rails to Enter: Yes  Hand Rails : Right  One/Two Story Residence: Two story (tri level)  # of Interior Steps: 10  Interior Rails: Right  Living Alone: No  Support Systems: Family member(s), Spouse/Significant Other/Partner  Patient Expects to be Discharged to[de-identified] Private residence  Current DME Used/Available at Home: Cane, straight     EXAMINATION/PRESENTATION/DECISION MAKING:   Critical Behavior:              Hearing: Auditory  Auditory Impairment: Hard of hearing, bilateral  Skin:  Appears intact  Edema: none  Range Of Motion:  AROM: Within functional limits           PROM: Within functional limits           Strength:    Strength: Generally decreased, functional                    Tone & Sensation:   Tone: Normal              Sensation: Intact               Coordination:  Coordination: Within functional limits  Vision:      Functional Mobility:  Bed Mobility:  Rolling: Supervision  Supine to Sit: Supervision     Scooting: Supervision  Transfers:  Sit to Stand: Supervision  Stand to Sit: Supervision        Bed to Chair: Contact guard assistance              Balance:   Sitting: Intact; Without support  Standing: Impaired; Without support  Standing - Static: Good  Standing - Dynamic : Fair  Ambulation/Gait Training:  Distance (ft): 40 Feet (ft)  Assistive Device: Gait belt  Ambulation - Level of Assistance: Contact guard assistance     Gait Description (WDL): Exceptions to WDL  Gait Abnormalities: Decreased step clearance;Trunk sway increased        Base of Support: Narrowed     Speed/Clover: Pace decreased (<100 feet/min)  Step Length: Right shortened;Left shortened Stairs: Therapeutic Exercises:         Functional Measure:  Barthel Index:      Bathin  Bladder: 10  Bowels: 10  Groomin  Dressing: 10  Feeding: 10  Mobility: 0  Stairs: 0  Toilet Use: 10  Transfer (Bed to Chair and Back): 10  Total: 70         Barthel and G-code impairment scale:  Percentage of impairment CH  0% CI  1-19% CJ  20-39% CK  40-59% CL  60-79% CM  80-99% CN  100%   Barthel Score 0-100 100 99-80 79-60 59-40 20-39 1-19    0   Barthel Score 0-20 20 17-19 13-16 9-12 5-8 1-4 0      The Barthel ADL Index: Guidelines  1. The index should be used as a record of what a patient does, not as a record of what a patient could do. 2. The main aim is to establish degree of independence from any help, physical or verbal, however minor and for whatever reason. 3. The need for supervision renders the patient not independent. 4. A patient's performance should be established using the best available evidence. Asking the patient, friends/relatives and nurses are the usual sources, but direct observation and common sense are also important. However direct testing is not needed. 5. Usually the patient's performance over the preceding 24-48 hours is important, but occasionally longer periods will be relevant. 6. Middle categories imply that the patient supplies over 50 per cent of the effort. 7. Use of aids to be independent is allowed. Nadia Lopez., Barthel DDaveW. (1856). Functional evaluation: the Barthel Index. [address] [facility] (14)2. SAIGE Vaughn, Arie Dietz.Ana., [place], [address] [place] [address] (). Measuring the change indisability after inpatient rehabilitation; comparison of the responsiveness of the Barthel Index and Functional [place] Measure. Journal of Neurology, Neurosurgery, and Psychiatry, 66(4), 415-376.   ROQUE Díaz.FAVIOLA, ALICIA Ya, & Saulo Cobb MDaveA. (2004.) Assessment of post-stroke quality of life in cost-effectiveness studies: The usefulness of the Barthel Index and the EuroQoL-5D. Quality of Life Research, 13, 097-86            G codes: In compliance with CMSs Claims Based Outcome Reporting, the following G-code set was chosen for this patient based on their primary functional limitation being treated: The outcome measure chosen to determine the severity of the functional limitation was the Barthel with a score of 70/100 which was correlated with the impairment scale. · Mobility - Walking and Moving Around:               - CURRENT STATUS:    CJ - 20%-39% impaired, limited or restricted               - GOAL STATUS:           CI - 1%-19% impaired, limited or restricted               - D/C STATUS:                       ---------------To be determined---------------      Physical Therapy Evaluation Charge Determination   History Examination Presentation Decision-Making   MEDIUM  Complexity : 1-2 comorbidities / personal factors will impact the outcome/ POC  MEDIUM Complexity : 3 Standardized tests and measures addressing body structure, function, activity limitation and / or participation in recreation  MEDIUM Complexity : Evolving with changing characteristics  Other outcome measures Barthel  MEDIUM      Based on the above components, the patient evaluation is determined to be of the following complexity level: MEDIUM     Pain:  Pain Scale 1: Numeric (0 - 10)  Pain Intensity 1: 0              Activity Tolerance:   Good, close to baseline. O2 sats at 93% on 2L O2 with activity. Please refer to the flowsheet for vital signs taken during this treatment.   After treatment:   [X]         Patient left in no apparent distress sitting up in chair  [ ]         Patient left in no apparent distress in bed  [X]         Call bell left within reach  [X]         Nursing notified  [ ]         Caregiver present  [X]         Bed alarm activated      COMMUNICATION/EDUCATION:   The patients plan of care was discussed with: Registered Nurse and . [X]         Fall prevention education was provided and the patient/caregiver indicated understanding. [X]         Patient/family have participated as able in goal setting and plan of care. [X]         Patient/family agree to work toward stated goals and plan of care. [ ]         Patient understands intent and goals of therapy, but is neutral about his/her participation. [ ]         Patient is unable to participate in goal setting and plan of care.      Thank you for this referral.  Raji Velazquez, PT, DPT   Time Calculation: 19 mins

## 2017-04-10 NOTE — PROGRESS NOTES
Heart Failure Care Coordinator visited the patient in room. Provided introduction to self, and explanation of the Care Coordinator role. Patient was provided a copy of the [facility] letter. Patient made aware of contact information should any questions arise before or after discharge.

## 2017-04-10 NOTE — CARDIO/PULMONARY
CP Rehab Note: chart review/patient added to bundle list mid morning    Admit: SOB    Mhx: DM, HTN, pacemaker, hyperlipidemia, CHF, CKD, mild cognitive impairment, EF 55-60% on 8/2/16. Never smoked. New CHF teaching today. Echo pending. The CHF teaching packet and received information regarding the low sodium diet. Instruction given on s/s of CHF, checking weight every am and calling MD if weight is up 2-3 lbs in a day or 5 lbs in a week (or as directed by the physician), fluid/Na restrictions, s/s of worsening CHF and when to call MD.  Discussed the CHF \"zones\" and subsequent actions with pt. Reviewed activity as tolerated with frequent rest periods as needed, taking medications as prescribed, and the importance of follow up visits with physician. Encouraged patient to verbalize concerns/questions. Pt verbalized understanding.

## 2017-04-10 NOTE — PROGRESS NOTES
Bedside shift change report given to Karla Schneider (oncoming nurse) by Junaid Dent RN (offgoing nurse). Report included the following information SBAR. [address] SHIFT NURSING NOTE    Bedside shift change report given to (oncoming nurse) by Ashutosh Méndez RN (offgoing nurse). Report included the following information SBAR. SHIFT SUMMARY: Uneventful evening        Admission Date 4/9/2017   Admission Diagnosis Acute on chronic systolic (congestive) heart failure (HCC)  Type 2 DM with CKD stage 3 and hypertension ([facility] [address]  Edema   Consults IP CONSULT TO PRIMARY CARE PROVIDER  IP CONSULT TO PALLIATIVE CARE - PROVIDER  IP CONSULT TO NEPHROLOGY  IP CONSULT TO CARDIOLOGY        Consults   [] PT   [] OT   [] Speech   [x] Palliative      [] Hospice    [] Case Management   [] None   Cardiac Monitoring   [x] Yes   [] No     Antibiotics   [] Yes   [x] No   GI Prophylaxis  (Ex: Protonix, Pepcid, etc,.)   [] Yes   [x] No          DVT Prophylaxis   SCDs:             Brandon stockings:         [x] Medication (Ex: Lovenox, Eliquis, Brilinta, Coumadin,  Heparin, etc..)   [] Contraindicated   [] No VTE needed       Urinary Catheter             LDAs               Peripheral IV 04/09/17 Right Hand (Active)   Site Assessment Clean, dry, & intact 4/10/2017  2:04 AM   Phlebitis Assessment 0 4/10/2017  2:04 AM   Infiltration Assessment 0 4/10/2017  2:04 AM   Dressing Status Clean, dry, & intact 4/10/2017  2:04 AM   Dressing Type Transparent;Tape 4/10/2017  2:04 AM   Hub Color/Line Status Pink;Flushed 4/10/2017  2:04 AM                      I/Os   Intake/Output Summary (Last 24 hours) at 04/10/17 0532  Last data filed at 04/09/17 2128   Gross per 24 hour   Intake              730 ml   Output             1275 ml   Net             -545 ml         Activity Level Activity Level:  Up with Assistance     Activity Assistance: Partial (one person)   Diet Active Orders   Diet    DIET CARDIAC Regular; 2 GM NA (House Low NA); FR 1500ML      Purposeful Rounding every 1-2 hour? [x] Yes    Ruth Score  Total Score: 3   Bed Alarm (If score 3 or >)   [x] Yes    [] Refused (See signed refusal form in chart)   Trever Score  Trever Score: 19       Trever Score (if score 14 or less)   [] PMT consult   [] Nutrition consult   [] Wound Care consult      []  Specialty bed         Influenza Vaccine Received Flu Vaccine for Current Season (usually Sept-March): Yes               Needs prior to discharge:   Home O2 required:    [] Yes   [x] No     If yes, how much O2 required?     Other:    Last Bowel Movement Date: 04/07/17   Readmission Risk Assessment Tool Score High Risk            51       Total Score        3 Relationship with PCP    4 More than 1 Admission in calendar year    5 Patient Insurance is Medicare, Medicaid or Self Pay    39 Charlson Comorbidity Score        Criteria that do not apply:    Patient Living Status    Patient Length of Stay > 5       Expected Length of Stay - - -   Actual Length of Stay 1

## 2017-04-10 NOTE — CONSULTS
Palliative Medicine Consult  [place]: [phone] ([phone])    Patient Name: Nadia Bryant. YOB: 1925    Date of Initial Consult: 4/10/17  Reason for Consult: care decisions  Requesting Provider: Lorenso Crigler  Primary Care Physician: Luba Peck MD      SUMMARY:   Nadia Tate is a 80 y.o. with a past history of mild cognitive impairment, leg edema, DM, CKD stage 3, chronic heart failure, dual chamber pacemaker, who was admitted on 4/9/2017 from home with a diagnosis of worsening shortness of breath, edema in the setting of heavy salt diet and recent med changes. Current medical issues leading to Palliative Medicine involvement include: address advance care planning. Patient appears well, lives at home with his new wife ( 3 months ago), uses cane only outdoors. PALLIATIVE DIAGNOSES:   1. Dyspnea- improved  2. Primary palliative  3. acp       PLAN:   1. Met with patient who was sitting in chair and reading newspaper. Very nice gentleman, talks about [employer], was in [employer] for 33 years. 2. AMD- thinks he has one with his son Dayday Casey. Spoke to Abner while in room, he will check and let us know. If no mPOA, only financial he would like to complete one and name Abner. He understands that without mPOA papers the nok will be his new wife and he does not want that. He thinks she is too fragile for that. 3. Code status- we discussed this in detail along with son on the phone. Patient says he made a decision to be Dnr when his second wife was dying. He made his wishes clear, I changed the code status on the computer, left DDNR with him for Abner to read it before patient signing it. 4. Initial consult note routed to primary continuity provider  5.  Communicated plan of care with: Palliative IDT, RN       GOALS OF CARE / TREATMENT PREFERENCES:   [====Goals of Care====]  GOALS OF CARE:  Patient / health care proxy stated goals: DNR      TREATMENT PREFERENCES:   Code Status: DNR    Advance Care Planning:  Advance Care Planning 4/10/2017   Patient's Healthcare Decision Maker is: Legal Next Justin Cedeño   Primary Decision Maker Name Vladimir bean   Primary Decision Maker Phone Number [phone]   Primary Decision Maker Relationship to Patient Adult child   Confirm Advance Directive -       Other:    The palliative care team has discussed with patient / health care proxy about goals of care / treatment preferences for patient.  [====Goals of Care====]         HISTORY:         HPI/SUBJECTIVE:    The patient is:   [x] Verbal and participatory  [] Non-participatory due to: No complaints    Clinical Pain Assessment (nonverbal scale for severity on nonverbal patients):   [++++ Clinical Pain Assessment++++]  [++++Pain Severity++++]: Pain: 0  [++++Pain Character++++]:   [++++Pain Duration++++]:   [++++Pain Effect++++]:   [++++Pain Factors++++]:   [++++Pain Frequency++++]:   [++++Pain Location++++]:   [++++ Clinical Pain Assessment++++]     FUNCTIONAL ASSESSMENT:     Palliative Performance Scale (PPS):  PPS: 70       PSYCHOSOCIAL/SPIRITUAL SCREENING:     Advance Care Planning:  Advance Care Planning 4/10/2017   Patient's Healthcare Decision Maker is: Legal Next Justin Cedeño   Primary Decision Maker Name [address] [place] [address]   Primary Decision Maker Phone Number [phone]   Primary Decision Maker Relationship to Patient Adult child   Confirm Advance Directive -        Any spiritual / Scientology concerns:  [] Yes /  [] No    Caregiver Burnout:  [] Yes /  [] No /  [] No Caregiver Present      Anticipatory grief assessment:   [] Normal  / [] Maladaptive       ESAS Anxiety: Anxiety: 0    ESAS Depression: Depression: 0        REVIEW OF SYSTEMS:     Positive and pertinent negative findings in ROS are noted above in HPI. The following systems were [] reviewed / [] unable to be reviewed as noted in HPI  Other findings are noted below.   Systems: constitutional, ears/nose/mouth/throat, respiratory, gastrointestinal, genitourinary, musculoskeletal, integumentary, neurologic, psychiatric, endocrine. Positive findings noted below. Modified ESAS Completed by: provider   Fatigue: 0 Drowsiness: 0   Depression: 0 Pain: 0   Anxiety: 0 Nausea: 0   Anorexia: 0 Dyspnea: 0     Constipation: No     Stool Occurrence(s): 1        PHYSICAL EXAM:     From RN flowsheet:  Wt Readings from Last 3 Encounters:   04/09/17 140 lb 11.2 oz (63.8 kg)   03/02/17 133 lb (60.3 kg)   12/30/16 137 lb 12.6 oz (62.5 kg)     Blood pressure 100/57, pulse 87, temperature 97.2 °F (36.2 °C), resp. rate 16, height 5' 6\" (1.676 m), weight 140 lb 11.2 oz (63.8 kg), SpO2 97 %.     Pain Scale 1: Numeric (0 - 10)  Pain Intensity 1: 0                 Last bowel movement, if known:     Constitutional: alert, oriented  Eyes: pupils equal, anicteric  ENMT: no nasal discharge, moist mucous membranes  Cardiovascular: regular rhythm, distal pulses intact  Respiratory: breathing not labored, symmetric  Gastrointestinal: soft non-tender, +bowel sounds  Musculoskeletal: no deformity, no tenderness to palpation  Skin: warm, dry  Neurologic: following commands, moving all extremities  Psychiatric: full affect, no hallucinations  Other:       HISTORY:     Principal Problem:    Systolic CHF, acute ([address] (4/9/2017)    Active Problems:    HTN (hypertension) (10/28/2009)      Hyperlipidemia (10/28/2009)      Pacemaker (8/21/2013)      Overview: Dual chamber-[employer]      Edema (4/9/2017)      Type 2 DM with CKD stage 3 and hypertension ([address] (4/9/2017)      Past Medical History:   Diagnosis Date    Arrhythmia     Arthritis     Carpal tunnel syndrome 2006    CKD (chronic kidney disease) 3/2/2017    DM type 2 (diabetes mellitus, type 2) ([address] 10/28/2009    ED (erectile dysfunction) 10/28/2009    HTN (hypertension) 10/28/2009    Hyperlipidemia 10/28/2009    Neuropathy 10/28/2009    Other ill-defined conditions     hyperlipidemia    Pacemaker     [facility] hematoma) [facility]     Systolic CHF ([facility] 08/43/7898    Vertigo 2007      Past Surgical History:   Procedure Laterality Date    HX PACEMAKER      NEUROLOGICAL PROCEDURE UNLISTED  1999    evacuation SDH; At [facility]. Family History   Problem Relation Age of Onset    Stroke Mother     Pneumonia Father     MS Child     Cancer Child      breast    Hearing Impairment Child       History reviewed, no pertinent family history.   Social History   Substance Use Topics    Smoking status: Never Smoker    Smokeless tobacco: Never Used    Alcohol use No     Allergies   Allergen Reactions    Lisinopril Swelling     angioedema      Current Facility-Administered Medications   Medication Dose Route Frequency    atorvastatin (LIPITOR) tablet 20 mg  20 mg Oral DAILY    cholecalciferol (VITAMIN D3) tablet 4,000 Units  4,000 Units Oral DAILY    clotrimazole-betamethasone (LOTRISONE) 1-0.05 % cream   Topical BID    metoprolol tartrate (LOPRESSOR) tablet 12.5 mg  12.5 mg Oral BID    sodium chloride (NS) flush 5-10 mL  5-10 mL IntraVENous Q8H    sodium chloride (NS) flush 5-10 mL  5-10 mL IntraVENous PRN    acetaminophen (TYLENOL) tablet 500 mg  500 mg Oral Q4H PRN    oxyCODONE-acetaminophen (PERCOCET) 5-325 mg per tablet 1 Tab  1 Tab Oral Q4H PRN    morphine injection 1 mg  1 mg IntraVENous Q4H PRN    naloxone (NARCAN) injection 0.4 mg  0.4 mg IntraVENous PRN    ondansetron (ZOFRAN) injection 4 mg  4 mg IntraVENous Q4H PRN    bisacodyl (DULCOLAX) tablet 5 mg  5 mg Oral DAILY PRN    heparin (porcine) injection 5,000 Units  5,000 Units SubCUTAneous Q8H    furosemide (LASIX) injection 20 mg  20 mg IntraVENous BID    insulin lispro (HUMALOG) injection   SubCUTAneous AC&HS    glucose chewable tablet 16 g  4 Tab Oral PRN    dextrose (D50W) injection syrg 12.5-25 g  12.5-25 g IntraVENous PRN    glucagon (GLUCAGEN) injection 1 mg  1 mg IntraMUSCular PRN          LAB AND IMAGING FINDINGS:     Lab Results   Component Value Date/Time    WBC 7.8 04/09/2017 04:49 AM    HGB 13.1 04/09/2017 04:49 AM    PLATELET 566 26/72/7080 04:49 AM     Lab Results   Component Value Date/Time    Sodium 142 04/10/2017 02:00 AM    Potassium 3.6 04/10/2017 02:00 AM    Chloride 106 04/10/2017 02:00 AM    CO2 25 04/10/2017 02:00 AM    BUN 28 04/10/2017 02:00 AM    Creatinine 1.96 04/10/2017 02:00 AM    Calcium 8.6 04/10/2017 02:00 AM    Magnesium 2.3 04/09/2017 04:49 AM      Lab Results   Component Value Date/Time    AST (SGOT) 22 04/09/2017 04:49 AM    Alk. phosphatase 109 04/09/2017 04:49 AM    Protein, total 7.3 04/09/2017 04:49 AM    Albumin 3.5 04/09/2017 04:49 AM    Globulin 3.8 04/09/2017 04:49 AM     Lab Results   Component Value Date/Time    INR 1.1 12/30/2016 04:12 AM    Prothrombin time 10.8 12/30/2016 04:12 AM      No results found for: IRON, FE, TIBC, IBCT, PSAT, FERR   No results found for: PH, PCO2, PO2  No components found for: [place]   Lab Results   Component Value Date/Time     04/09/2017 04:49 AM    CK - MB 2.5 04/09/2017 04:49 AM                Total time: 70 mins  Counseling / coordination time:   > 50% counseling / coordination?:     Prolonged service was provided for  []30 min   []75 min in face to face time in the presence of the patient. Time Start:   Time End:   Note: this can only be billed with 87682 (initial) or 19554 (follow up). If multiple start / stop times, list each separately.

## 2017-04-10 NOTE — PROGRESS NOTES
NAME: Brandy Rose. :  1925        MRN:  [id number]        Assessment :    Plan:  --CKD stage 3 ([place])  Underlying CKD from age/HTN  Diastolic HF  Pulmonary edema  DM  HTN --Creatinine is at recent baseline (~ 2); bland urine; good response to iv lasix 20 mg bid (switch to po in the morning); still low BP          Subjective:     Chief Complaint:  Feeling better. Breathing almost back to baseline. We discussed the above. Review of Systems:    Symptom Y/N Comments  Symptom Y/N Comments   Fever/Chills    Chest Pain     Poor Appetite n   Edema n    Cough    Abdominal Pain     Sputum    Joint Pain     SOB/SENIOR    Pruritis/Rash     Nausea/vomit n   Tolerating PT/OT     Diarrhea    Tolerating Diet     Constipation    Other       Could not obtain due to:      Objective:     VITALS:   Last 24hrs VS reviewed since prior progress note. Most recent are:  Visit Vitals    /55 (BP 1 Location: Left arm, BP Patient Position: At rest)    Pulse 70    Temp 98.7 °F (37.1 °C)    Resp 16    Ht 5' 6\" (1.676 m)    Wt 63.8 kg (140 lb 11.2 oz)    SpO2 94%    BMI 22.71 kg/m2       Intake/Output Summary (Last 24 hours) at 04/10/17 5493  Last data filed at 17 2128   Gross per 24 hour   Intake              730 ml   Output             1275 ml   Net             -545 ml      Telemetry Reviewed:     PHYSICAL EXAM:  General: WD, WN. Alert, cooperative, no acute distress  Resp:  CTA Bilaterally. No Wheezing/Rhonchi/Rales. No access. muscle use  CV:  Regular  rhythm,  No murmur (), No Rubs, No Gallops.  Trace ankle edema  GI:  Soft, Non distended, Non tender.  +Bowel sounds, no HSM      Lab Data Reviewed: (see below)    Medications Reviewed: (see below)    PMH/SH reviewed - no change compared to H&P  ________________________________________________________________________  Care Plan discussed with:  Patient     Family      RN Care Manager                    Consultant:          Comments   >50% of visit spent in counseling and coordination of care       ________________________________________________________________________  Megha Chang MD     Procedures: see electronic medical records for all procedures/Xrays and details which  were not copied into this note but were reviewed prior to creation of Plan. LABS:  Recent Labs      04/09/17 0449   WBC  7.8   HGB  13.1   HCT  39.5   PLT  173     Recent Labs      04/10/17   0200  04/09/17   1438  04/09/17 0449   NA  142  140  141   K  3.6  3.8  3.8   CL  106  105  107   CO2  25  22  23   BUN  28*  27*  27*   CREA  1.96*  2.05*  1.91*   GLU  68  151*  84   CA  8.6  8.5  8.8   MG   --    --   2.3     Recent Labs      04/09/17 0449   SGOT  22   AP  109   TP  7.3   ALB  3.5   GLOB  3.8     No results for input(s): INR, PTP, APTT in the last 72 hours. No lab exists for component: INREXT   No results for input(s): FE, TIBC, PSAT, FERR in the last 72 hours. Lab Results   Component Value Date/Time    Folate 12.3 06/02/2015 09:07 AM      No results for input(s): PH, PCO2, PO2 in the last 72 hours.   Recent Labs      04/09/17 0449   CPK  114   CKMB  2.5     No components found for: [place]  Lab Results   Component Value Date/Time    Color YELLOW/STRAW 04/09/2017 08:44 AM    Appearance CLEAR 04/09/2017 08:44 AM    Specific gravity 1.011 04/09/2017 08:44 AM    pH (UA) 5.5 04/09/2017 08:44 AM    Protein NEGATIVE  04/09/2017 08:44 AM    Glucose NEGATIVE  04/09/2017 08:44 AM    Ketone NEGATIVE  04/09/2017 08:44 AM    Bilirubin NEGATIVE  04/09/2017 08:44 AM    Urobilinogen 1.0 04/09/2017 08:44 AM    Nitrites NEGATIVE  04/09/2017 08:44 AM    Leukocyte Esterase NEGATIVE  04/09/2017 08:44 AM    Epithelial cells FEW 04/09/2017 08:44 AM    Bacteria NEGATIVE  04/09/2017 08:44 AM    WBC 0-4 04/09/2017 08:44 AM    RBC 0-5 04/09/2017 08:44 AM       MEDICATIONS:  Current Facility-Administered Medications   Medication Dose Route Frequency    atorvastatin (LIPITOR) tablet 20 mg  20 mg Oral DAILY    cholecalciferol (VITAMIN D3) tablet 4,000 Units  4,000 Units Oral DAILY    clotrimazole-betamethasone (LOTRISONE) 1-0.05 % cream   Topical BID    metoprolol tartrate (LOPRESSOR) tablet 12.5 mg  12.5 mg Oral BID    sodium chloride (NS) flush 5-10 mL  5-10 mL IntraVENous Q8H    sodium chloride (NS) flush 5-10 mL  5-10 mL IntraVENous PRN    acetaminophen (TYLENOL) tablet 500 mg  500 mg Oral Q4H PRN    oxyCODONE-acetaminophen (PERCOCET) 5-325 mg per tablet 1 Tab  1 Tab Oral Q4H PRN    morphine injection 1 mg  1 mg IntraVENous Q4H PRN    naloxone (NARCAN) injection 0.4 mg  0.4 mg IntraVENous PRN    ondansetron (ZOFRAN) injection 4 mg  4 mg IntraVENous Q4H PRN    bisacodyl (DULCOLAX) tablet 5 mg  5 mg Oral DAILY PRN    heparin (porcine) injection 5,000 Units  5,000 Units SubCUTAneous Q8H    furosemide (LASIX) injection 20 mg  20 mg IntraVENous BID    insulin lispro (HUMALOG) injection   SubCUTAneous AC&HS    glucose chewable tablet 16 g  4 Tab Oral PRN    dextrose (D50W) injection syrg 12.5-25 g  12.5-25 g IntraVENous PRN    glucagon (GLUCAGEN) injection 1 mg  1 mg IntraMUSCular PRN

## 2017-04-11 NOTE — DIABETES MGMT
DTC Progress Note    Recommendations/ Comments: If appropriate, please consider:  1) decreasing correctional insulin to high sensitivity. 2) adding D5% IV fluids to address hypoglycemia with poor po intakes. Chart reviewed on [facility] Level. BG <80 mg/dl. Patient is a 80 y.o. male with known DM complicated by CKD3 on no DM meds at home. A1c:   Lab Results   Component Value Date/Time    Hemoglobin A1c 5.6 11/03/2016 10:32 AM    Hemoglobin A1c 5.9 05/31/2016 08:44 AM       Recent Glucose Results:   Lab Results   Component Value Date/Time    GLU 85 04/11/2017 02:29 AM    GLUCPOC 158 (H) 04/11/2017 10:31 AM    GLUCPOC 108 (H) 04/11/2017 08:17 AM    GLUCPOC 62 (L) 04/11/2017 07:59 AM        Lab Results   Component Value Date/Time    Creatinine 2.33 04/11/2017 02:29 AM       Active Orders   Diet    DIET CARDIAC Regular; 2 GM NA (House Low NA); FR 1500ML        PO intake:   Patient Vitals for the past 72 hrs:   % Diet Eaten   04/11/17 0942 50 %   04/10/17 1304 100 %       Current hospital DM medication: Lispro correctional insulin - normal sensitivity. Will continue to follow as needed.     Thank you  Jesse Whitaker RD CDE

## 2017-04-11 NOTE — PROGRESS NOTES
Care Management:    He is a bundle patient. Admitted with CHF and is being diuresed . He has had a  Echo and ultrasound of legs. He has a hx of DM with neuropathy, pacemaker. He is active with his PCP and his cardiologist is Dr Evelyn Titus.    Palliative and cardiology has been consulted. He uses [facility] on [address] He lives with his wife and is independent with ADLs. He still drives. Care Management Interventions  PCP Verified by CM: Yes  Palliative Care Consult (Criteria: CHF and RRAT>21): Yes  Mode of Transport at Discharge: Other (see comment) (family)  Physical Therapy Consult: Yes  Occupational Therapy Consult: Yes  Current Support Network: Lives with Spouse (Lives with his wife and is independent with ADLs including drving.  He has a cane. )  Confirm Follow Up Transport: Family  Plan discussed with Pt/Family/Caregiver: Yes (met with patient. )

## 2017-04-11 NOTE — PROGRESS NOTES
Problem: Mobility Impaired (Adult and Pediatric)  Goal: *Acute Goals and Plan of Care (Insert Text)  Physical Therapy Goals  Initiated 4/10/2017  1. Patient will move from supine to sit and sit to supine , scoot up and down and roll side to side in bed with independence within 7 day(s). 2. Patient will transfer from bed to chair and chair to bed with independence using the least restrictive device within 7 day(s). 3. Patient will perform sit to stand with independence within 7 day(s). 4. Patient will ambulate with modified independence for 150 feet with the least restrictive device within 7 day(s). 5. Patient will ascend/descend 12 stairs with one sided handrail(s) with modified independence within 7 day(s). PHYSICAL THERAPY TREATMENT  Patient: Omi Knapp (80 y.o. male)  Date: 4/11/2017  Diagnosis: Acute on chronic systolic (congestive) heart failure (HCC)  Type 2 DM with CKD stage 3 and hypertension (HCC)  Edema Systolic CHF, acute (HCC)       Precautions: Bed Alarm, DNR      ASSESSMENT:  Patient continues to do quite well with mobility. Independent with bed mobility. Patient ambulated 200' with straight cane ane and CGA, steady with no LOB. Patient also ambulated up and down 13 steps with one handrail and CGA. Patient up in chair after session. O2 sats on room air: at rest94%, HR 71; after 100' of ambulation 91%, ; after steps and 200' 95%, . No further PT recommended at discharge as patient likely at baseline. Progression toward goals:  [X]    Improving appropriately and progressing toward goals  [ ]    Improving slowly and progressing toward goals  [ ]    Not making progress toward goals and plan of care will be adjusted       PLAN:  Patient continues to benefit from skilled intervention to address the above impairments. Continue treatment per established plan of care.   Discharge Recommendations:  None  Further Equipment Recommendations for Discharge:  none       SUBJECTIVE: Patient stated yes, i'll get up and walk.       OBJECTIVE DATA SUMMARY:   Critical Behavior:  Neurologic State: Alert  Orientation Level: Appropriate for age, Oriented X4  Cognition: Appropriate decision making, Follows commands  Safety/Judgement: Awareness of environment, Fall prevention  Functional Mobility Training:  Bed Mobility:  Rolling: Independent        Scooting: Independent        Transfers:  Sit to Stand: Independent  Stand to Sit: Independent        Bed to Chair: Independent                    Balance:  Sitting: Intact  Standing: Intact; With support  Standing - Static: Good  Standing - Dynamic : Good (with cane)  Ambulation/Gait Training:  Distance (ft): 200 Feet (ft)  Assistive Device: Gait belt;Cane, straight  Ambulation - Level of Assistance: Contact guard assistance        Gait Abnormalities: Decreased step clearance              Speed/Clover: Pace decreased (<100 feet/min)  Step Length: Left shortened;Right shortened                                      Pain:  Pain Scale 1: Numeric (0 - 10)  Pain Intensity 1: 0              Activity Tolerance:   good  Please refer to the flowsheet for vital signs taken during this treatment.   After treatment:   [X]    Patient left in no apparent distress sitting up in chair  [ ]    Patient left in no apparent distress in bed  [X]    Call bell left within reach  [X]    Nursing notified  [ ]    Caregiver present  [ ]    Bed alarm activated      COMMUNICATION/COLLABORATION:   The patients plan of care was discussed with: Registered Nurse     Severo Carolin, PT   Time Calculation: 14 mins

## 2017-04-11 NOTE — CARDIO/PULMONARY
CP Rehab Note: chart review/patient added to bundle list mid morning     Admit: SOB     Mhx: DM, HTN, pacemaker, hyperlipidemia, CHF, CKD, mild cognitive impairment, EF 50-55% on 4/10/17.     Never smoked.     Follow up visit/teaching given on s/s of CHF, checking weight every am and calling MD if weight is up 2-3 lbs in a day or 5 lbs in a week (or as directed by the physician), fluid/Na restrictions, s/s of worsening CHF and when to call MD. Discussed the CHF \"zones\" and subsequent actions with pt. Reviewed activity as tolerated with frequent rest periods as needed, taking medications as prescribed, and the importance of follow up visits with physician.      Encouraged patient to verbalize concerns/questions. Pt verbalized understanding.

## 2017-04-11 NOTE — PROGRESS NOTES
General Daily Progress Note    Admit Date: 4/9/2017  Hospital day 4    Subjective:     Patient has no complaint of shortness of breath. No chest tightness. didnt have oxygen at home prior to admission. ..   Medication side effects: none    Current Facility-Administered Medications   Medication Dose Route Frequency    furosemide (LASIX) tablet 20 mg  20 mg Oral BID    atorvastatin (LIPITOR) tablet 20 mg  20 mg Oral DAILY    cholecalciferol (VITAMIN D3) tablet 4,000 Units  4,000 Units Oral DAILY    clotrimazole-betamethasone (LOTRISONE) 1-0.05 % cream   Topical BID    metoprolol tartrate (LOPRESSOR) tablet 12.5 mg  12.5 mg Oral BID    sodium chloride (NS) flush 5-10 mL  5-10 mL IntraVENous Q8H    sodium chloride (NS) flush 5-10 mL  5-10 mL IntraVENous PRN    acetaminophen (TYLENOL) tablet 500 mg  500 mg Oral Q4H PRN    oxyCODONE-acetaminophen (PERCOCET) 5-325 mg per tablet 1 Tab  1 Tab Oral Q4H PRN    morphine injection 1 mg  1 mg IntraVENous Q4H PRN    naloxone (NARCAN) injection 0.4 mg  0.4 mg IntraVENous PRN    ondansetron (ZOFRAN) injection 4 mg  4 mg IntraVENous Q4H PRN    bisacodyl (DULCOLAX) tablet 5 mg  5 mg Oral DAILY PRN    heparin (porcine) injection 5,000 Units  5,000 Units SubCUTAneous Q8H    insulin lispro (HUMALOG) injection   SubCUTAneous AC&HS    glucose chewable tablet 16 g  4 Tab Oral PRN    dextrose (D50W) injection syrg 12.5-25 g  12.5-25 g IntraVENous PRN    glucagon (GLUCAGEN) injection 1 mg  1 mg IntraMUSCular PRN        Review of Systems  Pertinent items are noted in HPI.     Objective:     Patient Vitals for the past 8 hrs:   BP Temp Pulse Resp SpO2 Weight   04/11/17 0718 - - - - - 133 lb 9.6 oz (60.6 kg)   04/11/17 0225 97/65 98 °F (36.7 °C) 79 20 96 % -        04/09 1901 - 04/11 0700  In: 1050 [P.O.:1050]  Out: 1075 [Urine:1075]    Physical Exam:   Visit Vitals    BP 97/65    Pulse 79    Temp 98 °F (36.7 °C)    Resp 20    Ht 5' 6\" (1.676 m)    Wt 133 lb 9.6 oz (60.6 kg)    SpO2 96%    BMI 21.56 kg/m2     Lungs: clear to auscultation bilaterally  Heart: regular rate and rhythm, S1, S2 normal, no murmur, click, rub or gallop  Abdomen: soft, non-tender.  Bowel sounds normal. No masses,  no organomegaly  Extremities: edema , tr        ECG: normal sinus rhythm     Data Review   Recent Results (from the past 24 hour(s))   GLUCOSE, POC    Collection Time: 04/10/17  8:02 AM   Result Value Ref Range    Glucose (POC) 70 65 - 100 mg/dL    Performed by [facility]    GLUCOSE, POC    Collection Time: 04/10/17  9:34 AM   Result Value Ref Range    Glucose (POC) 78 65 - 100 mg/dL    Performed by [facility]    GLUCOSE, POC    Collection Time: 04/10/17 10:00 AM   Result Value Ref Range    Glucose (POC) 115 (H) 65 - 100 mg/dL    Performed by [facility]    T4, FREE    Collection Time: 04/10/17 11:35 AM   Result Value Ref Range    T4, Free 1.8 (H) 0.8 - 1.5 NG/DL   TSH 3RD GENERATION    Collection Time: 04/10/17 11:35 AM   Result Value Ref Range    TSH 0.96 0.36 - 3.74 uIU/mL   GLUCOSE, POC    Collection Time: 04/10/17 11:51 AM   Result Value Ref Range    Glucose (POC) 101 (H) 65 - 100 mg/dL    Performed by [facility]    GLUCOSE, POC    Collection Time: 04/10/17  4:49 PM   Result Value Ref Range    Glucose (POC) 72 65 - 100 mg/dL    Performed by [address] [place] [address], POC    Collection Time: 04/10/17  5:10 PM   Result Value Ref Range    Glucose (POC) 92 65 - 100 mg/dL    Performed by [address] [place] [address], POC    Collection Time: 04/10/17  9:10 PM   Result Value Ref Range    Glucose (POC) 111 (H) 65 - 100 mg/dL    Performed by [facility], BASIC    Collection Time: 04/11/17  2:29 AM   Result Value Ref Range    Sodium 136 136 - 145 mmol/L    Potassium 3.9 3.5 - 5.1 mmol/L    Chloride 100 97 - 108 mmol/L    CO2 27 21 - 32 mmol/L    Anion gap 9 5 - 15 mmol/L    Glucose 85 65 - 100 mg/dL    BUN 33 (H) 6 - 20 MG/DL    Creatinine 2.33 (H) 0.70 - 1.30 MG/DL BUN/Creatinine ratio 14 12 - 20      GFR est AA 32 (L) >60 ml/min/1.73m2    GFR est non-AA 26 (L) >60 ml/min/1.73m2    Calcium 8.7 8.5 - 10.1 MG/DL   CBC W/O DIFF    Collection Time: 04/11/17  2:29 AM   Result Value Ref Range    WBC 6.6 4.1 - 11.1 K/uL    RBC 4.64 4.10 - 5.70 M/uL    HGB 12.4 12.1 - 17.0 g/dL    HCT 37.9 36.6 - 50.3 %    MCV 81.7 80.0 - 99.0 FL    MCH 26.7 26.0 - 34.0 PG    MCHC 32.7 30.0 - 36.5 g/dL    RDW 15.1 (H) 11.5 - 14.5 %    PLATELET 915 571 - 431 K/uL   MAGNESIUM    Collection Time: 04/11/17  2:29 AM   Result Value Ref Range    Magnesium 2.0 1.6 - 2.4 mg/dL           Assessment:     Principal Problem:    Systolic CHF, acute ([address] (4/9/2017)    Active Problems:    HTN (hypertension) (10/28/2009)      Hyperlipidemia (10/28/2009)      Pacemaker (8/21/2013)      Overview: Dual chamber-[employer]      Edema (4/9/2017)      Type 2 DM with CKD stage 3 and hypertension ([facility] (4/9/2017)      Shortness of breath (4/10/2017)      ACP (advance care planning) (4/10/2017)      Goals of care, counseling/discussion (4/10/2017)        Plan:     1. Diastolic heart failure- contineu Lasix. Symptomatically better. Note that has lost 8 lbs, 141 to 133 since admission. 2. Chronic renal failure  3. Edema legs, right greater than L- Ultrasound negative for DVT  4. Pulmonary hypertension on echo 8-2016. VQ scan yesterday- low probablility for PE. Will do overnight pulse ox study tonight to determine need for home O2.   5. Pacemaker  6. Sinus tachycardia- better. 7. Mild hypoxemia- will do overnight pulse ox study prior tonight. He didn't  have home oxygen on admission. 8. Disposition- will dc oxygen, try to get to walk in childs,, DC home tomorrow if stable.

## 2017-04-11 NOTE — PROGRESS NOTES
NAME: Warden Gutierrez :  1925        MRN:  [id number]        Assessment :    Plan:  --CKD stage 3 ([place])  Underlying CKD from age/HTN  Diastolic HF  Pulmonary edema  DM  HTN --Creatinine is up a tad today (2.3); bland urine; good response to iv lasix; switch to po lasix today; SBP 90's to 100's     Inaccurate I&O and no daily weights       Subjective:     Chief Complaint:  Feeling better. Breathing is back to baseline. Comfortable, lying flat in bed without oxygen. We discussed the above. Review of Systems:    Symptom Y/N Comments  Symptom Y/N Comments   Fever/Chills    Chest Pain     Poor Appetite n   Edema n    Cough    Abdominal Pain     Sputum    Joint Pain     SOB/SENIOR    Pruritis/Rash     Nausea/vomit n   Tolerating PT/OT     Diarrhea    Tolerating Diet     Constipation    Other       Could not obtain due to:      Objective:     VITALS:   Last 24hrs VS reviewed since prior progress note. Most recent are:  Visit Vitals    BP 97/65    Pulse 79    Temp 98 °F (36.7 °C)    Resp 20    Ht 5' 6\" (1.676 m)    Wt 63.8 kg (140 lb 11.2 oz)    SpO2 96%    BMI 22.71 kg/m2       Intake/Output Summary (Last 24 hours) at 17 0640  Last data filed at 04/10/17 2321   Gross per 24 hour   Intake              800 ml   Output              725 ml   Net               75 ml      Telemetry Reviewed:     PHYSICAL EXAM:  General: WD, WN. Alert, cooperative, no acute distress  Resp:  CTA Bilaterally. No Wheezing/Rhonchi/Rales. No access. muscle use  CV:  Regular  rhythm,  No murmur (), No Rubs, No Gallops.  Trace ankle edema  GI:  Soft, Non distended, Non tender.  +Bowel sounds, no HSM      Lab Data Reviewed: (see below)    Medications Reviewed: (see below)    PMH/SH reviewed - no change compared to H&P  ________________________________________________________________________  Care Plan discussed with:  Patient     Family RN     Care Manager                    Consultant:          Comments   >50% of visit spent in counseling and coordination of care       ________________________________________________________________________  Wenceslao Moreland MD     Procedures: see electronic medical records for all procedures/Xrays and details which  were not copied into this note but were reviewed prior to creation of Plan. LABS:  Recent Labs      04/11/17 0229 04/09/17 0449   WBC  6.6  7.8   HGB  12.4  13.1   HCT  37.9  39.5   PLT  163  173     Recent Labs      04/11/17   0229  04/10/17   0200  04/09/17   1438  04/09/17 0449   NA  136  142  140  141   K  3.9  3.6  3.8  3.8   CL  100  106  105  107   CO2  27  25  22  23   BUN  33*  28*  27*  27*   CREA  2.33*  1.96*  2.05*  1.91*   GLU  85  68  151*  84   CA  8.7  8.6  8.5  8.8   MG  2.0   --    --   2.3     Recent Labs      04/09/17 0449   SGOT  22   AP  109   TP  7.3   ALB  3.5   GLOB  3.8     No results for input(s): INR, PTP, APTT in the last 72 hours. No lab exists for component: INREXT, INREXT   No results for input(s): FE, TIBC, PSAT, FERR in the last 72 hours. Lab Results   Component Value Date/Time    Folate 12.3 06/02/2015 09:07 AM      No results for input(s): PH, PCO2, PO2 in the last 72 hours.   Recent Labs      04/09/17 0449   CPK  114   CKMB  2.5     No components found for: [place]  Lab Results   Component Value Date/Time    Color YELLOW/STRAW 04/09/2017 08:44 AM    Appearance CLEAR 04/09/2017 08:44 AM    Specific gravity 1.011 04/09/2017 08:44 AM    pH (UA) 5.5 04/09/2017 08:44 AM    Protein NEGATIVE  04/09/2017 08:44 AM    Glucose NEGATIVE  04/09/2017 08:44 AM    Ketone NEGATIVE  04/09/2017 08:44 AM    Bilirubin NEGATIVE  04/09/2017 08:44 AM    Urobilinogen 1.0 04/09/2017 08:44 AM    Nitrites NEGATIVE  04/09/2017 08:44 AM    Leukocyte Esterase NEGATIVE  04/09/2017 08:44 AM    Epithelial cells FEW 04/09/2017 08:44 AM    Bacteria NEGATIVE  04/09/2017 08:44 AM WBC 0-4 04/09/2017 08:44 AM    RBC 0-5 04/09/2017 08:44 AM       MEDICATIONS:  Current Facility-Administered Medications   Medication Dose Route Frequency    furosemide (LASIX) tablet 20 mg  20 mg Oral BID    atorvastatin (LIPITOR) tablet 20 mg  20 mg Oral DAILY    cholecalciferol (VITAMIN D3) tablet 4,000 Units  4,000 Units Oral DAILY    clotrimazole-betamethasone (LOTRISONE) 1-0.05 % cream   Topical BID    metoprolol tartrate (LOPRESSOR) tablet 12.5 mg  12.5 mg Oral BID    sodium chloride (NS) flush 5-10 mL  5-10 mL IntraVENous Q8H    sodium chloride (NS) flush 5-10 mL  5-10 mL IntraVENous PRN    acetaminophen (TYLENOL) tablet 500 mg  500 mg Oral Q4H PRN    oxyCODONE-acetaminophen (PERCOCET) 5-325 mg per tablet 1 Tab  1 Tab Oral Q4H PRN    morphine injection 1 mg  1 mg IntraVENous Q4H PRN    naloxone (NARCAN) injection 0.4 mg  0.4 mg IntraVENous PRN    ondansetron (ZOFRAN) injection 4 mg  4 mg IntraVENous Q4H PRN    bisacodyl (DULCOLAX) tablet 5 mg  5 mg Oral DAILY PRN    heparin (porcine) injection 5,000 Units  5,000 Units SubCUTAneous Q8H    insulin lispro (HUMALOG) injection   SubCUTAneous AC&HS    glucose chewable tablet 16 g  4 Tab Oral PRN    dextrose (D50W) injection syrg 12.5-25 g  12.5-25 g IntraVENous PRN    glucagon (GLUCAGEN) injection 1 mg  1 mg IntraMUSCular PRN

## 2017-04-11 NOTE — PROGRESS NOTES
Occupational Therapy EVALUATION/discharge  Patient: Fabiola Pineda (80 y.o. male)  Date: 4/11/2017  Primary Diagnosis: Acute on chronic systolic (congestive) heart failure (HCC)  Type 2 DM with CKD stage 3 and hypertension (HCC)  Edema        Precautions:   Bed Alarm, DNR    ASSESSMENT:   Based on the objective data described below, the patient presents with generalized weakness, decreased endurance, strength , SOB and ADLs. Pt lives with wife in tri level home and was independent in all areas prior and using a SPC. Pt was sitting up on EOB when OT arrived, eating breakfast.  OT tried pt on RA during his tx and pt was 96% at rest and as he was walking in room and performing transfers to toilet , chair and bed, his O2% only dropped to 93%. Pt was supervision for transfers and states that he is at his baseline with ADLs now that he is breathing better. Pt has no OT needs and recommend that pt return home with wife and no further therapy needed at discharge. Further skilled acute occupational therapy is not indicated at this time. Discharge Recommendations: None  Further Equipment Recommendations for Discharge: none      SUBJECTIVE:   Patient stated I feel fine now that I can breathe. Jesusita Metz    OBJECTIVE DATA SUMMARY:   HISTORY:   Past Medical History:   Diagnosis Date    Arrhythmia     Arthritis     Carpal tunnel syndrome 2006    CKD (chronic kidney disease) 3/2/2017    DM type 2 (diabetes mellitus, type 2) ([address] 10/28/2009    ED (erectile dysfunction) 10/28/2009    HTN (hypertension) 10/28/2009    Hyperlipidemia 10/28/2009    Neuropathy 10/28/2009    Other ill-defined conditions     hyperlipidemia    Pacemaker     SDH (subdural hematoma) ([address]     Systolic CHF ([address] 00/29/8540    Vertigo 2007     Past Surgical History:   Procedure Laterality Date    HX PACEMAKER      NEUROLOGICAL PROCEDURE UNLISTED  1999    evacuation SDH; At [facility].          Prior Level of Function/Home Situation: pt lives with family and was independent in all areas prior and walking with a SPC in the home. Expanded or extensive additional review of patient history:     Home Situation  Home Environment: Private residence  # Steps to Enter: 0  Rails to Enter: Yes  Hand Rails : Right  One/Two Story Residence: Other (Comment) (tri level)  # of Interior Steps: 15  Interior Rails: Right  Living Alone: No  Support Systems: Family member(s), Spouse/Significant Other/Partner  Patient Expects to be Discharged to[de-identified] Private residence  Current DME Used/Available at Home: Cane, straight  Tub or Shower Type: Tub/Shower combination  [x]  Right hand dominant   []  Left hand dominant    EXAMINATION OF PERFORMANCE DEFICITS:  Cognitive/Behavioral Status:  Neurologic State: Alert  Orientation Level: Appropriate for age;Oriented X4  Cognition: Appropriate decision making; Follows commands  Perception: Appears intact  Perseveration: No perseveration noted  Safety/Judgement: Awareness of environment; Fall prevention    Skin: in good health     Edema: none noted    Hearing: Auditory  Auditory Impairment: Hard of hearing, bilateral    Vision/Perceptual:                    intact                 Range of Motion:    AROM: Within functional limits  PROM: Within functional limits                      Strength:    Strength: Generally decreased, functional                Coordination:  Coordination: Within functional limits  Fine Motor Skills-Upper: Left Intact; Right Intact    Gross Motor Skills-Upper: Left Intact; Right Intact    Tone & Sensation:    Tone: Normal  Sensation: Intact                      Balance:  Sitting: Intact  Standing: Intact; With support  Standing - Static: Good;Constant support  Standing - Dynamic : Good    Functional Mobility and Transfers for ADLs:         Transfers:  Sit to Stand: Supervision  Stand to Sit: Supervision  Bed to Chair: Supervision  Toilet Transfer : Supervision    ADL Assessment:  Feeding: Independent    Oral Facial Hygiene/Grooming: Setup    Bathing: Setup    Upper Body Dressing: Setup    Lower Body Dressing: Setup    Toileting: Supervision             Barthel Index:    Bathin  Bladder: 10  Bowels: 10  Groomin  Dressing: 10  Feeding: 10  Mobility: 10  Stairs: 0  Toilet Use: 10  Transfer (Bed to Chair and Back): 10  Total: 80       Barthel and G-code impairment scale:  Percentage of impairment CH  0% CI  1-19% CJ  20-39% CK  40-59% CL  60-79% CM  80-99% CN  100%   Barthel Score 0-100 100 99-80 79-60 59-40 20-39 1-19   0   Barthel Score 0-20 20 17-19 13-16 9-12 5-8 1-4 0      The Barthel ADL Index: Guidelines  1. The index should be used as a record of what a patient does, not as a record of what a patient could do. 2. The main aim is to establish degree of independence from any help, physical or verbal, however minor and for whatever reason. 3. The need for supervision renders the patient not independent. 4. A patient's performance should be established using the best available evidence. Asking the patient, friends/relatives and nurses are the usual sources, but direct observation and common sense are also important. However direct testing is not needed. 5. Usually the patient's performance over the preceding 24-48 hours is important, but occasionally longer periods will be relevant. 6. Middle categories imply that the patient supplies over 50 per cent of the effort. 7. Use of aids to be independent is allowed. Aundrea Villatoro., Barthel, D.W. (1877). Functional evaluation: the Barthel Index. [facility] (14)2. SAIGE Mckeon, Akin Silver., Anastacia Brandon., Last, [address] [place] [address] (). Measuring the change indisability after inpatient rehabilitation; comparison of the responsiveness of the Barthel Index and Functional Graves Measure. Journal of Neurology, Neurosurgery, and Psychiatry, 66(4), 783-112.   ROQUE Grey.FAVIOLA, ALICIA Ya, & Oswald Cordova MDaveA. (2004.) Assessment of post-stroke quality of life in cost-effectiveness studies: The usefulness of the Barthel Index and the EuroQoL-5D. Quality of Life Research, 13, 188-65        Cognitive Retraining  Safety/Judgement: Awareness of environment; Fall prevention         Functional Measure:      G codes: In compliance with CMSs Claims Based Outcome Reporting, the following G-code set was chosen for this patient based on their primary functional limitation being treated: The outcome measure chosen to determine the severity of the functional limitation was the Barthel with a score of 80/100 which was correlated with the impairment scale. ? Self Care:     - CURRENT STATUS: CI - 1%-19% impaired, limited or restricted    - GOAL STATUS: CI - 1%-19% impaired, limited or restricted    - D/C STATUS:  CI - 1%-19% impaired, limited or restricted     Occupational Therapy Evaluation Charge Determination   History Examination Decision-Making   MEDIUM Complexity : Expanded review of history including physical, cognitive and psychosocial  history  LOW Complexity : 1-3 performance deficits relating to physical, cognitive , or psychosocial skils that result in activity limitations and / or participation restrictions  LOW Complexity : No comorbidities that affect functional and no verbal or physical assistance needed to complete eval tasks       Based on the above components, the patient evaluation is determined to be of the following complexity level: LOW   Pain:  Pain Scale 1: Numeric (0 - 10)  Pain Intensity 1: 0              Activity Tolerance:   vss  Please refer to the flowsheet for vital signs taken during this treatment.   After treatment:   [x]  Patient left in no apparent distress sitting up in chair  []  Patient left in no apparent distress in bed  [x]  Call bell left within reach  [x]  Nursing notified  []  Caregiver present  []  Bed alarm activated    COMMUNICATION/EDUCATION:   Communication/Collaboration:  [x]      Home safety education was provided and the patient/caregiver indicated understanding. [x]      Patient/family have participated as able and agree with findings and recommendations. []      Patient is unable to participate in plan of care at this time.   Findings and recommendations were discussed with: Physical Therapist and Registered Nurse    Ezio Claros OT  Time Calculation: 23 mins

## 2017-04-12 NOTE — TELEPHONE ENCOUNTER
Pt discharging from ED [facility] 4-12-17. Needs to be seen in one week.   Please call to schedule

## 2017-04-12 NOTE — ROUTINE PROCESS
Patient to be discharged. Dr. Judie Yang to come see patient. Patient sitting up in chair. Small amount of edema right leg. Lung sounds clear.    10:44 am Dr. Judie Yang came in to see patient and adjusted lasix dose for discharge

## 2017-04-12 NOTE — PROGRESS NOTES
Report received from [place] , [facility]. SBAR were discussed.     Argenis Fernandes RN

## 2017-04-12 NOTE — TELEPHONE ENCOUNTER
Warner Martinez with [facility] called requesting an order for a hospital to home one time visit.  Ms. Cooper Rivers best contact number is [phone]        Message received & copied from [facility]

## 2017-04-12 NOTE — CARDIO/PULMONARY
CP Rehab Note: chart review/patient is on the bundle list.      Admit: SOB      Mhx: DM, HTN, pacemaker, hyperlipidemia, CHF, CKD, mild cognitive impairment, EF 50-55% on 4/10/17. Met with pt who was sitting up on side of bed family at his side. Pt was able to verbalize weight parameters. He states, \"I am going to purchase a scale once I am discharged\". Reminded pt of importance of adhering to a low NA diet. Pt remarked,\" I don't use any salt\". Advised pt that most of the salt we consume comes form the food we eat. Encouraged pt to read food labels and keep NA intake to 1500 mg per day.     Pt and family without questions but reinforcement recommended.

## 2017-04-12 NOTE — DISCHARGE INSTRUCTIONS
PATIENT DISCHARGE INSTRUCTIONS      PATIENT DISCHARGE INSTRUCTIONS    Easton Mitchell. / [id number] : 1925    Admitted 2017 Discharged: 2017     MyChart Activation    Thank you for requesting access to [address] Please follow the instructions below to securely access and download your online medical record. [employer] allows you to send messages to your doctor, view your test results, renew your prescriptions, schedule appointments, and more. How Do I Sign Up? 1. In your internet browser, go to [email or web address]  2. Click on the First Time User? Click Here link in the Sign In box. You will be redirect to the New Member Sign Up page. 3. Enter your [employer] Access Code exactly as it appears below. You will not need to use this code after youve completed the sign-up process. If you do not sign up before the expiration date, you must request a new code. [employer] Access Code: [email or web address]  Expires: 2017 10:07 AM (This is the date your [employer] access code will )    4. Enter the last four digits of your Social Security Number (xxxx) and Date of Birth (mm/dd/yyyy) as indicated and click Submit. You will be taken to the next sign-up page. 5. Create a [employer] ID. This will be your [employer] login ID and cannot be changed, so think of one that is secure and easy to remember. 6. Create a [employer] password. You can change your password at any time. 7. Enter your Password Reset Question and Answer. This can be used at a later time if you forget your password. 8. Enter your e-mail address. You will receive e-mail notification when new information is available in [address] 9. Click Sign Up. You can now view and download portions of your medical record. 10. Click the Download Summary menu link to download a portable copy of your medical information.     Additional Information    If you have questions, please visit the Frequently Asked Questions section of the [employer] website at [email or web address] Remember, MyChart is NOT to be used for urgent needs. For medical emergencies, dial 911. · It is important that you take the medication exactly as they are prescribed. · Keep your medication in the bottles provided by the pharmacist and keep a list of the medication names, dosages, and times to be taken in your wallet. · Do not take other medications without consulting your doctor. What to do at Home    Recommended Diet: Regular Diet    Recommended Activity: Activity as tolerated    If you experience any of the following symptoms increasing shortness of breath, please follow up with Dr. Lula Broussard.         Signed By: Halima Alfaro MD     April 12, 2017

## 2017-04-12 NOTE — PROGRESS NOTES
Pharmacist Discharge Medication Reconciliation    Significant PMH:   Past Medical History:   Diagnosis Date    Arrhythmia     Arthritis     Carpal tunnel syndrome 2006    CKD (chronic kidney disease) 3/2/2017    DM type 2 (diabetes mellitus, type 2) ([facility] 10/28/2009    ED (erectile dysfunction) 10/28/2009    HTN (hypertension) 10/28/2009    Hyperlipidemia 10/28/2009    Neuropathy 10/28/2009    Other ill-defined conditions     hyperlipidemia    Pacemaker     SD[facility]oma) [facility]     Systolic CHF ([facility] 74/76/1469    Vertigo 2007     Chief Complaint for this Admission:   Chief Complaint   Patient presents with    Shortness of Breath     Allergies: Lisinopril    Discharge Medications:   Current Discharge Medication List        START taking these medications    Details   furosemide (LASIX) 40 mg tablet 2 tabs every am  Qty: 60 Tab, Refills: 12           CONTINUE these medications which have NOT CHANGED    Details   cholecalciferol, vitamin D3, (VITAMIN D3) 4,000 unit cap Take 4,000 Units by mouth daily. Qty: 90 Tab, Refills: 3      clotrimazole-betamethasone (LOTRISONE) topical cream Apply  to affected area two (2) times a day. Qty: 15 g, Refills: 0      amiodarone (CORDARONE) 200 mg tablet TAKE 1 TABLET BY MOUTH DAILY  Qty: 30 Tab, Refills: 5      valsartan (DIOVAN) 80 mg tablet Take 1 Tab by mouth daily. Qty: 90 Tab, Refills: 3      atorvastatin (LIPITOR) 20 mg tablet Take 1 Tab by mouth daily. Qty: 90 Tab, Refills: 3      metoprolol (LOPRESSOR) 25 mg tablet Take 1 Tab by mouth two (2) times a day.   Qty: 180 Tab, Refills: 3      Blood-Glucose Meter (FREESTYLE INSULINX) misc Test blood sugar once a week  Qty: 1 Each, Refills: 11      Lancets (FREESTYLE LANCETS) misc Check blood sugar daily and record  Qty: 1 Package, Refills: 11    Associated Diagnoses: DM type 2 (diabetes mellitus, type 2) ([facility])      glucose blood VI test strips (FREESTYLE INSULINX) strip Test blood sugar daily and record  Qty: 1 Package, Refills: 11    Associated Diagnoses: DM type 2 (diabetes mellitus, type 2) ([facility]           STOP taking these medications       chlorthalidone (HYGROTEN) 50 mg tablet Comments:   Reason for Stopping:         amLODIPine (NORVASC) 10 mg tablet Comments:   Reason for Stopping:               The patient's chart, MAR and AVS were reviewed by Esther Womack PHARMD.    Discharging Provider: Dr. Carolyn Henriquez    Thank KALANI White

## 2017-04-12 NOTE — TELEPHONE ENCOUNTER
S/W Karin Nicholson; Dr. Chris Castle out of office. Advised to contact discharging provider to obtain order.

## 2017-04-12 NOTE — PROGRESS NOTES
NITA met with Pt and gave him a copy of the [employer] IM letter. Signed copy is on the bedside chart. Copy given to the Pt. Cardiac CM asked SW to ask Pt if he was agreeable to a Hospital to Home visit. Pt was agreeable to have a nurse come out for a visit to make sure he understood his medications since he is a Heart Failure Bundle Patient. Cardiac CM has reached out to his MD to make the MD follow up appointments for this Pt. She will let me know if I need to make any calls to the MD office. NITA called the Dr. Óscar Haque office to try to get an order for [facility]. Dr. Lexy Oneill is on vacation this week. I left a VM for his office so that the MD that is covering can make that decision. NITA called [facility] to give them a heads up on the referral and she indicated that they have a delay for nursing until Saturday, 4/15/17. Pt indicated that his wife will be up shortly to transport him home. CM will continue to monitor discharge plans. 9:45 AM   Dr. Óscar Haque office called and said that we should call Dr. Shabana Weiss office for the [facility] order. Left a VM with Dr. Shabana Weiss office for the [facility] order. 11:40 AM:   Pt has discharged home. Still no  order for [facility]. Called Woody Espinoza, and she gave me Dr. Shabana Weiss Nurse Navigator's Phone Number: Leoncio Watters, [phone]. I called Nadeen Pearce but no answer and no option for VM. NITA will keep waiting for Dr. Andersen Mystic to call back.      [place], [address]

## 2017-04-12 NOTE — DISCHARGE SUMMARY
[phone] SUMMARY       Name:  Radha Negron   MR#:  [id number]   :  1925   Account #:  [de-identified]        Date of Adm:  2017       FINAL DIAGNOSES:   1. Congestive heart failure. 2. Diastolic heart failure. 3. History of hypertension. 4. History of pacemaker. 5. History of type 2 diabetes. 6. History of chronic renal failure. DISCHARGE MEDICATIONS:   1. Lasix 40 mg 2 tablets daily. 2. Diovan 80 mg daily. 3. Atorvastatin 20 mg daily. 4. Metoprolol 25 mg b.i.d.. 5. Vitamin D 4000 units daily. FOLLOWUP: The patient is to followup with Dr. Terri Escobedo next week. CONSULTATIONS:   1. Cardiology, Dr. Krystyna Albert.   2. Renal, Dr. Elizabeth Reveles. HISTORY OF PRESENT ILLNESS: The patient is a very pleasant 80  year-old retired [employer] -[employer] male who came to the   emergency room with shortness of breath. The patient had been taking   his garbage out the evening prior to admission, went to bed, woke up 7   in the morning and had to sit on edge of his bed trying to catch his   breath. He got up and went to the bathroom and he became even   more short of breath. Apparently, he had decreased his chlorthalidone   from 50 mg to 25 mg daily and has been eating a lot of salt. There is a   question of him stopping his Valsartan and amlodipine. PAST MEDICAL HISTORY: Significant for cardiac arrhythmias,   followed by Dr. Krystyna Albert, history of pacemaker, history of carpal tunnel   syndrome, history of arthritis, history of chronic renal failure, history of   diabetes, history of erectile dysfunction, history of hypertension, history   of hyperlipidemia, history of subdural hematoma. PAST SURGICAL HISTORY: Includes pacemaker insertion and   evacuation of a subdural hematoma in . SOCIAL HISTORY: Smoking is negative. Alcohol is negative. ALLERGIES: LISINOPRIL CAUSED SWELLING.     MEDICATIONS ON ADMISSION:   1. Chlorthalidone 50 mg daily. 2. Vitamin D3 4000 units daily. 3. Lotrisone cream p.r.n.. 4. Amiodarone mg daily. 5. Valsartan 80 mg daily. 6. Amlodipine 10 mg daily. 7. Atorvastatin 20 mg daily. 8. Metoprolol 25 mg b.i.d.. REVIEW OF SYSTEMS   Please see HPI. PHYSICAL EXAMINATION   GENERAL: On admission, awake, alert, pleasant 70-year-old    American male. VITAL SIGNS: Blood pressure 119/54, pulse 71, temperature 97.9,   respiratory rate 22, height 5 foot 6, weight is 141. GENERAL: Alert, cooperative. NECK: Supple, symmetrical. No bruits. LUNGS: Coarse breath sounds, right greater than left. No wheezing or   rhonchi. CARDIOVASCULAR: Regular rhythm and rate. No murmurs, rubs, or   gallops. EXTREMITIES: Right greater than the left 1-2+ edema. ABDOMEN: Soft, nontender. NEUROLOGIC: Nonfocal.    HOSPITAL COURSE: The patient was admitted. He was treated with   IV Lasix. He was seen in consultation by Cardiology, Dr. Dorothy Boykin   and Renal, Dr. Emili Clement. White count 7800, hemoglobin 13.1, platelets   118,919. Potassium 3.8, BUN 27, creatinine 1.91. ProBNP was 4882,   troponin 0.05. GFR was 40. The patient had the following studies while   in the hospital: He had a 2-D echo with an ejection fraction 50% to   55%, no obvious wall motion abnormalities. Moderately dilated left   atrium, mild mitral regurgitation, mild to moderate tricuspid valve   regurgitation, mild aortic valve regurgitation. He had a chest x-ray done   which showed mild CHF pattern of pulmonary edema, which was new   compared to an x-ray 10 days ago. Because of swelling in his legs he   had a Doppler ultrasound done and no evidence of DVT. There also   was some concern of a PE, and a CTA could not be done because of   renal failure. He did get a nuclear medicine VQ scan which was low   probability for pulmonary embolism.   The patient, after being diuresed,   felt much better, was up walking in the halls, breathing well, and he   was very anxious to go home. He was stable and discharged home on   04/12/2017. The patient is to follow with his doctor, Dr. Lula Broussard, in one week.           Hardeep Carrero MD MS / FARZANA   D:  04/12/2017   15:59   T:  04/12/2017   16:58   Job #:  [id number]

## 2017-04-12 NOTE — PROGRESS NOTES
Checked patient's blood sugar at 0706 and it was 74. Gave patient 4 oz of cranberry juice and rechecked blood sugar 15 minutes later. Blood sugar was 77. Gave patient 4 oz of orange juice. Rechecked blood sugar 15 minutes later and it was at 109.  Patient is waiting on breakfast.

## 2017-04-12 NOTE — PROGRESS NOTES
Nurse Solis called me to inquire about amiodarone, which have been stopped by Dr. David Carter with Dr. Joce Etienne.  He stopped it due to normal pacemaker check and concerns of hypoxia. Discharge medication list updated. She will call the patient to confirm he knows to stop the amiodarone.   Copy of this note to Dr. Eonch Bedolla and Dr. Joce Etienne.

## 2017-04-13 NOTE — PROGRESS NOTES
Called pt to follow up on hospital discharge from [facility] for CHF. LVM stating my name, NN at [facility], date and time of call, and direct office telephone number. I also informed pt that he has an appt at [facility] with Dr. Jayna Schulte on Friday, 4/14/17 at [address] [place] [address]. I also provided the practice telephone number.

## 2017-04-14 NOTE — PROGRESS NOTES
Subjective/HPI:     Charlotte Oneil is a 80 y.o. male is here for f/u appt after hospitalization for acute on chronic CHF. He reports feeling much better. His swelling is improved, no sob. The patient denies chest pain, orthopnea, PND, palpitations, syncope, presyncope or fatigue. PCP Provider  Seda Ellis MD  Past Medical History:   Diagnosis Date    Arrhythmia     Arthritis     Carpal tunnel syndrome 2006    CKD (chronic kidney disease) 3/2/2017    DM type 2 (diabetes mellitus, type 2) ([address] 10/28/2009    ED (erectile dysfunction) 10/28/2009    HTN (hypertension) 10/28/2009    Hyperlipidemia 10/28/2009    Neuropathy 10/28/2009    Other ill-defined conditions     hyperlipidemia    Pacemaker     [facility]     Systolic CHF ([facility] 70/18/4502    Vertigo 2007      Past Surgical History:   Procedure Laterality Date    HX PACEMAKER      NEUROLOGICAL PROCEDURE UNLISTED  1999    evacuation SDH; [facility]. Allergies   Allergen Reactions    Lisinopril Swelling     angioedema      Family History   Problem Relation Age of Onset    Stroke Mother     Pneumonia Father     MS Child     Cancer Child      breast    Hearing Impairment Child       Current Outpatient Prescriptions   Medication Sig    furosemide (LASIX) 40 mg tablet Take 1 Tab by mouth daily.  cholecalciferol, vitamin D3, (VITAMIN D3) 4,000 unit cap Take 4,000 Units by mouth daily.  clotrimazole-betamethasone (LOTRISONE) topical cream Apply  to affected area two (2) times a day.  valsartan (DIOVAN) 80 mg tablet Take 1 Tab by mouth daily.  atorvastatin (LIPITOR) 20 mg tablet Take 1 Tab by mouth daily.  metoprolol (LOPRESSOR) 25 mg tablet Take 1 Tab by mouth two (2) times a day.     Blood-Glucose Meter (FREESTYLE INSULINX) misc Test blood sugar once a week    Lancets (FREESTYLE LANCETS) misc Check blood sugar daily and record    glucose blood VI test strips (FREESTYLE INSULINX) strip Test blood sugar daily and record    amiodarone (CORDARONE) 200 mg tablet TAKE 1 TABLET BY MOUTH DAILY     No current facility-administered medications for this visit. Vitals:    04/14/17 0906 04/14/17 0916   BP: 90/54 94/54   Pulse: 68    Resp: 18    SpO2: 98%    Weight: 136 lb 8 oz (61.9 kg)    Height: 5' 6\" (1.676 m)      Social History     Social History    Marital status:      Spouse name: N/A    Number of children: N/A    Years of education: N/A     Occupational History    Not on file. Social History Main Topics    Smoking status: Never Smoker    Smokeless tobacco: Never Used    Alcohol use No    Drug use: No    Sexual activity: Yes     Partners: Female     Birth control/ protection: None     Other Topics Concern    Not on file     Social History Narrative    Retired                I have reviewed the nurses notes, vitals, problem list, allergy list, medical history, family, social history and medications. Review of Symptoms:    General: Pt denies excessive weight gain or loss. Pt is able to conduct ADL's  HEENT: Denies blurred vision, headaches, epistaxis and difficulty swallowing. Respiratory: Denies worsening shortness of breath, SENIOR, wheezing or stridor. Cardiovascular: Denies precordial pain, palpitations, denies worsening edema or PND  Gastrointestinal: Denies poor appetite, indigestion, abdominal pain or blood in stool  Urinary: Denies dysuria, pyuria  Musculoskeletal: Denies pain or swelling from muscles or joints  Neurologic: Denies tremor, paresthesias, or sensory motor disturbance  Skin: Denies rash, itching or texture change. Psych: Denies depression        Physical Exam:      General: Well developed, in no acute distress, cooperative and alert  HEENT: No carotid bruits, no JVD, trach is midline. Neck Supple, PEERL, EOM intact. Heart:  Normal S1/S2 negative S3 or S4.  Regular, no murmur, gallop or rub.   Respiratory: Clear bilaterally x 4, no wheezing or rales  Abdomen:   Soft, non-tender, no masses, bowel sounds are active.   Extremities:  Trace right pedal edema, left LE no edema, normal cap refill, no cyanosis, atraumatic. Neuro: A&Ox3, speech clear, gait stable. Skin: Skin color is normal. No rashes or lesions. Non diaphoretic  Vascular: 2+ pulses symmetric in all extremities    Cardiographics    ECG: V paced, -Frequent pvcs -ventricular trigeminy      Results for orders placed or performed during the hospital encounter of 04/09/17   EKG, 12 LEAD, INITIAL   Result Value Ref Range    Ventricular Rate 70 BPM    Atrial Rate 72 BPM    P-R Interval 224 ms    QRS Duration 90 ms    Q-T Interval 422 ms    QTC Calculation (Bezet) 455 ms    Calculated R Axis 24 degrees    Calculated T Axis -63 degrees    Diagnosis       Electronic atrial pacemaker  Nonspecific ST and T wave abnormality    Confirmed by Vishal Machuca MD, [facility] ([id number]) on 4/9/2017 3:13:12 PM     Results for orders placed or performed in visit on 07/22/13   HOLTER MONITOR, 24 HOURS    Narrative    ECG Monitor/24 hours, Complete    Reason for Holter Monitor  PVC's    Heartbeat    Slowest 43  Average 61  Fastest  125        Results:   Underlying Rhythm: Normal sinus rhythm      Atrial Arrhythmias: premature atrial contractions; occasional and  severe bradycardia            AV Conduction: normal    Ventricular Arrhythmias: premature ventricular contractions;  frequent and ventricular couplets     ST Segment Analysis:normal     Symptom Correlation:  None reported    Comment:   Sinus rhythm with profound daytime bradycardia to the 30s and  ventricular bigeminy. Clinical correlation advised. Pinkey Leyden, MD, Jennie Meléndez      Results for orders placed or performed in visit on 11/30/09   AMB POC EKG ROUTINE W/ 12 LEADS, INTER & REP    Impression    Sinus arrhythmia at approximately 65 beats/min patient with very  frequent PACs. No other acute ST-T wave changes.          Cardiology Labs:  Lab Results   Component Value Date/Time    Cholesterol, total 210 11/03/2016 10:32 AM    HDL Cholesterol 62 11/03/2016 10:32 AM    LDL, calculated 134 11/03/2016 10:32 AM    Triglyceride 71 11/03/2016 10:32 AM    CHOL/HDL Ratio 2.7 10/12/2010 09:21 AM       Lab Results   Component Value Date/Time    Sodium 136 04/11/2017 02:29 AM    Potassium 3.9 04/11/2017 02:29 AM    Chloride 100 04/11/2017 02:29 AM    CO2 27 04/11/2017 02:29 AM    Anion gap 9 04/11/2017 02:29 AM    Glucose 85 04/11/2017 02:29 AM    BUN 33 04/11/2017 02:29 AM    Creatinine 2.33 04/11/2017 02:29 AM    BUN/Creatinine ratio 14 04/11/2017 02:29 AM    GFR est AA 32 04/11/2017 02:29 AM    GFR est non-AA 26 04/11/2017 02:29 AM    Calcium 8.7 04/11/2017 02:29 AM    AST (SGOT) 22 04/09/2017 04:49 AM    Alk. phosphatase 109 04/09/2017 04:49 AM    Protein, total 7.3 04/09/2017 04:49 AM    Albumin 3.5 04/09/2017 04:49 AM    Globulin 3.8 04/09/2017 04:49 AM    A-G Ratio 0.9 04/09/2017 04:49 AM    ALT (SGPT) 34 04/09/2017 04:49 AM           Assessment:     Assessment:     Alberto Dewey was seen today for hospital follow up. Diagnoses and all orders for this visit:    Chronic systolic HF (heart failure) (HCC)    SSS (sick sinus syndrome) (HCC)    Essential hypertension  -     AMB POC EKG ROUTINE W/ 12 LEADS, INTER & REP    Mixed hyperlipidemia    Type 2 diabetes mellitus without complication, unspecified long term insulin use status        ICD-10-CM ICD-9-CM    1. Chronic systolic HF (heart failure) (HCC) I50.22 428.22    2. SSS (sick sinus syndrome) (HCC) I49.5 427.81    3. Essential hypertension I10 401.9 AMB POC EKG ROUTINE W/ 12 LEADS, INTER & REP   4. Mixed hyperlipidemia E78.2 272.2    5.  Type 2 diabetes mellitus without complication, unspecified long term insulin use status E11.9 250.00      Orders Placed This Encounter    AMB POC EKG ROUTINE W/ 12 LEADS, INTER & REP     Order Specific Question:   Reason for Exam:     Answer:   routine        Plan:     Patient presents today for f/u after hospital admission for CHF exacerbation. Feels like he his sob/edema are stable. He is V paced with frequent PVCs. BP is normotensive. Weight stable. CHF compensated. Pt given a scale with instructions on daily weight, call if his weight starts trending up over 2lbs in 24hrs or 5lbs in a week. He has appt next Friday with Dr Gladys Hastings. Dr Mayda Mar on 4/25. Continue current care and f/u in 1 month.     Terra Arteaga NP

## 2017-04-14 NOTE — MR AVS SNAPSHOT
Visit Information Date & Time Provider Department Dept. Phone Encounter #  
 4/14/2017  9:00 AM Sheri Blackmon, [address] [facility] [id number] [phone] Follow-up Instructions Return in about 1 month (around 5/14/2017). Follow-up and Disposition History Your Appointments 4/21/2017  3:00 PM  
ROUTINE CARE with Arnaud Springer MD  
[facility]) Appt Note: hospital f/up. Pt was d/c from [facility] 4/12/2017 for dizziness. Pt was instructed to follow up with PCP asap. 04/13/2017MS  
 [address] [facility]  
[phone]  
  
   
 [address] [facility]  [address] [facility]  
  
    
 4/25/2017  2:45 PM  
2 WEEK with June Martinez MD  
[facility]) Appt Note: 2 week follow up rs pt  
 [address]  
[phone] [address]  
  
    
 5/22/2017  2:00 PM  
1 MONTH with Sheri Blackmon MD  
[facility]) Appt Note: $0CP 4/14/17ksr [address]  
[phone] [address]  
  
    
 8/10/2017  1:45 PM  
PACEMAKER with PACEMAKER, [facility]) Appt Note: 6MO BSC DCPM  
 [phone] [facility]  
[phone] [address]  
  
    
 8/10/2017  2:00 PM  
ANNUAL with Sesar De Jesus NP [facility] Appt Note: annual with device check [address]  
[phone] [address] Upcoming Health Maintenance Date Due Pneumococcal 65+ Low/Medium Risk (2 of 2 - PPSV23) 10/15/2015 MEDICARE YEARLY EXAM 6/9/2016 EYE EXAM RETINAL OR DILATED Q1 11/18/2016 HEMOGLOBIN A1C Q6M 5/3/2017 MICROALBUMIN Q1 11/3/2017 LIPID PANEL Q1 11/3/2017 GLAUCOMA SCREENING Q2Y 11/18/2017 FOOT EXAM Q1 3/2/2018 DTaP/Tdap/Td series (2 - Td) 11/8/2026 Allergies as of 4/14/2017  Review Complete On: 4/14/2017 By: Tanya Dodge NP Severity Noted Reaction Type Reactions Lisinopril High 06/22/2009   Side Effect Swelling  
 angioedema Current Immunizations  Reviewed on 10/9/2015 Name Date Influenza Vaccine 10/1/2015, 10/3/2014, 10/3/2014, 9/6/2013 Influenza Vaccine (Quad) PF 11/3/2016 Pneumococcal Vaccine (Unspecified Type) 10/15/2010 TD Vaccine 5/6/1977 Tdap 11/8/2016 10:51 AM, 6/9/2015 Zoster Vaccine, Live 10/1/2015 Not reviewed this visit You Were Diagnosed With   
  
 Codes Comments Chronic systolic HF (heart failure) (HCC)    -  Primary ICD-10-CM: U30.23 ICD-9-CM: 428.22   
 SSS (sick sinus syndrome) (HCC)     ICD-10-CM: I49.5 ICD-9-CM: 427.81 Essential hypertension     ICD-10-CM: I10 
ICD-9-CM: 401.9 Mixed hyperlipidemia     ICD-10-CM: E78.2 ICD-9-CM: 272.2 Type 2 diabetes mellitus without complication, unspecified long term insulin use status     ICD-10-CM: E11.9 ICD-9-CM: 250.00 Vitals BP Pulse Resp Height(growth percentile) Weight(growth percentile) SpO2  
 94/54 (BP 1 Location: Right arm, BP Patient Position: Sitting) 68 18 5' 6\" (1.676 m) 136 lb 8 oz (61.9 kg) 98% BMI Smoking Status 22.03 kg/m2 Never Smoker Vitals History BMI and BSA Data Body Mass Index Body Surface Area 22.03 kg/m 2 1.7 m 2 Preferred Pharmacy Pharmacy Name Phone [facility]  [place] [phone] Your Updated Medication List  
  
   
This list is accurate as of: 4/14/17  9:47 AM.  Always use your most recent med list.  
  
  
  
  
 amiodarone 200 mg tablet Commonly known as:  CORDARONE  
TAKE 1 TABLET BY MOUTH DAILY  
  
 atorvastatin 20 mg tablet Commonly known as:  LIPITOR Take 1 Tab by mouth daily. Blood-Glucose Meter Misc Commonly known as:  FREESTYLE INSULINX Test blood sugar once a week  
  
 cholecalciferol (vitamin D3) 4,000 unit Cap Commonly known as:  VITAMIN D3 Take 4,000 Units by mouth daily. clotrimazole-betamethasone topical cream  
Commonly known as:  Cortez Jane Apply  to affected area two (2) times a day. furosemide 40 mg tablet Commonly known as:  LASIX Take 1 Tab by mouth daily. glucose blood VI test strips strip Commonly known as:  FREESTYLE INSULINX Test blood sugar daily and record Lancets Misc Commonly known as:  FREESTYLE LANCETS Check blood sugar daily and record  
  
 metoprolol tartrate 25 mg tablet Commonly known as:  LOPRESSOR Take 1 Tab by mouth two (2) times a day. valsartan 80 mg tablet Commonly known as:  DIOVAN Take 1 Tab by mouth daily. We Performed the Following AMB POC EKG ROUTINE W/ 12 LEADS, INTER & REP [22249 CPT(R)] Follow-up Instructions Return in about 1 month (around 5/14/2017). Introducing [facility]! Leandra Keller introduces [employer] patient portal. Now you can access parts of your medical record, email your doctor's office, and request medication refills online. 1. In your internet browser, go to [email or web address] 2. Click on the First Time User? Click Here link in the Sign In box. You will see the New Member Sign Up page. 3. Enter your [employer] Access Code exactly as it appears below. You will not need to use this code after youve completed the sign-up process. If you do not sign up before the expiration date, you must request a new code. · [employer] Access Code: [email or web address] Expires: 5/17/2017 10:07 AM 
 
 4. Enter the last four digits of your Social Security Number (xxxx) and Date of Birth (mm/dd/yyyy) as indicated and click Submit. You will be taken to the next sign-up page. 5. Create a Sec[employer] ID. This will be your [employer] login ID and cannot be changed, so think of one that is secure and easy to remember. 6. Create a [employer] password. You can change your password at any time. 7. Enter your Password Reset Question and Answer. This can be used at a later time if you forget your password. 8. Enter your e-mail address. You will receive e-mail notification when new information is available in [address] 9. Click Sign Up. You can now view and download portions of your medical record. 10. Click the Download Summary menu link to download a portable copy of your medical information. If you have questions, please visit the Frequently Asked Questions section of the [employer] website. Remember, [employer] is NOT to be used for urgent needs. For medical emergencies, dial 911. Now available from your iPhone and Android! Please provide this summary of care documentation to your next provider. Your primary care clinician is listed as [place]. If you have any questions after today's visit, please call [phone].

## 2017-04-14 NOTE — PROGRESS NOTES
Met pt during office visit. Introduced myself and that I will be calling pt. Pt reports that he does not have a scale so I provided him with one and we discussed that he needs to weigh daily. Pt reminded to follow a low Na diet and to call RCA with wt gain >2-3 lbs overnight or with symptoms such as SOB or ankle or belly swelling. Pt does eat canned vegetables and rinses some that he does. Pt encouraged to mix frozen vegetables with canned vegetables to reduce Na. Pt states appreciation. I provided pt with my direct office telephone number and will continue to follow. Pt reports that he will follow up with PCP and that his son will attend that appt with him.

## 2017-04-14 NOTE — PROGRESS NOTES
Chief Complaint   Patient presents with   [facility] Follow Up     pt denies any cardiac symptoms

## 2017-04-19 NOTE — PROGRESS NOTES
Called pt to follow up on hospital discharge. Pt reports that wt has not changed and remains steady at 136 lbs. Pt confirmed that he has PCP appt on 4/21/17 but son will not be able to attend. Pt reports that son is requesting to have MyChart set up. PCP NN notified of telephone call. Pt states that he is following a low Na diet and had no SOB nor swelling.

## 2017-04-21 NOTE — PROGRESS NOTES
Reviewed record in preparation for visit and have obtained necessary documentation. Identified pt with two pt identifiers(name and ). Chief Complaint   Patient presents with   [facility] Follow Up     pt was in [facility] from  - because of dizziness and shortness of breath diagnose with timbo on his lungs       Health Maintenance Due   Topic Date Due    Pneumococcal 65+ Low/Medium Risk (2 of 2 - PPSV23) 10/15/2015    MEDICARE YEARLY EXAM  2016    EYE EXAM RETINAL OR DILATED Q1  2016    HEMOGLOBIN A1C Q6M  2017       Coordination of Care Questionnaire:  :     1. Have you been to the ER, urgent care clinic since your last visit? Hospitalized since your last visit? Yes [facility]    2. Have you seen or consulted any other health care providers outside of the [address] [place] [address] since your last visit? Include any pap smears or colon screening.  no

## 2017-04-21 NOTE — PATIENT INSTRUCTIONS
scrible Activation    Thank you for requesting access to scrible. Please follow the instructions below to securely access and download your online medical record. scrible allows you to send messages to your doctor, view your test results, renew your prescriptions, schedule appointments, and more. How Do I Sign Up? 1. In your internet browser, go to [email or web address]. 2. Click on the First Time User? Click Here link in the Sign In box. You will see the New Member Sign Up page. 3. Enter your scrible Access Code exactly as it appears below. You will not need to use this code after youve completed the sign-up process. If you do not sign up before the expiration date, you must request a new code. [employer]rible Access Code: [email or web address]  Expires: 2017 10:07 AM (This is the date your scrible access code will )    4. Enter the last four digits of your Social Security Number (xxxx) and Date of Birth (mm/dd/yyyy) as indicated and click Submit. You will be taken to the next sign-up page. 5. Create a scrible ID. This will be your scrible login ID and cannot be changed, so think of one that is secure and easy to remember. 6. Create a scrible password. You can change your password at any time. 7. Enter your Password Reset Question and Answer. This can be used at a later time if you forget your password. 8. Enter your e-mail address. You will receive e-mail notification when new information is available in [address]. 9. Click Sign Up. You can now view and download portions of your medical record. 10. Click the Download Summary menu link to download a portable copy of your medical information. Additional Information    If you have questions, please visit the Frequently Asked Questions section of the scrible website at [email or web address] Remember, scrible is NOT to be used for urgent needs. For medical emergencies, dial 911.

## 2017-04-21 NOTE — PROGRESS NOTES
SUBJECTIVE  Mr. Leena Hogan. presents today acutely for     Chief Complaint   Patient presents with   [facility] Follow Up     pt was in [facility] from April 16 -April 19,2017 because of dizziness and shortness of breath diagnose with fuild on his lungs       He was hospitalized:     HISTORY OF PRESENT ILLNESS: The patient is a very pleasant 80  year-old retired [employer] male who came to the   emergency room with shortness of breath. The patient had been taking   his garbage out the evening prior to admission, went to bed, woke up 7   in the morning and had to sit on edge of his bed trying to catch his   breath. He got up and went to the bathroom and he became even   more short of breath. Apparently, he had decreased his chlorthalidone   from 50 mg to 25 mg daily and has been eating a lot of salt. There is a   question of him stopping his Valsartan and amlodipine.      HOSPITAL COURSE: The patient was admitted. He was treated with   IV Lasix. He was seen in consultation by Cardiology, Dr. Ashlee Galindo   and Renal, Dr. Virginie Lopez. White count 7800, hemoglobin 13.1, platelets   024,412. Potassium 3.8, BUN 27, creatinine 1.91. ProBNP was 4882,   troponin 0.05. GFR was 40. The patient had the following studies while   in the hospital: He had a 2-D echo with an ejection fraction 50% to   55%, no obvious wall motion abnormalities. Moderately dilated left   atrium, mild mitral regurgitation, mild to moderate tricuspid valve   regurgitation, mild aortic valve regurgitation. He had a chest x-ray done   which showed mild CHF pattern of pulmonary edema, which was new   compared to an x-ray 10 days ago. Because of swelling in his legs he   had a Doppler ultrasound done and no evidence of DVT. There also   was some concern of a PE, and a CTA could not be done because of   renal failure. He did get a nuclear medicine VQ scan which was low   probability for pulmonary embolism.  The patient, after being diuresed, felt much better, was up walking in the halls, breathing well, and he   was very anxious to go home. He was stable and discharged home on   04/12/2017.        Now, doing better. No CP, SOB, swelling. He has future appt with Dr. Xiomara King.     Current Outpatient Prescriptions on File Prior to Visit   Medication Sig Dispense Refill    furosemide (LASIX) 40 mg tablet Take 1 Tab by mouth daily. 30 Tab 12    cholecalciferol, vitamin D3, (VITAMIN D3) 4,000 unit cap Take 4,000 Units by mouth daily. 90 Tab 3    valsartan (DIOVAN) 80 mg tablet Take 1 Tab by mouth daily. 90 Tab 3    atorvastatin (LIPITOR) 20 mg tablet Take 1 Tab by mouth daily. 90 Tab 3    metoprolol (LOPRESSOR) 25 mg tablet Take 1 Tab by mouth two (2) times a day. 180 Tab 3    Blood-Glucose Meter (FREESTYLE INSULINX) misc Test blood sugar once a week 1 Each 11    Lancets (FREESTYLE LANCETS) misc Check blood sugar daily and record 1 Package 11    glucose blood VI test strips (FREESTYLE INSULINX) strip Test blood sugar daily and record 1 Package 11    clotrimazole-betamethasone (LOTRISONE) topical cream Apply  to affected area two (2) times a day. 15 g 0    amiodarone (CORDARONE) 200 mg tablet TAKE 1 TABLET BY MOUTH DAILY 30 Tab 5     No current facility-administered medications on file prior to visit. SH:  reports that he has never smoked. He has never used smokeless tobacco. He reports that he does not drink alcohol or use illicit drugs. ROS: Complete review of systems was performed and is otherwise unremarkable except as noted elsewhere. OBJECTIVE  Visit Vitals    BP 98/61    Pulse 76    Temp 97.7 °F (36.5 °C) (Oral)    Resp 18    Ht 5' 6\" (1.676 m)    Wt 133 lb (60.3 kg)    SpO2 98%    BMI 21.47 kg/m2     Gen: Pleasant 80 y.o.  male in NAD.   HEENT: PERRLA. EOMI. OP moist and pink.  Neck: Supple.  No LAD.  HEART: RRR, No M/G/R.   LUNGS: CTAB No W/R.   ABDOMEN: S, NT, ND, BS+.   EXTREMITIES: Warm.  No C/C/E. MUSCULOSKELETAL: Normal ROM, muscle strength 5/5 all groups. NEURO: Alert and oriented x 3.  Cranial nerves grossly intact.  No focal sensory or motor deficits noted. SKIN: Warm. Dry. No rashes or other lesions noted. Chest x-ray:  Mild CHF pattern of pulmonary edema, which was new compared to an x-ray 10 days ago. 2-D echo: LV ejection fraction 50% to 55%, no obvious wall motion abnormalities. Moderately dilated left atrium, mild mitral regurgitation, mild to moderate tricuspid valve regurgitation, mild aortic valve regurgitation. Doppler ultrasound lower extremity:  No evidence of DVT. VQ scan was low probability for pulmonary embolism. ASSESSMENT / PLAN    ICD-10-CM ICD-9-CM    1. Paroxysmal atrial fibrillation (HCC) I48.0 427.31 Future follow up as per Dr. Janet Jaimes. Last EKG shows paced rhythm. 2. Systolic CHF, acute (HCC) I50.21 428.21 Clinically improved / compensated. 428.0        I have reviewed with the patient details of the assessment and plan and all questions were answered. Relevant patient education was performed. Follow-up Disposition:  Return in about 4 months (around 8/21/2017) for DM.

## 2017-04-21 NOTE — MR AVS SNAPSHOT
Visit Information Date & Time Provider Department Dept. Phone Encounter #  
 4/21/2017  3:00 PM Yong Cunningham, [address] [id number] Follow-up Instructions Return in about 4 months (around 8/21/2017) for DM. Your Appointments 5/4/2017 10:15 AM  
2 WEEK with Libertad Castellano MD  
[facility] [address] [place] [address]) Appt Note: 2 week follow up/  Moved pt #[id number] to 5/23/17 4/11/17ksr; 2 week follow up/ Moved pt #[id number] to 5/23/17 4/11/17ksr [facility]  
[phone] [facility]  
  
    
 5/22/2017  2:00 PM  
1 MONTH with Scooter De León MD  
[facility] [address]) Appt Note: $0CP 4/14/17ksr [facility]  
[phone] [facility]  
  
    
 8/10/2017  1:45 PM  
PACEMAKER with PACEMAKER, [facility] [address] [place] [address]) Appt Note: 6MO BSC DCPM  
 [phone] [facility]  
[phone] [facility]  
  
    
 8/10/2017  2:00 PM  
ANNUAL with Vaishnavi Louise NP [facility] [address]) Appt Note: annual with device check [facility]  
[phone] [facility] Upcoming Health Maintenance Date Due Pneumococcal 65+ Low/Medium Risk (2 of 2 - PPSV23) 10/15/2015 MEDICARE YEARLY EXAM 6/9/2016 EYE EXAM RETINAL OR DILATED Q1 11/18/2016 HEMOGLOBIN A1C Q6M 5/3/2017 MICROALBUMIN Q1 11/3/2017 LIPID PANEL Q1 11/3/2017 GLAUCOMA SCREENING Q2Y 11/18/2017 FOOT EXAM Q1 3/2/2018 DTaP/Tdap/Td series (2 - Td) 11/8/2026 Allergies as of 4/21/2017  Review Complete On: 4/21/2017 By: Yong Cunningham MD  
  
 Severity Noted Reaction Type Reactions Lisinopril High 06/22/2009   Side Effect Swelling  
 angioedema Current Immunizations  Reviewed on 10/9/2015 Name Date Influenza Vaccine 10/1/2015, 10/3/2014, 10/3/2014, 9/6/2013 Influenza Vaccine (Quad) PF 11/3/2016 Pneumococcal Vaccine (Unspecified Type) 10/15/2010 TD Vaccine 5/6/1977 Tdap 11/8/2016 10:51 AM, 6/9/2015 Zoster Vaccine, Live 10/1/2015 Not reviewed this visit You Were Diagnosed With   
  
 Codes Comments Paroxysmal atrial fibrillation (HCC)    -  Primary ICD-10-CM: I48.0 ICD-9-CM: 427.31 Systolic CHF, acute ([facility]     ICD-10-CM: I50.21 ICD-9-CM: 428.21, 428.0 Vitals BP Pulse Temp Resp Height(growth percentile) Weight(growth percentile) 98/61 76 97.7 °F (36.5 °C) (Oral) 18 5' 6\" (1.676 m) 133 lb (60.3 kg) SpO2 BMI Smoking Status 98% 21.47 kg/m2 Never Smoker BMI and BSA Data Body Mass Index Body Surface Area  
 21.47 kg/m 2 1.68 m 2 Preferred Pharmacy Pharmacy Name Phone [facility] [address] [place] [address] [phone] Your Updated Medication List  
  
   
This list is accurate as of: 4/21/17  3:45 PM.  Always use your most recent med list.  
  
  
  
  
 atorvastatin 20 mg tablet Commonly known as:  LIPITOR Take 1 Tab by mouth daily. Blood-Glucose Meter Misc Commonly known as:  FREESTYLE INSULINX Test blood sugar once a week  
  
 cholecalciferol (vitamin D3) 4,000 unit Cap Commonly known as:  VITAMIN D3 Take 4,000 Units by mouth daily. furosemide 40 mg tablet Commonly known as:  LASIX Take 1 Tab by mouth daily. glucose blood VI test strips strip Commonly known as:  FREESTYLE INSULINX Test blood sugar daily and record Lancets Misc Commonly known as:  FREESTYLE LANCETS Check blood sugar daily and record  
  
 metoprolol tartrate 25 mg tablet Commonly known as:  LOPRESSOR Take 1 Tab by mouth two (2) times a day. pneumococcal 13 gomez conj dip 0.5 mL Syrg injection Commonly known as:  PREVNAR-13  
0.5 mL by IntraMUSCular route once for 1 dose. valsartan 80 mg tablet Commonly known as:  DIOVAN Take 1 Tab by mouth daily. Prescriptions Printed Refills  
 pneumococcal 13 gomez conj dip (PREVNAR-13) 0.5 mL syrg injection 0 Si.5 mL by IntraMUSCular route once for 1 dose. Class: Print Route: IntraMUSCular Prescriptions Sent to Pharmacy Refills  
 valsartan (DIOVAN) 80 mg tablet 3 Sig: Take 1 Tab by mouth daily. Class: Normal  
 [facility] [address] #: [phone] Route: Oral  
  
Follow-up Instructions Return in about 4 months (around 2017) for DM. Patient Instructions HDmessaging Activation Thank you for requesting access to HDmessaging. Please follow the instructions below to securely access and download your online medical record. HDmessaging allows you to send messages to your doctor, view your test results, renew your prescriptions, schedule appointments, and more. How Do I Sign Up? 1. In your internet browser, go to [email or web address]. 2. Click on the First Time User? Click Here link in the Sign In box. You will see the New Member Sign Up page. 3. Enter your HDmessaging Access Code exactly as it appears below. You will not need to use this code after youve completed the sign-up process. If you do not sign up before the expiration date, you must request a new code. HDmessaging Access Code: [email or web address] Expires: 2017 10:07 AM (This is the date your HDmessaging access code will ) 4. Enter the last four digits of your Social Security Number (xxxx) and Date of Birth (mm/dd/yyyy) as indicated and click Submit.  You will be taken to the next sign-up page. 5. Create a Devoliat ID. This will be your Kneebone login ID and cannot be changed, so think of one that is secure and easy to remember. 6. Create a Kneebone password. You can change your password at any time. 7. Enter your Password Reset Question and Answer. This can be used at a later time if you forget your password. 8. Enter your e-mail address. You will receive e-mail notification when new information is available in [address] 9. Click Sign Up. You can now view and download portions of your medical record. 10. Click the Download Summary menu link to download a portable copy of your medical information. Additional Information If you have questions, please visit the Frequently Asked Questions section of the Kneebone website at [email or web address] Remember, Kneebone is NOT to be used for urgent needs. For medical emergencies, dial 911. Introducing [facility]! Martha Carvalho introduces Kneebone patient portal. Now you can access parts of your medical record, email your doctor's office, and request medication refills online. 1. In your internet browser, go to [email or web address] 2. Click on the First Time User? Click Here link in the Sign In box. You will see the New Member Sign Up page. 3. Enter your Kneebone Access Code exactly as it appears below. You will not need to use this code after youve completed the sign-up process. If you do not sign up before the expiration date, you must request a new code. · Kneebone Access Code: [email or web address] Expires: 5/17/2017 10:07 AM 
 
4. Enter the last four digits of your Social Security Number (xxxx) and Date of Birth (mm/dd/yyyy) as indicated and click Submit. You will be taken to the next sign-up page. 5. Create a Devoliat ID. This will be your Kneebone login ID and cannot be changed, so think of one that is secure and easy to remember. 6. Create a Inporia password. You can change your password at any time. 7. Enter your Password Reset Question and Answer. This can be used at a later time if you forget your password. 8. Enter your e-mail address. You will receive e-mail notification when new information is available in [address] 9. Click Sign Up. You can now view and download portions of your medical record. 10. Click the Download Summary menu link to download a portable copy of your medical information. If you have questions, please visit the Frequently Asked Questions section of the Inporia website. Remember, Inporia is NOT to be used for urgent needs. For medical emergencies, dial 911. Now available from your iPhone and Android! Please provide this summary of care documentation to your next provider. Your primary care clinician is listed as [place]. If you have any questions after today's visit, please call [phone].

## 2017-05-04 NOTE — PROGRESS NOTES
Chief Complaint   Patient presents with    Irregular Heart Beat     2 wk appt. Denied cardiac symptoms.

## 2017-05-04 NOTE — MR AVS SNAPSHOT
Visit Information Date & Time Provider Department Dept. Phone Encounter #  
 5/4/2017 10:15 AM Verona Story MD [facility] [phone] [id number] Your Appointments 5/22/2017  2:00 PM  
1 MONTH with Ifeoma Dubon MD  
[facility] [address] [place] [address]) Appt Note: $0CP 4/14/17ksr [address]  
[phone] [address]  
  
    
 8/10/2017  1:45 PM  
PACEMAKER with PACEMAKER, [facility] [address] [facility]) Appt Note: 6MO BSC DCPM  
 [phone] [facility]  
[phone] [address]  
  
    
 8/10/2017  2:00 PM  
ANNUAL with Maria Guadalupe Kelly NP [facility] [address]) Appt Note: annual with device check [address]  
[phone] [address]  
  
    
 8/22/2017 10:30 AM  
ROUTINE CARE with Macarena Gonzalez MD  
[facility] [address] [place] [address]) Appt Note: 4 mon f/u for dm  
 [address] [phone]  
[address] [facility] [address] [place] [address] Upcoming Health Maintenance Date Due Pneumococcal 65+ Low/Medium Risk (2 of 2 - PPSV23) 10/15/2015 MEDICARE YEARLY EXAM 6/9/2016 EYE EXAM RETINAL OR DILATED Q1 11/18/2016 HEMOGLOBIN A1C Q6M 5/3/2017 INFLUENZA AGE 9 TO ADULT 8/1/2017 MICROALBUMIN Q1 11/3/2017 LIPID PANEL Q1 11/3/2017 GLAUCOMA SCREENING Q2Y 11/18/2017 FOOT EXAM Q1 3/2/2018 DTaP/Tdap/Td series (2 - Td) 11/8/2026 Allergies as of 5/4/2017  Review Complete On: 5/4/2017 By: Maria Guadalupe Kelly NP Severity Noted Reaction Type Reactions Lisinopril High 06/22/2009   Side Effect Swelling  
 angioedema Current Immunizations  Reviewed on 10/9/2015 Name Date Influenza Vaccine 10/1/2015, 10/3/2014, 10/3/2014, 9/6/2013 Influenza Vaccine (Quad) PF 11/3/2016 Pneumococcal Vaccine (Unspecified Type) 10/15/2010 TD Vaccine 5/6/1977 Tdap 11/8/2016 10:51 AM, 6/9/2015 Zoster Vaccine, Live 10/1/2015 Not reviewed this visit You Were Diagnosed With   
  
 Codes Comments SSS (sick sinus syndrome) (HCC)    -  Primary ICD-10-CM: I49.5 ICD-9-CM: 427.81 Chronic systolic congestive heart failure (HCC)     ICD-10-CM: I50.22 ICD-9-CM: 428.22, 428.0 Essential hypertension     ICD-10-CM: I10 
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  
 110/70 (BP 1 Location: Left arm, BP Patient Position: Sitting) 80 18 5' 6\" (1.676 m) 136 lb (61.7 kg) 98% BMI Smoking Status 21.95 kg/m2 Never Smoker Vitals History BMI and BSA Data Body Mass Index Body Surface Area  
 21.95 kg/m 2 1.7 m 2 Preferred Pharmacy Pharmacy Name Phone [facility] [address]  [place] [phone] Your Updated Medication List  
  
   
This list is accurate as of: 5/4/17 11:06 AM.  Always use your most recent med list.  
  
  
  
  
 atorvastatin 20 mg tablet Commonly known as:  LIPITOR Take 1 Tab by mouth daily. Blood-Glucose Meter Misc Commonly known as:  FREESTYLE INSULINX Test blood sugar once a week  
  
 cholecalciferol (vitamin D3) 4,000 unit Cap Commonly known as:  VITAMIN D3 Take 4,000 Units by mouth daily. clotrimazole-betamethasone topical cream  
Commonly known as:  LOTRISONE  
APPLY AA BID  
  
 furosemide 40 mg tablet Commonly known as:  LASIX Take 1 Tab by mouth daily. glucose blood VI test strips strip Commonly known as:  FREESTYLE INSULINX Test blood sugar daily and record Lancets Misc Commonly known as:  FREESTYLE LANCETS Check blood sugar daily and record  
  
 metoprolol tartrate 25 mg tablet Commonly known as:  LOPRESSOR Take 1 Tab by mouth two (2) times a day. valsartan 80 mg tablet Commonly known as:  DIOVAN Take 1 Tab by mouth daily. We Performed the Following AMB POC EKG ROUTINE W/ 12 LEADS, INTER & REP [21223 CPT(R)] Introducing [facility]! Dear Marco Antonio Sy: Thank you for requesting a Powervation account. Our records indicate that you already have an active Powervation account. You can access your account anytime at [email or web address] Did you know that you can access your hospital and ER discharge instructions at any time in Powervation? You can also review all of your test results from your hospital stay or ER visit. Additional Information If you have questions, please visit the Frequently Asked Questions section of the Powervation website at [email or web address] Remember, Powervation is NOT to be used for urgent needs. For medical emergencies, dial 911. Now available from your iPhone and Android! Please provide this summary of care documentation to your next provider. Your primary care clinician is listed as [place]. If you have any questions after today's visit, please call [phone].

## 2017-05-04 NOTE — PROGRESS NOTES
Subjective:      Justin Zambrano is a 80 y.o. male is here for his annual pacemaker f/u appt. The patient denies chest pain/ shortness of breath, orthopnea, PND, LE edema, palpitations, syncope, presyncope or fatigue. Doing well.         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 01/29/2016    Diabetic peripheral neuropathy associated with type 2 diabetes mellitus ([address] 01/29/2016    Idiopathic small and large fiber sensory neuropathy 01/29/2016    Dizziness and giddiness 07/30/2015    Memory loss 07/30/2015    SVT (supraventricular tachycardia) ([address] 12/10/2013    Pacemaker 08/21/2013    SSS (sick sinus syndrome) ([facility] 08/06/2013    PVC (premature ventricular contraction) 08/06/2013    A-fib ([address] 08/06/2013    Vertigo 02/16/2012    Vitamin D deficiency 11/30/2009    DM type 2 (diabetes mellitus, type 2) ([address] 10/28/2009    HTN (hypertension) 10/28/2009    Hyperlipidemia 10/28/2009    ED (erectile dysfunction) 10/28/2009      Xavier Whitaker MD  Past Medical History:   Diagnosis Date    Arrhythmia     Arthritis     Carpal tunnel syndrome 2006    CKD (chronic kidney disease) 3/2/2017    DM type 2 (diabetes mellitus, type 2) ([address] 10/28/2009    ED (erectile dysfunction) 10/28/2009    HTN (hypertension) 10/28/2009    Hyperlipidemia 10/28/2009    Neuropathy 10/28/2009    Other ill-defined conditions     hyperlipidemia    Pacemaker     SDH (subdural hematoma) ([address]     Systolic CHF ([address] 19/01/6919    Vertigo 2007      Past Surgical History:   Procedure Laterality Date    HX PACEMAKER      NEUROLOGICAL PROCEDURE UNLISTED  1999    evacuation SDH; At [facility]. Allergies   Allergen Reactions    Lisinopril Swelling     angioedema      Family History   Problem Relation Age of Onset    Stroke Mother     Pneumonia Father     MS Child     Cancer Child      breast    Hearing Impairment Child     negative for cardiac disease  Social History     Social History    Marital status:      Spouse name: N/A    Number of children: N/A    Years of education: N/A     Social History Main Topics    Smoking status: Never Smoker    Smokeless tobacco: Never Used    Alcohol use No    Drug use: No    Sexual activity: Yes     Partners: Female     Birth control/ protection: None     Other Topics Concern    None     Social History Narrative    Retired              Current Outpatient Prescriptions   Medication Sig    valsartan (DIOVAN) 80 mg tablet Take 1 Tab by mouth daily.  furosemide (LASIX) 40 mg tablet Take 1 Tab by mouth daily.  cholecalciferol, vitamin D3, (VITAMIN D3) 4,000 unit cap Take 4,000 Units by mouth daily.  atorvastatin (LIPITOR) 20 mg tablet Take 1 Tab by mouth daily.  metoprolol (LOPRESSOR) 25 mg tablet Take 1 Tab by mouth two (2) times a day.  Blood-Glucose Meter (FREESTYLE INSULINX) misc Test blood sugar once a week    Lancets (FREESTYLE LANCETS) misc Check blood sugar daily and record    glucose blood VI test strips (FREESTYLE INSULINX) strip Test blood sugar daily and record    clotrimazole-betamethasone (LOTRISONE) topical cream APPLY AA BID     No current facility-administered medications for this visit. Vitals:    05/04/17 1016   BP: 110/70   Pulse: 80   Resp: 18   SpO2: 98%   Weight: 136 lb (61.7 kg)   Height: 5' 6\" (1.676 m)       I have reviewed the nurses notes, vitals, problem list, allergy list, medical history, family, social history and medications. Review of Symptoms:    General: Pt denies excessive weight gain or loss.    HEENT: Denies blurred vision, headaches, epistaxis and difficulty swallowing. Respiratory: Denies shortness of breath, SENIOR, wheezing or stridor. Cardiovascular: Denies precordial pain, palpitations, edema or PND  Gastrointestinal: Denies poor appetite, indigestion, abdominal pain or blood in stool  Urinary: Denies dysuria, pyuria  Musculoskeletal: Denies pain or swelling from muscles or joints  Neurologic: Denies tremor, paresthesias, or sensory motor disturbance  Skin: Denies rash, itching or texture change. Psych: Denies depression      Physical Exam:      General: Well developed, in no acute distress. HEENT: Eyes - PERRL, no jvd  Heart:  Normal S1/S2 negative S3 or S4. Regular, no murmur, gallop or rub.   Respiratory: Clear bilaterally x 4, no wheezing or rales  Abdomen:   Soft, non-tender, bowel sounds are active.   Extremities:  No edema, normal cap refill, no cyanosis. Musculoskeletal: No clubbing  Neuro: A&Ox3, speech clear, gait stable. Skin: Skin color is normal. No rashes or lesions.  Non diaphoretic  Vascular: 2+ pulses symmetric in all extremities    Cardiographics    Ekg: V paced, HR 77    [employer] Pacemaker- A paced 85%, RV paced 96%  afib 6% time  4 year battery life    Results for orders placed or performed during the hospital encounter of 04/09/17   EKG, 12 LEAD, INITIAL   Result Value Ref Range    Ventricular Rate 70 BPM    Atrial Rate 72 BPM    P-R Interval 224 ms    QRS Duration 90 ms    Q-T Interval 422 ms    QTC Calculation (Bezet) 455 ms    Calculated R Axis 24 degrees    Calculated T Axis -63 degrees    Diagnosis       Electronic atrial pacemaker  Nonspecific ST and T wave abnormality    Confirmed by Vishal Machuca MD, [facility] ([id number]) on 4/9/2017 3:13:12 PM     Results for orders placed or performed in visit on 07/22/13   HOLTER MONITOR, 24 HOURS    Narrative    ECG Monitor/24 hours, Complete    Reason for Holter Monitor  PVC's    Heartbeat    Slowest 43  Average 61  Fastest  125        Results:   Underlying Rhythm: Normal sinus rhythm      Atrial Arrhythmias: premature atrial contractions; occasional and  severe bradycardia            AV Conduction: normal    Ventricular Arrhythmias: premature ventricular contractions;  frequent and ventricular couplets     ST Segment Analysis:normal     Symptom Correlation:  None reported    Comment:   Sinus rhythm with profound daytime bradycardia to the 30s and  ventricular bigeminy. Clinical correlation advised. Mireya Olmos MD, Sam Needs      Results for orders placed or performed in visit on 11/30/09   AMB POC EKG ROUTINE W/ 12 LEADS, INTER & REP    Impression    Sinus arrhythmia at approximately 65 beats/min patient with very  frequent PACs. No other acute ST-T wave changes. Lab Results   Component Value Date/Time    WBC 6.6 04/11/2017 02:29 AM    HGB 12.4 04/11/2017 02:29 AM    HCT 37.9 04/11/2017 02:29 AM    PLATELET 837 85/95/1413 02:29 AM    MCV 81.7 04/11/2017 02:29 AM      Lab Results   Component Value Date/Time    Sodium 136 04/11/2017 02:29 AM    Potassium 3.9 04/11/2017 02:29 AM    Chloride 100 04/11/2017 02:29 AM    CO2 27 04/11/2017 02:29 AM    Anion gap 9 04/11/2017 02:29 AM    Glucose 85 04/11/2017 02:29 AM    BUN 33 04/11/2017 02:29 AM    Creatinine 2.33 04/11/2017 02:29 AM    BUN/Creatinine ratio 14 04/11/2017 02:29 AM    GFR est AA 32 04/11/2017 02:29 AM    GFR est non-AA 26 04/11/2017 02:29 AM    Calcium 8.7 04/11/2017 02:29 AM    Bilirubin, total 0.8 04/09/2017 04:49 AM    AST (SGOT) 22 04/09/2017 04:49 AM    Alk. phosphatase 109 04/09/2017 04:49 AM    Protein, total 7.3 04/09/2017 04:49 AM    Albumin 3.5 04/09/2017 04:49 AM    Globulin 3.8 04/09/2017 04:49 AM    A-G Ratio 0.9 04/09/2017 04:49 AM    ALT (SGPT) 34 04/09/2017 04:49 AM         Assessment:     Assessment:        ICD-10-CM ICD-9-CM    1. SSS (sick sinus syndrome) ([facility]) I49.5 427.81    2. Chronic systolic congestive heart failure (HCC) I50.22 428.22      428.0    3.  Essential hypertension I10 401.9 AMB POC EKG ROUTINE W/ 12 LEADS, INTER & REP   4. Paroxysmal atrial fibrillation (HCC) I48.0 427.31    5. Mixed hyperlipidemia E78.2 272.2    6. Type 2 DM with CKD stage 3 and hypertension (HCC) E11.22 250.40     I12.9 403.90     N18.3 585.3      Orders Placed This Encounter    AMB POC EKG ROUTINE W/ 12 LEADS, INTER & REP     Order Specific Question:   Reason for Exam:     Answer:   routine    clotrimazole-betamethasone (LOTRISONE) topical cream     Sig: APPLY AA BID     Refill:  0        Plan:   Mr Corrinne Maine is here today for his annual pacemaker f/u appt. He denies cardiac complaints. BP is normotensive. Weight is stable. CHF well compensated. Afib noted on device interrogation today. Not a candidate for anticoag d/t hx of subdural bleed and age/fall risk. Continue medical management for CHF, afib, HTN, DM, and hyperlipidemia. F/U in a year. Thank you for allowing me to participate in Shelby Tello 's care.     Deyanira Roman MD, Glenroy Kwon

## 2017-05-10 NOTE — PROGRESS NOTES
Heart Failure liaison contacted the patient by telephone to perform CHF bundle Follow Up. Verified  and address as identifiers.       Spoke to patient regarding the following:    Have you been to an ER/Hospital since discharge from [facility]?   no    Have you followed up with PCP? Yes ; Ashly Copeland    Transportation:  Self and son  Daily Weight: 136.0lb  stable  Zone: green  Signs/Symptoms: Swelling denies; SOB denies; how many pillows to sleep? one  Diet: low sodium, diabetic  Exercise: walks. Medications Reconciled at this time:  yes  Home health?: no    Patient reminded that there is a cardiologist on call 24 hours a day / 7 days a week (M-F 5pm to 8am and from Friday 5pm until Monday 8a for the weekend) should the patient have questions or concerns. Patient reminded to call 911 if situation is emergent or patient feels the situation is emergent. Provided pt with [facility] office telephone number, [phone]. Pt has follow up with PCP on nd with cardiology on 17.

## 2017-05-22 NOTE — MR AVS SNAPSHOT
Visit Information Date & Time Provider Department Dept. Phone Encounter #  
 5/22/2017  2:00 PM Elizabeth Austin, [address] [facility] [phone] Follow-up Instructions Routing History Follow-up and Disposition History Your Appointments 8/22/2017 10:30 AM  
ROUTINE CARE with Kodak Dunaway MD  
[facility] [address] [facility]) Appt Note: 4 mon f/u for dm  
 [facility] [address]  
[phone]  
  
   
 [place] [address]  [address]  
  
    
 11/21/2017  9:00 AM  
PACEMAKER with PACEMAKER, [facility] [address]) Appt Note: 6MO BSC DCPM; per Dr. Laura Mazariegos no cp,  
 [address]  
[phone] [address]  
  
    
 11/21/2017  9:15 AM  
6 MONTH with Tamra Aquino MD  
[facility] [address] [facility]) Appt Note: per Dr. Laura Mazariegos no cp,  
 [address]  
[phone] [address] Upcoming Health Maintenance Date Due Pneumococcal 65+ Low/Medium Risk (2 of 2 - PPSV23) 10/15/2015 MEDICARE YEARLY EXAM 6/9/2016 EYE EXAM RETINAL OR DILATED Q1 11/18/2016 HEMOGLOBIN A1C Q6M 5/3/2017 INFLUENZA AGE 9 TO ADULT 8/1/2017 MICROALBUMIN Q1 11/3/2017 LIPID PANEL Q1 11/3/2017 GLAUCOMA SCREENING Q2Y 11/18/2017 FOOT EXAM Q1 3/2/2018 DTaP/Tdap/Td series (2 - Td) 11/8/2026 Allergies as of 5/22/2017  Review Complete On: 5/22/2017 By: Elizabeth Austin MD  
  
 Severity Noted Reaction Type Reactions Lisinopril High 06/22/2009   Side Effect Swelling  
 angioedema Current Immunizations  Reviewed on 10/9/2015 Name Date Influenza Vaccine 10/1/2015, 10/3/2014, 10/3/2014, 9/6/2013 Influenza Vaccine (Quad) PF 11/3/2016 Pneumococcal Vaccine (Unspecified Type) 10/15/2010 TD Vaccine 5/6/1977 Tdap 11/8/2016 10:51 AM, 6/9/2015 Zoster Vaccine, Live 10/1/2015 Not reviewed this visit You Were Diagnosed With   
  
 Codes Comments Chronic systolic HF (heart failure) (HCC)    -  Primary ICD-10-CM: N83.87 ICD-9-CM: 428.22 Essential hypertension     ICD-10-CM: I10 
ICD-9-CM: 401.9 SSS (sick sinus syndrome) (HCC)     ICD-10-CM: I49.5 ICD-9-CM: 427.81 Atrial fibrillation, unspecified type ([facility] [address]     ICD-10-CM: I48.91 
ICD-9-CM: 427.31 Mixed hyperlipidemia     ICD-10-CM: E78.2 ICD-9-CM: 272.2 Type 2 DM with CKD stage 3 and hypertension (HCC)     ICD-10-CM: E11.22, I12.9, N18.3 ICD-9-CM: 250.40, 403.90, 585.3 Pacemaker     ICD-10-CM: Z95.0 ICD-9-CM: V45.01 Vitals BP Pulse Resp Height(growth percentile) Weight(growth percentile) SpO2  
 90/40 (BP 1 Location: Right arm, BP Patient Position: Standing) 75 16 5' 6\" (1.676 m) 137 lb (62.1 kg) 98% BMI Smoking Status 22.11 kg/m2 Never Smoker Vitals History BMI and BSA Data Body Mass Index Body Surface Area  
 22.11 kg/m 2 1.7 m 2 Preferred Pharmacy Pharmacy Name Phone [facility] [address]  [facility] [phone] Your Updated Medication List  
  
   
This list is accurate as of: 5/22/17  2:30 PM.  Always use your most recent med list.  
  
  
  
  
 atorvastatin 20 mg tablet Commonly known as:  LIPITOR Take 1 Tab by mouth daily. Blood-Glucose Meter Misc Commonly known as:  FREESTYLE INSULINX Test blood sugar once a week  
  
 cholecalciferol (vitamin D3) 4,000 unit Cap Commonly known as:  VITAMIN D3 Take 4,000 Units by mouth daily. clotrimazole-betamethasone topical cream  
Commonly known as:  LOTRISONE  
APPLY AA BID  
  
 furosemide 40 mg tablet Commonly known as:  LASIX Take 1 Tab by mouth daily. glucose blood VI test strips strip Commonly known as:  FREESTYLE INSULINX Test blood sugar daily and record Lancets Misc Commonly known as:  FREESTYLE LANCETS Check blood sugar daily and record  
  
 metoprolol tartrate 25 mg tablet Commonly known as:  LOPRESSOR Take 1 Tab by mouth two (2) times a day. valsartan 80 mg tablet Commonly known as:  DIOVAN Take 1 Tab by mouth daily. We Performed the Following AMB POC EKG ROUTINE W/ 12 LEADS, INTER & REP [72625 CPT(R)] MAGNESIUM [id number] CPT(R)] METABOLIC PANEL, BASIC [05268 CPT(R)] Introducing [facility]! Dear Rose Simons: Thank you for requesting a [employer] account. Our records indicate that you already have an active [employer] account. You can access your account anytime at [email or web address] Did you know that you can access your hospital and ER discharge instructions at any time in [employer]? You can also review all of your test results from your hospital stay or ER visit. Additional Information If you have questions, please visit the Frequently Asked Questions section of the [employer] website at [email or web address] Remember, [employer] is NOT to be used for urgent needs. For medical emergencies, dial 911. Now available from your iPhone and Android! Please provide this summary of care documentation to your next provider. Your primary care clinician is listed as [place]. If you have any questions after today's visit, please call [phone].

## 2017-05-22 NOTE — PROGRESS NOTES
Subjective/HPI:     Truong Martin is a 80 y.o. male is here for f/u appt. He reports that he has been weighing himself at home and his weight has been stable. He had an episode of dizziness this morning about 2 hrs after he woke up. He was standing when it started. He sat down and it went away. He wasn't dizzy when we checked his BP today. The patient denies chest pain/ shortness of breath, orthopnea, PND, LE edema, palpitations, syncope, or fatigue. PCP Provider  Hawk Cuadra MD  Past Medical History:   Diagnosis Date    Arrhythmia     Arthritis     Carpal tunnel syndrome 2006    CKD (chronic kidney disease) 3/2/2017    DM type 2 (diabetes mellitus, type 2) ([address] 10/28/2009    ED (erectile dysfunction) 10/28/2009    HTN (hypertension) 10/28/2009    Hyperlipidemia 10/28/2009    Neuropathy 10/28/2009    Other ill-defined conditions     hyperlipidemia    Pacemaker     SDH (subdural hematoma) ([address]     Systolic CHF ([address] 95/67/1387    Vertigo 2007      Past Surgical History:   Procedure Laterality Date    HX PACEMAKER      NEUROLOGICAL PROCEDURE UNLISTED  1999    evacuation SDH; At [facility]. Allergies   Allergen Reactions    Lisinopril Swelling     angioedema      Family History   Problem Relation Age of Onset    Stroke Mother     Pneumonia Father     MS Child     Cancer Child      breast    Hearing Impairment Child       Current Outpatient Prescriptions   Medication Sig    valsartan (DIOVAN) 80 mg tablet Take 1 Tab by mouth daily.  furosemide (LASIX) 40 mg tablet Take 1 Tab by mouth daily.  cholecalciferol, vitamin D3, (VITAMIN D3) 4,000 unit cap Take 4,000 Units by mouth daily.  atorvastatin (LIPITOR) 20 mg tablet Take 1 Tab by mouth daily.  metoprolol (LOPRESSOR) 25 mg tablet Take 1 Tab by mouth two (2) times a day.     Blood-Glucose Meter (FREESTYLE INSULINX) misc Test blood sugar once a week    Lancets (FREESTYLE LANCETS) misc Check blood sugar daily and record    glucose blood VI test strips (FREESTYLE INSULINX) strip Test blood sugar daily and record    clotrimazole-betamethasone (LOTRISONE) topical cream APPLY AA BID     No current facility-administered medications for this visit. Vitals:    05/22/17 1348 05/22/17 1358 05/22/17 1359   BP: 104/60 90/40 90/40   Pulse: 82 83 75   Resp: 16     SpO2: 98%     Weight: 137 lb (62.1 kg)     Height: 5' 6\" (1.676 m)       Social History     Social History    Marital status:      Spouse name: N/A    Number of children: N/A    Years of education: N/A     Occupational History    Not on file. Social History Main Topics    Smoking status: Never Smoker    Smokeless tobacco: Never Used    Alcohol use No    Drug use: No    Sexual activity: Yes     Partners: Female     Birth control/ protection: None     Other Topics Concern    Not on file     Social History Narrative    Retired                I have reviewed the nurses notes, vitals, problem list, allergy list, medical history, family, social history and medications. Review of Symptoms:    General: Pt denies excessive weight gain or loss. Pt is able to conduct ADL's  HEENT: Denies blurred vision, headaches, epistaxis and difficulty swallowing. Respiratory: Denies shortness of breath, SENIOR, wheezing or stridor. Cardiovascular: Denies precordial pain, palpitations, edema or PND  Gastrointestinal: Denies poor appetite, indigestion, abdominal pain or blood in stool  Urinary: Denies dysuria, pyuria  Musculoskeletal: Denies pain or swelling from muscles or joints  Neurologic: Denies tremor, paresthesias, +dizziness  Skin: Denies rash, itching or texture change. Psych: Denies depression        Physical Exam:      General: In no acute distress, cooperative and alert  HEENT: No carotid bruits, no JVD, trach is midline. Neck Supple, PEERL, EOM intact. Heart:  Normal S1/S2 negative S3 or S4.  Regular, no murmur, gallop or rub.   Respiratory: Clear bilaterally x 4, no wheezing or rales  Abdomen:   Soft, non-tender, no masses, bowel sounds are active.   Extremities:  No edema, normal cap refill, no cyanosis, atraumatic. Neuro: A&Ox3, speech clear, gait stable. Skin: Skin color is normal. No rashes or lesions. Non diaphoretic  Vascular: 2+ pulses symmetric in all extremities    Cardiographics    ECG: AV paced      Results for orders placed or performed during the hospital encounter of 04/09/17   EKG, 12 LEAD, INITIAL   Result Value Ref Range    Ventricular Rate 70 BPM    Atrial Rate 72 BPM    P-R Interval 224 ms    QRS Duration 90 ms    Q-T Interval 422 ms    QTC Calculation (Bezet) 455 ms    Calculated R Axis 24 degrees    Calculated T Axis -63 degrees    Diagnosis       Electronic atrial pacemaker  Nonspecific ST and T wave abnormality    Confirmed by Conchita Ansari MD, Bipin Shay ([id number]) on 4/9/2017 3:13:12 PM     Results for orders placed or performed in visit on 07/22/13   HOLTER MONITOR, 24 HOURS    Narrative    ECG Monitor/24 hours, Complete    Reason for Holter Monitor  PVC's    Heartbeat    Slowest 43  Average 61  Fastest  125        Results:   Underlying Rhythm: Normal sinus rhythm      Atrial Arrhythmias: premature atrial contractions; occasional and  severe bradycardia            AV Conduction: normal    Ventricular Arrhythmias: premature ventricular contractions;  frequent and ventricular couplets     ST Segment Analysis:normal     Symptom Correlation:  None reported    Comment:   Sinus rhythm with profound daytime bradycardia to the 30s and  ventricular bigeminy. Clinical correlation advised. Mireya Olmos MD, Sam Needs      Results for orders placed or performed in visit on 11/30/09   AMB POC EKG ROUTINE W/ 12 LEADS, INTER & REP    Impression    Sinus arrhythmia at approximately 65 beats/min patient with very  frequent PACs. No other acute ST-T wave changes.          Cardiology Labs:  Lab Results   Component Value Date/Time    Cholesterol, total 210 11/03/2016 10:32 AM    HDL Cholesterol 62 11/03/2016 10:32 AM    LDL, calculated 134 11/03/2016 10:32 AM    Triglyceride 71 11/03/2016 10:32 AM    CHOL/HDL Ratio 2.7 10/12/2010 09:21 AM       Lab Results   Component Value Date/Time    Sodium 136 04/11/2017 02:29 AM    Potassium 3.9 04/11/2017 02:29 AM    Chloride 100 04/11/2017 02:29 AM    CO2 27 04/11/2017 02:29 AM    Anion gap 9 04/11/2017 02:29 AM    Glucose 85 04/11/2017 02:29 AM    BUN 33 04/11/2017 02:29 AM    Creatinine 2.33 04/11/2017 02:29 AM    BUN/Creatinine ratio 14 04/11/2017 02:29 AM    GFR est AA 32 04/11/2017 02:29 AM    GFR est non-AA 26 04/11/2017 02:29 AM    Calcium 8.7 04/11/2017 02:29 AM    AST (SGOT) 22 04/09/2017 04:49 AM    Alk.  phosphatase 109 04/09/2017 04:49 AM    Protein, total 7.3 04/09/2017 04:49 AM    Albumin 3.5 04/09/2017 04:49 AM    Globulin 3.8 04/09/2017 04:49 AM    A-G Ratio 0.9 04/09/2017 04:49 AM    ALT (SGPT) 34 04/09/2017 04:49 AM           Assessment:     Assessment:     Syl Navarrete was seen today for chf.    Diagnoses and all orders for this visit:    Chronic systolic HF (heart failure) (HCC)  -     AMB POC EKG ROUTINE W/ 12 LEADS, INTER & REP  -     METABOLIC PANEL, BASIC  -     MAGNESIUM    Essential hypertension  -     AMB POC EKG ROUTINE W/ 12 LEADS, INTER & REP  -     METABOLIC PANEL, BASIC  -     MAGNESIUM    SSS (sick sinus syndrome) ([facility])  -     AMB POC EKG ROUTINE W/ 12 LEADS, INTER & REP  -     METABOLIC PANEL, BASIC  -     MAGNESIUM    Atrial fibrillation, unspecified type ([facility])  -     AMB POC EKG ROUTINE W/ 12 LEADS, INTER & REP  -     METABOLIC PANEL, BASIC  -     MAGNESIUM    Mixed hyperlipidemia  -     AMB POC EKG ROUTINE W/ 12 LEADS, INTER & REP  -     METABOLIC PANEL, BASIC  -     MAGNESIUM    Type 2 DM with CKD stage 3 and hypertension ([facility])  -     AMB POC EKG ROUTINE W/ 12 LEADS, INTER & REP  -     METABOLIC PANEL, BASIC  -     MAGNESIUM    Pacemaker  -     AMB POC EKG ROUTINE W/ 12 LEADS, INTER & REP  -     METABOLIC PANEL, BASIC  -     MAGNESIUM        ICD-10-CM ICD-9-CM    1. Chronic systolic HF (heart failure) ([facility]) I50.22 428.22 AMB POC EKG ROUTINE W/ 12 LEADS, INTER & REP      METABOLIC PANEL, BASIC      MAGNESIUM   2. Essential hypertension I10 401.9 AMB POC EKG ROUTINE W/ 12 LEADS, INTER & REP      METABOLIC PANEL, BASIC      MAGNESIUM   3. SSS (sick sinus syndrome) ([facility]) I49.5 427.81 AMB POC EKG ROUTINE W/ 12 LEADS, INTER & REP      METABOLIC PANEL, BASIC      MAGNESIUM   4. Atrial fibrillation, unspecified type ([facility]) I48.91 427.31 AMB POC EKG ROUTINE W/ 12 LEADS, INTER & REP      METABOLIC PANEL, BASIC      MAGNESIUM   5. Mixed hyperlipidemia E78.2 272.2 AMB POC EKG ROUTINE W/ 12 LEADS, INTER & REP      METABOLIC PANEL, BASIC      MAGNESIUM   6. Type 2 DM with CKD stage 3 and hypertension ([facility]) E11.22 250.40 AMB POC EKG ROUTINE W/ 12 LEADS, INTER & REP    B03.1 864.42 METABOLIC PANEL, BASIC    V14.6 585.3 MAGNESIUM   7. Pacemaker Z95.0 V45.01 AMB POC EKG ROUTINE W/ 12 LEADS, INTER & REP      METABOLIC PANEL, BASIC      MAGNESIUM     Orders Placed This Encounter    METABOLIC PANEL, BASIC    MAGNESIUM    AMB POC EKG ROUTINE W/ 12 LEADS, INTER & REP     Order Specific Question:   Reason for Exam:     Answer:   routine        Plan:     Patient presents today for f/u appt, denies worsening sob/SENIOR, edema. He had an episode of dizziness this am, he was not significantly orthostatic today nor symptomatic with BP check. CHF compensated. I will evaluate with a BMP and Mag, further adjustment of meds based on lab results. At this time continue current care and f/u in 3 months.     Rosario Spencer MD

## 2017-06-02 NOTE — PROGRESS NOTES
Heart Failure liaison contacted the patient by telephone to perform CHF bundle Follow Up. Verified  and address as identifiers.       Spoke to patient regarding the following:    Have you been to an ER/Hospital since discharge from [facility]?  no    Have you followed up with PCP? no; (Provider name and date if possible)    Transportation:  Self and son  Daily Weight:136.0lb (increase or decrease)  Zone: green  Signs/Symptoms: Swelling denies; SOB denies; how many pillows to sleep? 1  Diet: diabetic and low sodium  Exercise: walks   Patient-centered Goal walk more  Medications Reconciled at this time:  no  Home health?: no    Patient reminded that there is a cardiologist on call 24 hours a day / 7 days a week (M- 5pm to 8am and from Friday 5pm until Monday 8a for the weekend) should the patient have questions or concerns. Patient reminded to call 911 if situation is emergent or patient feels the situation is emergent. Provided pt with [facility] office telephone number, [phone]. Pt has follow up with PCP on 17 and with cardiology on 17.

## 2017-06-13 NOTE — PROGRESS NOTES
Patient returned call to let me know his weight today was 136.0lb. Patient denies any shortness of breath, and denies any edema in hands, feet and ankles. Advised to use the 24/7 line when needed. Discussed going out in extreme heat and taking it easy, and fluid intake. advised patient will follow up next week.

## 2017-06-21 NOTE — PROGRESS NOTES
Patient returned call with his daily weight.  today's weight was 136.0lb  No increase. Patient will continue to weigh daily. Patient denies any edema in hands, feet, or ankles or shortness of breath. Advised to watch out for increased heat and to drink plenty fluids. Patient denies any dizziness and states he will call with any questions or concerns. Will continue to follow weekly.

## 2017-06-30 NOTE — PROGRESS NOTES
Spoke to patient using 2 patient identifiers. Per Stefany Brambila NP, kidney function remains stable. Sodium was a tiny bit elevated so be sure to drink some water to replenish what he needs to have in his bloodstream.  Electrolytes are normal otherwise and continue with current treatment. Patient verbalized understanding with no further questions asked.

## 2017-06-30 NOTE — PROGRESS NOTES
Please let pt know that his kidney function remains stable. His sodium was a tiny bit elevated, make sure that he does drink some water to replenish what he needs to have in his bloodstream. His electrolytes are normal otherwise.  Cont with current tx

## 2017-07-06 NOTE — PROGRESS NOTES
Spoke to patient in regards to weekly bundle check. Patient states he had a quiet fourth of July. Patient daily weight was 136.0lb. Patient denies any swelling in extremities, or shortness of breath. Patient recent labs were normal, was advised to increase fluids, which patient states he has started. There has been a discrepancy in patients upcoming pacemaker appointment was scheduled for August but shows as cancelled, and rescheduled for November, patient was unaware. I sent a message to [facility] in scheduling for a reschedule for August, patient does need to be seen in August and to call him with the new appointment.

## 2017-07-13 NOTE — PROGRESS NOTES
NN contacted the patient by telephone to perform CHF follow Up. Spoke to patient regarding the following:    Have you been to an ER/Hospital since discharge from [facility]? No      Have you followed up with PCP? yes; Dr Jaxson Mayen    Transportation:  sons  Daily Weight: 136.0lb no change  Zone: green   Signs/Symptoms: Swelling denies; SOB denies; how many pillows to sleep? two  Diet: low sodium  Exercise: patient walks daily. Medications Reconciled at this time:  no  Home health?: no      Patient reminded that there is a cardiologist on call 24 hours a day / 7 days a week (M-F 5pm to 8am and from Friday 5pm until Monday 8a for the weekend) should the patient have questions or concerns. Patient reminded to call 911 if situation is emergent or patient feels the situation is emergent. Provided pt with cardiology office telephone number. Pt has follow up with PCP on 9/6/17 and with cardiology on 8/15/17. Advised patient that the weekly calls will now be every other week, that patient can certainly call anytime if any questions or concerns.

## 2017-07-24 PROBLEM — I63.9 CVA (CEREBRAL VASCULAR ACCIDENT) (HCC): Status: ACTIVE | Noted: 2017-01-01

## 2017-07-24 NOTE — ED TRIAGE NOTES
Brought in via squad, pt. Unresponsive, gurgling when EMS got there. Pt. blinkking on arrival. Unable to follow commands.

## 2017-07-25 PROBLEM — T17.908A ASPIRATION INTO AIRWAY: Status: ACTIVE | Noted: 2017-01-01

## 2017-07-25 NOTE — PROGRESS NOTES
0110: Patient transported up from ED with a nurse and tech. Patient transferred into room 2524. Patient very lethargic. Primary Nurse [place] Holt. Nan Moritz, RN and [place], RN performed a dual skin assessment on this patient No impairment noted  Trever score is 13      0125: Family at bedside. 0130: Spoke to Dr. Eyal Maciel regarding crackles bilaterally when ausculting. Unable to give diuretic due systolic BP 25-48G. 0150: Family left bedside. 0220: Orders received from Dr. Rocío Delacruz to place patient on BIPAP after ABG drawn. 0235: Patient placed on BIPAP.   0245: BIPAP removed. Patient vomited while BIPAP was on. Suctioned patient's mouth. 0300: Called Son [employer]) informed him of decrease in O2 and BP. Son to come to sit with patient. 46: Family at bedside. 5105: Reassessment completed. Not able to draw labs off IVs.   0730: Bedside and verbal report given to Kevyn Marrero, [facility].

## 2017-07-25 NOTE — WOUND CARE
Pressure Ulcer Prevention In basket Alert Received for Trever < 14 (moderate risk).      Suggested Care Plan/Interventions for Nursing  1. Complete Trever Pressure Ulcer Risk Scale and use sub scores to identify appropriate interventions. 2. Perform Assessment: skin, changes in LOC, visual cues for pain, monitor skin under medical devices  3. Respond to Reduced Sensory Perception: changes in LOC, check visual cues for pain, float heels, suspension boots, pressure redistribution bed/mattress/chair cushion, turning and reposition approximately every 2 hours (pillows & wedges), pad between skin to skin, turn & reposition  4. Manage Moisture: absorbent under pads, internal / external urinary device, internal /  external fecal device, minimize layers, contain wound drainage, access need for specialty bed, limit adult briefs, maintain skin hydration (lotion/cream), moisture barrier, offer toileting every hour  5. Promote Activity: increase time out of bed, chair cushion, PT/OT evaluation, trapeze to reposition, pressure redistribution bed/mattress/chair  6. Address Reduced Mobility: float heels / suspension boot, HOB 30 degrees or less, pressure redistribution bed/mattress/cushion, PT / OT evaluation, turn and reposition approximately every 2 hours (pillows & wedges)  7. Promote Nutrition: document food / fluid / supplement intake, encourage/assist with meals as needed  8. Reduce Friction and Shear: transferring/repositioning devices (lift/draw sheet), lift team/ patient mobility team, feet elevated on foot rest, minimize layers, foam dressing / transparent film / skin sealants, protective barrier creams and emollients, transfer aides (board, Jt lift, ceiling lift, stand assist), HOB 30 degrees or less, trapeze to reposition.   Wound Care Team

## 2017-07-25 NOTE — PROGRESS NOTES
PT note:    Orders received and acknowledged. Chart reviewed and spoke with nursing. Per nursing, patient is not appropriate for PT evaluation as he is unresponsive and not following commands s/p TPA. Will defer PT evaluation and continue to follow once medically appropriate.     Cole Huang, PT, DPT

## 2017-07-25 NOTE — PROGRESS NOTES
Problem: Ischemic Stroke: 0-24 hours  Goal: Activity/Safety  Outcome: Not Progressing Towards Goal  Only moving left side of body spontaneously, not to command, right side of body flaccid  Goal: Respiratory  Outcome: Not Progressing Towards Goal  Patient requiring Venti mask and occasionally desats into 80's and requires suctioning which improved his sat.   Goal: *Absence of Signs of Aspiration on Current Diet  Outcome: Not Progressing Towards Goal  Patient remains NPO and it is unknown currently if he aspirated when he had an episode of emesis last night  Goal: *Ability to perform ADLs and demonstrates progressive mobility and function  Outcome: Not Progressing Towards Goal  Patient very limited in movement of his extremeties, no movement to right side of body and only weak spontaneous to left side without any command following

## 2017-07-25 NOTE — PROGRESS NOTES
S: Mr. Amabr Loja. was seen by me today during rounds. At this time, he is lying in bed. He is awake, but not verbalizing. Not moving R side. ROS not obtainable at this time. O: Blood pressure 93/50, pulse (!) 117, temperature 99.9 °F (37.7 °C), resp. rate 25, height 5' 9\" (1.753 m), weight 142 lb 10.2 oz (64.7 kg), SpO2 94 %. Lungs: CTAB. Heart: RRR. Abd: S, NT, ND, BS present. Extremities: Warm. Neuro: R hemiparesis. Recent Results (from the past 12 hour(s))   GLUCOSE, POC    Collection Time: 07/25/17  6:05 AM   Result Value Ref Range    Glucose (POC) 167 (H) 65 - 100 mg/dL    Performed by [address] [place] [address], POC    Collection Time: 07/25/17 11:53 AM   Result Value Ref Range    Glucose (POC) 172 (H) 65 - 100 mg/dL    Performed by Bhavin Channel         A / P: Active Problems:  1. CVA (cerebral vascular accident) ([facility] (7/24/2017): He is in ICU, s/p tPA. Repeat CT pending. Neurology following. 2. PAF (paroxysmal atrial fibrillation) ([facility] (8/6/2013): Rate has been high today. Cardiology following. 3. Aspiration into airway (7/25/2017) with hypoxic resp failure: On oxygen. 4. DM type 2 (diabetes mellitus, type 2) ([facility] (10/28/2009): SSI. Has needed D50 on admission due to hypoglycemia. 5. CKD (chronic kidney disease) (3/2/2017)  6. HTN (hypertension) (10/28/2009): BP running low normal today. Hold BP meds. 7. DNR / DNI. Palliative care to meet with family tomorrow.

## 2017-07-25 NOTE — PROGRESS NOTES
HOspitalist Addendum  -son, Josh, in Room. He states patient is a DNR per his discussion with father. The paperwork the son brings identifies the son as Josh and states the son will make decisions based on discussion he has had with father. Further I note the note Palliative Care in 4/2017 whereby the patient stated he was a DNR/DNI. We will therefore respect these wishes. DNR for now. Peyton Rodriguez MD    Addendum  -given drop in O2 and that he is a DNR/DNI we opted to attempt BIPAP to improve oxygenation. Upon initiation patient had emesis spell. Suctioning performed, bipap removed. Family and I have discussed overall poor prognosis.    Peyton Rodriguez MD

## 2017-07-25 NOTE — PROGRESS NOTES
Palliative Medicine     Palliative team received consult for end stage disease. This LCSW visited Pt and met his spouse, Mrs. Sharon Suero at bedside. Spoke with Pt's son/mpoa via the phone to introduce Palliative Med's role and set mtg time. Pt's son recalls meeting with Dr. Rosalie Saleem MD when Pt had ACP conversation in April 2017. Spoke with RAUDEL Bustillo. Pt is member of [facility]. Couple met at [facility]. Spouse said they have not informed  yet but will likely do so in time. Plan:  Palliative team family meeting led by Dr. Bernice Bearden for Wed. At 11 am.     Thank you for including Palliative Medicine in Ms. Pearce's care.       Shar Valdivia, [address] ([id number])  Work cell: [phone]

## 2017-07-25 NOTE — ED NOTES
After checking back at past notes from past visits, Dr. Ruma Schmidt found pt to be DNR - Son agrees. Paperwork at sons home. Son to retrieve paperwork.

## 2017-07-25 NOTE — PROGRESS NOTES
Speech pathology note  Reviewed chart and discussed case with RN who reports patient not appropriate for swallowing evaluation secondary to respiratory status and level of alertness. Will follow up tomorrow for swallowing evaluation. Thank you.     Rahel Dawson., CCC-SLP

## 2017-07-25 NOTE — PROGRESS NOTES
7/25/2017    INTENSIVIST PROGRESS NOTE:     Patient seen and evaluated. Admitted overnight with acute stroke, s/p TPA. Still with dense right hemiparesis. Had some respiratory distress but he vomited while on BiPAP. He can not provide any additional history at this time.      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

## 2017-07-25 NOTE — PROGRESS NOTES
0715-Bedside and Verbal shift change report given to Marylen Deis, RN (oncoming nurse) by TRUNG Keys, RN (offgoing nurse). Report included the following information SBAR, Kardex, ED Summary, Procedure Summary, Intake/Output, MAR, Recent Results and Cardiac Rhythm Paced. 0800-Patient opens eyes spontaneously, but not to command. He is midly drowsy, but not lethargic anymore as reported by night shift. Eyes slightly deviated to left and are not able to cross midline and look left. Right arm and leg flaccid and left extremeties move spontaneously/purposefully, but not to command. Patient often seen trying to removed 100% NRM which was changed to a venti mask since he was satting 100%. Will decrease O2 as patient tolerates. His skin in cool, but he has good pulses and no edema. Condom cath in place and draining. VS WDL other than his respiratory rate which is in the upper 30's. BiPAP was attempted last night, but patient vomited into the mask and it had to be removed for airway protection. Patient's son is the Josh and his number has been verified. His wife of one year is at bedside and seems to be a poor historian, but is calm and cooperative. Neuro, Cardiology, Palliative consults ordered for today. Patient not currently appropriate of PT/OT/ST given his mental state, inability to follow commands, and his frequent desatuation on ventimask. 0830-Database completed by Jasen Ray RN.    0900-Patient down to 35% 8L Ventimask. 1115-Dr. Mao at bedside for consult. 1200-Pacemaker interrogated per Dr. Anu Hernandez. 1400-Dr. Ivy at bedside. 1600-Dr. Wallace aware that patient's HR is fluctuating between 80-130paced. As HR gets higher, his BP is dropping. No changes made at this time. 1630-Bill Paiz at bedside. Aware of fluctuating HR and updated on patient's status.

## 2017-07-25 NOTE — PROGRESS NOTES
Stroke Education provided to spouse/SO and son (mPOA) and the following topics were discussed    1. Patients personal risk factors for stroke are hypertension, diabetes mellitus, CHF and Other previous SDH    2. Warning signs of Stroke:        * Sudden numbness or weakness of the face, arm or leg, especially on one side of          The body            * Sudden confusion, trouble speaking or understanding        * Sudden trouble seeing in one or both eyes        * Sudden trouble walking, dizziness, loss of balance or coordination        * Sudden severe headache with no known cause      3. Importance of activation Emergency Medical Services ( 9-1-1 ) immediately if experience any warning signs of stroke. 4. Be sure and schedule a follow-up appointment with your primary care doctor or any specialists as instructed. 5. You must take medicine every day to treat your risk factors for stroke. Be sure to take your medicines exactly as your doctor tells you: no more, no less. Know what your medicines are for , what they do. Anti-thrombotics /anticoagulants can help prevent strokes. You are taking the following medicine(s)      6.  Smoking and second-hand smoke greatly increase your risk of stroke, cardiovascular disease and death. Smoking history never    7. Information provided was [facility] Stroke Education Binder, Stroke Handouts or Verbal Education    8. Documentation of teaching completed in Patient Education Activity and on Care Plan with teaching response noted?   yes

## 2017-07-25 NOTE — CARDIO/PULMONARY
CP REHAB NOTE    Chart Review: Patient admitted for CVA. Suspect acute left MCA stroke. Medical History: CKD, HTN, pacemaker, CHF  EF 50-55%, 4/10/2017  Non Smoker    Patient has a poor prognosis and is a DNR/DI. Teaching deferred.

## 2017-07-25 NOTE — H&P
Hospitalist Admission Note    NAME: Tyesha Cosme. :  1925   MRN:  [id number]     Date/Time:  2017 10:39 PM    Patient PCP: Syed Bryant MD  ________________________________________________________________________    Given the patient's current clinical presentation, I have a high level of concern for decompensation if discharged from the ED. Complex decision making was performed which includes reviewing the patient's available past medical records, laboratory results, and Xray films. I have also directly communicated my plan and discussed this case with the involved ED physician. My assessment of this patient's clinical condition and my plan of care is as follows:    Assessment / Plan:  Strongly suspect Acute left MCA stroke causing acute encephalopathy  Suspect aspirating actively causing Acute hypoxic failure  Hypotension  -following tpa orderset  -repeat CT head tomorrow after 12noon  -neurology to see early this AM  -very high risk for deterioration  -strongly suspect will need bipap before too long  -hypotension concerning - IVF NS to try and bring back to >110 if able  -goal bp will be 140 if possible for now then bring down in AM if tolerates  -patient is DNR DNI - we will respect wishes. If patient needs BIPAP then will initiate - I have discussed with son whom agrees.   -no anticoagulation for now  -very high risk for hemorrhagic conversion  -given age and comorbids I discussed with family that I am not convince he will see improvement from TPA given    Chronic kidney disease  -continue OP follow up  -renally adjusting all medications    Diabetes Mellitus 2 Uncontrolled now with Hypoglycemia  -ssi + POC  -give d50 x1 amp now to bring >90  -suspect will continue to drop BS given nature of this event and poor gluconeogenesis due to age  -will follow carefully with frequent BS    Systolic CHF, not in exacerbation  -prior EF in 2017 showed ef 50%.      I have personally reviewed the radiographs, laboratory data in Epic and decisions and statements above are based partially on this personal interpretation. Code Status: DNR/DNI  DVT Prophylaxis: SCD's  GI Prophylaxis: PPI          Subjective:   CHIEF COMPLAINT: patient arrived not responsive\"    HISTORY OF PRESENT ILLNESS:     Jeff Auguste is a 80 y.o.  male with known history as listed below presents to ED with complaint noted above. Available records were reviewed at the time of H&P. Patient with last known USOH at 1700 when interactive with neighbors and \"appeared himself\". Wife notes coming home from work at 330-4pm and he was more tired and \"slow\" in speech and activity (unusual for him) but he got some better. He stated he did not feel \"right\" at that time. Report from wife was that he was on the couch and became little to not responsive. Wife went to get neighbors - he was \"slumped\". +upper airway gurgling. EMS arrived and bs stable 104, BP elevated. ED code S called. Hemiparetic right with right facial droop, no speech. supposedly followed some minimal commands and tele neuro concern for gaze preference. Could not be walked safely, was gurgling with hypoxia on O2. CT head no findings TPA administered by discussion with tele Neuro and ED MD. Post TPA we were contacted. bp has fluctuated systolic 07'G - 271'A. BS has dropped. Son in room, is mPOA. He notes patient is DNR/DNI. We were asked to admit for work up and evaluation of the above problems.      Past Medical History:   Diagnosis Date    Arrhythmia     Arthritis     Carpal tunnel syndrome 2006    CKD (chronic kidney disease) 3/2/2017    DM type 2 (diabetes mellitus, type 2) ([facility] 10/28/2009    ED (erectile dysfunction) 10/28/2009    HTN (hypertension) 10/28/2009    Hyperlipidemia 10/28/2009    Neuropathy ([facility] 10/28/2009    Other ill-defined conditions     hyperlipidemia    Pacemaker     SDH (subdural hematoma) (HCC)     Systolic CHF ([facility] 64/02/4347    Vertigo 2007      Past Surgical History:   Procedure Laterality Date    HX PACEMAKER      NEUROLOGICAL PROCEDURE UNLISTED  1999    evacuation SDH; At [facility]. Social History   Substance Use Topics    Smoking status: Never Smoker    Smokeless tobacco: Never Used    Alcohol use No      Family History   Problem Relation Age of Onset    Stroke Mother     Pneumonia Father     MS Child     Cancer Child      breast    Hearing Impairment Child         Allergies   Allergen Reactions    Lisinopril Swelling     angioedema        Prior to Admission medications    Medication Sig Start Date End Date Taking? Authorizing Provider   atorvastatin (LIPITOR) 20 mg tablet Take 1 Tab by mouth daily. 7/5/17   Durene Homans, MD   clotrimazole-betamethasone (LOTRISONE) topical cream APPLY AA BID 3/3/17   Historical Provider   valsartan (DIOVAN) 80 mg tablet Take 1 Tab by mouth daily. 4/21/17   Durene Homans, MD   furosemide (LASIX) 40 mg tablet Take 1 Tab by mouth daily. 4/12/17   Marty Cramer MD   cholecalciferol, vitamin D3, (VITAMIN D3) 4,000 unit cap Take 4,000 Units by mouth daily. 3/2/17   Durene Homans, MD   metoprolol (LOPRESSOR) 25 mg tablet Take 1 Tab by mouth two (2) times a day.  5/2/16   Durene Homans, MD   Blood-Glucose Meter (FREESTYLE INSULINX) misc Test blood sugar once a week 6/11/14   Durene Homans, MD   Lancets (FREESTYLE LANCETS) misc Check blood sugar daily and record 6/11/14   Durene Homans, MD   glucose blood VI test strips (FREESTYLE INSULINX) strip Test blood sugar daily and record 6/11/14   Durene Homans, MD     REVIEW OF SYSTEMS:  See HPI for details    Patient was not able to provide review of systems due to mental status change/acute illness      Objective:   VITALS:    Visit Vitals    /70    Pulse 71    Resp 20    Ht 5' 9\" (1.753 m)    Wt 61.7 kg (136 lb 0.4 oz)    SpO2 99%    BMI 20.09 kg/m2     PHYSICAL EXAM:     GENERAL:    WD y   WN y   Cachectic    Thin y   Obese Disheveled y   Ill Appearing Critically y   Ill Appearing Chronically    Acute Distress y neurological   Other      HEENT:    NC/AT/EOMI y   PERRLA y   Conjunctivae Pink    Conjunctivae Pale y   Moist Mucosa    Dry Mucosa y   Hearing intact to voice y   Other      NECK:    Supple y   Masses n   Thyroid Tender n   Other                   RESPIRATORY:    CTA bilaterally WITHOUT wheezing/rhonchi/rales or crackles    Wheezing n   Rhonchi y - upper airway   Crackles y   Use of accessory muscles y   Other      CARDIAC:    regular rate and rhythm No murmurs/rubs/gallops    Murmur n   Rubs n   Gallops y- suspicion for   Rate Regular/Irregular irreg   Carotid Bruit Left/Right n   Lower Extremity Edema 1+   JVP  n   Other Normal capillary refill     ABDOMEN:    Soft non distended non tender +bowel sounds no HSM    Rigid n   Tenderness n   Hepatomegaly n   Splenomegaly    Distended n   Increased girth due to habitus    Normal/Hyper/Hypo Active Bowel Sounds hypo   Other      SKIN / MUSCULOSKELETAL:    Rashes n   Ecchymosis n   Ulcers    Tight to palpitation    Turgor Good/Poor poor   Cyanosis/Clubbing n   Amputation(s)    Other      NEUROLOGY:    cranial nerves II-XII grossly intact    Cranial Nerve Deficit    Facial Droop y right   Slurred Speech    Aphasia y   Strength Normal n   Weakness Right side hemiparesis, left side weakness   Meningismus/Kernig's Sign/ Brudzinsky n   Follows Commands n   Other Poor gag, +threat, +glabellar, withdrawal to noxious stim on left upper and lower, nailbed crush on right upper causes left arm to move. Some movement to right leg.  Left gaze preference possible right neglect but not following commands enough to tell     PSYCHIATRIC:    AAOx3 in no acute distress n   Insight Poor    Insight Good    Alert and Oriented to Person  n   Alert and Oriented to Place n   Alert and Oriented toTime n   Depressed    Anxious    Agitated    Lethargic    Stuporous y   Sedated    Other Central nervous depression   _______________________________________________________________________  Care Plan discussed with:    Comments   Patient y Discussed with patient in room. POC outlined and Questions answered (28   Family  y Wife and son in room. Patient is DDNR/KEL   RN x    Care Manager                    Consultant:  ivis MCINTYRE MD   _______________________________________________________________________  Recommended Disposition:   Home with Family    HH/PT/OT/RN    SNF/LTC y   [de-identified]    ________________________________________________________________________  TOTAL TIME:   Minutes    Critical Care Provided  70   Minutes non procedure based. I have provided critical care time. During this entire length of time I was immediately available to the patient. The reason for providing this level of medical care was due to a critical illness that impaired one or more vital organ systems, such that there was a high probability of imminent or life threatening deterioration in the patient's condition. This care involved high complexity decision making which includes reviewing the patient's past medical records, current laboratory results, and actual Xray films in order to assess, support vital system function, and to treat this degree of vital organ system failure, and to prevent further life threatening deterioration of the patients condition. Comments   >50% of visit spent in counseling and coordination of care x Chart review  Discussion with patient and/or family and questions answered     ________________________________________________________________________  Signed: Magno Hogan MD    This note will not be viewable in [address] Procedures: see electronic medical records for all procedures/Xrays and details which were not copied into this note but were reviewed prior to creation of Plan.     LAB DATA REVIEWED:    Recent Results (from the past 24 hour(s))   LACTIC ACID    Collection Time: 07/24/17  8:10 PM   Result Value Ref Range    Lactic acid 1.3 0.4 - 2.0 MMOL/L   CBC WITH AUTOMATED DIFF    Collection Time: 07/24/17  8:11 PM   Result Value Ref Range    WBC 7.4 4.1 - 11.1 K/uL    RBC 4.95 4.10 - 5.70 M/uL    HGB 13.3 12.1 - 17.0 g/dL    HCT 41.4 36.6 - 50.3 %    MCV 83.6 80.0 - 99.0 FL    MCH 26.9 26.0 - 34.0 PG    MCHC 32.1 30.0 - 36.5 g/dL    RDW 14.9 (H) 11.5 - 14.5 %    PLATELET 979 911 - 114 K/uL    NEUTROPHILS 55 32 - 75 %    LYMPHOCYTES 37 12 - 49 %    MONOCYTES 7 5 - 13 %    EOSINOPHILS 1 0 - 7 %    BASOPHILS 0 0 - 1 %    ABS. NEUTROPHILS 4.1 1.8 - 8.0 K/UL    ABS. LYMPHOCYTES 2.8 0.8 - 3.5 K/UL    ABS. MONOCYTES 0.5 0.0 - 1.0 K/UL    ABS. EOSINOPHILS 0.1 0.0 - 0.4 K/UL    ABS. BASOPHILS 0.0 0.0 - 0.1 K/UL   PROTHROMBIN TIME + INR    Collection Time: 07/24/17  8:11 PM   Result Value Ref Range    INR 1.1 0.9 - 1.1      Prothrombin time 10.9 9.0 - 96.8 sec   METABOLIC PANEL, COMPREHENSIVE    Collection Time: 07/24/17  8:11 PM   Result Value Ref Range    Sodium 140 136 - 145 mmol/L    Potassium 3.7 3.5 - 5.1 mmol/L    Chloride 104 97 - 108 mmol/L    CO2 29 21 - 32 mmol/L    Anion gap 7 5 - 15 mmol/L    Glucose 99 65 - 100 mg/dL    BUN 30 (H) 6 - 20 MG/DL    Creatinine 2.10 (H) 0.70 - 1.30 MG/DL    BUN/Creatinine ratio 14 12 - 20      GFR est AA 36 (L) >60 ml/min/1.73m2    GFR est non-AA 30 (L) >60 ml/min/1.73m2    Calcium 8.7 8.5 - 10.1 MG/DL    Bilirubin, total 0.4 0.2 - 1.0 MG/DL    ALT (SGPT) 43 12 - 78 U/L    AST (SGOT) 41 (H) 15 - 37 U/L    Alk.  phosphatase 116 45 - 117 U/L    Protein, total 7.0 6.4 - 8.2 g/dL    Albumin 3.3 (L) 3.5 - 5.0 g/dL    Globulin 3.7 2.0 - 4.0 g/dL    A-G Ratio 0.9 (L) 1.1 - 2.2     PTT    Collection Time: 07/24/17  8:11 PM   Result Value Ref Range    aPTT 27.3 22.1 - 32.5 sec    aPTT, therapeutic range     58.0 - 77.0 SECS   TROPONIN I    Collection Time: 07/24/17  8:11 PM   Result Value Ref Range    Troponin-I, Qt. 0.09 (H) <0.05 ng/mL   GLUCOSE, POC    Collection Time: 07/24/17  8:17 PM   Result Value Ref Range    Glucose (POC) 103 (H) 65 - 100 mg/dL    Performed by Sonu Aguero    EKG, 12 LEAD, INITIAL    Collection Time: 07/24/17  9:29 PM   Result Value Ref Range    Ventricular Rate 70 BPM    Atrial Rate 58 BPM    P-R Interval 192 ms    QRS Duration 116 ms    Q-T Interval 442 ms    QTC Calculation (Bezet) 477 ms    Calculated R Axis 41 degrees    Calculated T Axis -99 degrees    Diagnosis       Electronic ventricular pacemaker  When compared with ECG of 09-APR-2017 04:52,  Electronic ventricular pacemaker has replaced Electronic atrial pacemaker

## 2017-07-25 NOTE — PROGRESS NOTES
1900: Bedside and verbal report received from Francisco Lr: Assessment completed. Patient opens eyes to voice. Left arm spontaneously moves. No movement on right side upper or lower extremity. Lungs coarse bilaterally. On 50% venti mask. Wife at bedside. 2115: Patient to CT for repeat Head CT.  2140: Patient back to room following CT.   0000: Reassessment completed. 0235: Patient weaned down to 40% Ventimask. 0241: Patient increased to 50% Ventimask. O2 sats 88-89%. 0345: Reassessment completed. 0700: Bedside and verbal report given to RAUDEL Lr.

## 2017-07-25 NOTE — ED PROVIDER NOTES
HPI Comments: Victorina Robb is a 80 y.o. male who presents via EMS to the ED unresponsive x 45 minutes. Per EMS, family reports that the pt was sitting on the couch when he suddenly slumped over. EMS states that the family reports the pt was gurgling prior to calling EMS. EMS reports that the pt blood glucose level on arrival was 104, the pt had stable vitals en route, and had a systolic blood pressure of 142. Pt denies any nausea, vomiting, fevers, chills, CP, SOB, or HA. Social history significant for: - Tobacco, - EtOH, - Illicit drug use  Past medical history significant for: HLD, neuropathy, SDH, vertigo, arthritis, arrhythmia, CKSD, systolic CHF, HTN   PCP: Baron Cameron MD    HPI is limited secondary to pt unresponsive. Written by Peg Fry ED Scribe, as dictated by Ally Brown MD        The history is provided by the EMS personnel. No  was used. Past Medical History:   Diagnosis Date    Arrhythmia     Arthritis     Carpal tunnel syndrome 2006    CKD (chronic kidney disease) 3/2/2017    DM type 2 (diabetes mellitus, type 2) ([address] 10/28/2009    ED (erectile dysfunction) 10/28/2009    HTN (hypertension) 10/28/2009    Hyperlipidemia 10/28/2009    Neuropathy ([address] 10/28/2009    Other ill-defined conditions     hyperlipidemia    Pacemaker     SDH (subdural hematoma) (HCC)     Systolic CHF ([address] 92/96/8012    Vertigo 2007       Past Surgical History:   Procedure Laterality Date    HX PACEMAKER      NEUROLOGICAL PROCEDURE UNLISTED  1999    evacuation SDH; At [facility]. Family History:   Problem Relation Age of Onset    Stroke Mother     Pneumonia Father     MS Child     Cancer Child      breast    Hearing Impairment Child        Social History     Social History    Marital status:      Spouse name: N/A    Number of children: N/A    Years of education: N/A     Occupational History    Not on file.      Social History Main Topics    Smoking status: Never Smoker    Smokeless tobacco: Never Used    Alcohol use No    Drug use: No    Sexual activity: Yes     Partners: Female     Birth control/ protection: None     Other Topics Concern    Not on file     Social History Narrative    Retired                  ALLERGIES: Lisinopril    Review of Systems   Unable to perform ROS: Patient unresponsive     Patient Vitals for the past 12 hrs:   Pulse Resp BP SpO2   07/24/17 2330 76 24 107/48 (!) 88 %   07/24/17 2315 96 26 149/86 96 %   07/24/17 2245 74 20 124/78 96 %   07/24/17 2230 75 24 131/62 97 %   07/24/17 2215 71 20 127/70 99 %   07/24/17 2206 70 16 143/71 99 %   07/24/17 2132 70 22 151/82 95 %   07/24/17 2115 82 20 (!) 144/98 100 %   07/24/17 2100 81 20 125/85 100 %   07/24/17 2045 87 19 142/81 (!) 89 %   07/24/17 2030 (!) 106 21 139/66 (!) 89 %   07/24/17 2017 82 14 134/71 94 %   07/24/17 2016 98 19 134/71 94 %   07/24/17 1955 90 19 123/79 96 %     Physical Exam   Constitutional: He is oriented to person, place, and time. He appears well-developed and well-nourished. No distress. HENT:   Nose: Nose normal.   Mouth/Throat: Oropharynx is clear and moist. No oropharyngeal exudate. Eyes: Conjunctivae and EOM are normal. Pupils are equal, round, and reactive to light. No scleral icterus. Pupils 3 mm   Neck: Normal range of motion. Neck supple. No JVD present. Cardiovascular: Normal rate, regular rhythm, normal heart sounds and intact distal pulses. No murmur heard. Pulmonary/Chest: Effort normal and breath sounds normal. No stridor. No respiratory distress. He has no wheezes. He has no rales. Abdominal: Soft. Bowel sounds are normal. He exhibits no distension. There is no tenderness. There is no rebound and no guarding. Musculoskeletal: He exhibits no edema or tenderness. Neurological: He is alert and oriented to person, place, and time. He has normal strength. He displays no atrophy and no tremor.  No cranial nerve deficit or sensory deficit. He exhibits normal muscle tone. He displays a negative Romberg sign. Coordination and gait normal.   Reflex Scores:       Bicep reflexes are 2+ on the right side and 2+ on the left side. Patellar reflexes are 2+ on the right side and 2+ on the left side. Responsive to noxious stimuli only. flaccid paralysis of RUE. Right facial droop. Spontaneous movement of LUE. Non-responsive. Not following commands   Skin: Skin is warm and dry. He is not diaphoretic. Psychiatric: He has a normal mood and affect. Nursing note and vitals reviewed. MDM  Number of Diagnoses or Management Options  Cerebrovascular accident (CVA), unspecified mechanism ([facility]):      Amount and/or Complexity of Data Reviewed  Clinical lab tests: ordered and reviewed  Tests in the radiology section of CPT®: ordered and reviewed  Tests in the medicine section of CPT®: ordered and reviewed  Obtain history from someone other than the patient: yes (EMS)  Review and summarize past medical records: yes  Discuss the patient with other providers: yes (Neurology  Hospitalist)  Independent visualization of images, tracings, or specimens: yes    Critical Care  Total time providing critical care:  minutes    Patient Progress  Patient progress: improved    Procedures    CONSULT NOTE:  8:15 PM  Val Metcalf MD spoke with Stacie Johnson MD,  Specialty: Neurology  Discussed pt's hx, disposition, and available diagnostic and imaging results. Reviewed care plans. Consultant will evaluate pt. Written by MIGUELINA Negrete, as dictated by Val Metcalf MD.    PROGRESS NOTE:  9:06 PM  At recommendation of Dr. Stacie Johnson, will proceed with tPA. Risk and benefits have been explained to family. Family verbalizes their understanding and is in agreement.   Written by MIGUELINA Negrete, as dictated by Val Metcalf MD    CONSULT NOTE:   9:17 PM  Val Metcalf MD spoke with Vanesa Palma MD, Specialty: Hospitalist  Discussed pt's hx, disposition, and available diagnostic and imaging results. Reviewed care plans. Consultant will evaluate pt for admission. Written by Cleo Saldaña MD, ED Scribe, as dictated by Cleo Saldaña MD.    CONSULT NOTE:  9:27 PM  Cleo Saldaña MD spoke with Collette Boatman, MD,  Specialty: Neurology. Discussed pt's hx, disposition, and available diagnostic and imaging results. Reviewed care plans. Consultant recommends CTA. Written by Rosina Bosworth, ED scribe, as dictated by Cleo Saldaña MD    EKG interpretation: (Preliminary) 2129  Rhythm: electronic ventricular pacemaker; and regular . Rate (approx.): 70; Axis: normal; AL interval: normal; QRS interval: normal; ST/T wave: normal.  Written by Rosina Bosworth, ED scribe, as dictated by Cleo Saldaña MD    PROGRESS NOTE:  9:29 PM  Pt has pacemaker. MRI tech are unable to determine if pacemaker is MRI compatible. Discussed case with neurology will proceed with CTA of the head and neck. Updated family and informed on neurology recommendation. Informed family given  Pt hx of CKD, CTA could result in pt needing dialysis for the rest of his life. Family understands importance of the test and is in agreement to proceed. Written by Rosina Bosworth, ED scribe, as dictated by Cleo Saldaña MD    Critical Care: The reason for providing this level of medical care for this critically ill patient was due to a critical illness that impaired one or more vital organ systems such that there was a high probability of imminent or life threatening deterioration in the patients condition. This care involved high complexity decision making to assess, manipulate, and support vital system functions. 82 minutes.     LABORATORY TESTS:  Recent Results (from the past 12 hour(s))   LACTIC ACID    Collection Time: 07/24/17  8:10 PM   Result Value Ref Range    Lactic acid 1.3 0.4 - 2.0 MMOL/L   CBC WITH AUTOMATED DIFF    Collection Time: 07/24/17  8:11 PM   Result Value Ref Range    WBC 7.4 4.1 - 11.1 K/uL    RBC 4.95 4.10 - 5.70 M/uL    HGB 13.3 12.1 - 17.0 g/dL    HCT 41.4 36.6 - 50.3 %    MCV 83.6 80.0 - 99.0 FL    MCH 26.9 26.0 - 34.0 PG    MCHC 32.1 30.0 - 36.5 g/dL    RDW 14.9 (H) 11.5 - 14.5 %    PLATELET 784 933 - 636 K/uL    NEUTROPHILS 55 32 - 75 %    LYMPHOCYTES 37 12 - 49 %    MONOCYTES 7 5 - 13 %    EOSINOPHILS 1 0 - 7 %    BASOPHILS 0 0 - 1 %    ABS. NEUTROPHILS 4.1 1.8 - 8.0 K/UL    ABS. LYMPHOCYTES 2.8 0.8 - 3.5 K/UL    ABS. MONOCYTES 0.5 0.0 - 1.0 K/UL    ABS. EOSINOPHILS 0.1 0.0 - 0.4 K/UL    ABS. BASOPHILS 0.0 0.0 - 0.1 K/UL   PROTHROMBIN TIME + INR    Collection Time: 07/24/17  8:11 PM   Result Value Ref Range    INR 1.1 0.9 - 1.1      Prothrombin time 10.9 9.0 - 67.0 sec   METABOLIC PANEL, COMPREHENSIVE    Collection Time: 07/24/17  8:11 PM   Result Value Ref Range    Sodium 140 136 - 145 mmol/L    Potassium 3.7 3.5 - 5.1 mmol/L    Chloride 104 97 - 108 mmol/L    CO2 29 21 - 32 mmol/L    Anion gap 7 5 - 15 mmol/L    Glucose 99 65 - 100 mg/dL    BUN 30 (H) 6 - 20 MG/DL    Creatinine 2.10 (H) 0.70 - 1.30 MG/DL    BUN/Creatinine ratio 14 12 - 20      GFR est AA 36 (L) >60 ml/min/1.73m2    GFR est non-AA 30 (L) >60 ml/min/1.73m2    Calcium 8.7 8.5 - 10.1 MG/DL    Bilirubin, total 0.4 0.2 - 1.0 MG/DL    ALT (SGPT) 43 12 - 78 U/L    AST (SGOT) 41 (H) 15 - 37 U/L    Alk.  phosphatase 116 45 - 117 U/L    Protein, total 7.0 6.4 - 8.2 g/dL    Albumin 3.3 (L) 3.5 - 5.0 g/dL    Globulin 3.7 2.0 - 4.0 g/dL    A-G Ratio 0.9 (L) 1.1 - 2.2     PTT    Collection Time: 07/24/17  8:11 PM   Result Value Ref Range    aPTT 27.3 22.1 - 32.5 sec    aPTT, therapeutic range     58.0 - 77.0 SECS   TROPONIN I    Collection Time: 07/24/17  8:11 PM   Result Value Ref Range    Troponin-I, Qt. 0.09 (H) <0.05 ng/mL   GLUCOSE, POC    Collection Time: 07/24/17  8:17 PM   Result Value Ref Range    Glucose (POC) 103 (H) 65 - 100 mg/dL    Performed by Jeremy Ugarte EKG, 12 LEAD, INITIAL    Collection Time: 07/24/17  9:29 PM   Result Value Ref Range    Ventricular Rate 70 BPM    Atrial Rate 58 BPM    P-R Interval 192 ms    QRS Duration 116 ms    Q-T Interval 442 ms    QTC Calculation (Bezet) 477 ms    Calculated R Axis 41 degrees    Calculated T Axis -99 degrees    Diagnosis       Electronic ventricular pacemaker  When compared with ECG of 09-APR-2017 04:52,  Electronic ventricular pacemaker has replaced Electronic atrial pacemaker         IMAGING RESULTS:  CTA CODE NEURO HEAD AND NECK W CONT         XR CHEST PORT   Final Result   CXR Results  (Last 48 hours)               07/24/17 2041  XR CHEST PORT Final result    Impression:  Impression:   Unchanged mild cardiomegaly       Narrative:  Chest portable AP       History: Chest pain. Comparison: 4/9/2017       Findings:  A left subclavian pacemaker is in place. The heart is mildly   enlarged, but unchanged. There is unchanged mild pulmonary vasculature   engorgement. No focal consolidation, pleural effusion, or pneumothorax. The   visualized osseous structures appear unremarkable. CT HEAD WO CONT   Final Result   EXAM:  CT HEAD WO CONT     INDICATION:   Unresponsive     COMPARISON: 12/30/2016.     CONTRAST:  None.     TECHNIQUE: Unenhanced CT of the head was performed using 5 mm images. Brain and  bone windows were generated. CT dose reduction was achieved through use of a  standardized protocol tailored for this examination and automatic exposure  control for dose modulation.       FINDINGS:  The ventricles and sulci are normal in size, shape and configuration and  midline. There is a 2.0 cm arachnoid cyst again seen above the left cerebellar  lobe underneath the tentorium. There is moderate white matter disease likely  related to chronic small vessel ischemic disease. . There is no intracranial  hemorrhage, extra-axial collection, mass, mass effect or midline shift. The  basilar cisterns are open.  No acute infarct is identified. The bone windows  demonstrate 2 left-sided kai holes. . There is complete opacification of the  left maxillary sinus. .     IMPRESSION  IMPRESSION: No significant interval change. No evidence of acute process. MEDICATIONS GIVEN:  Medications   sodium chloride (NS) flush 5-10 mL (not administered)   0.9% sodium chloride infusion (not administered)   iopamidol (ISOVUE-370) 76 % injection 100 mL (not administered)   sodium chloride (NS) flush 10 mL (not administered)   sodium chloride (NS) flush 5 mL (not administered)   sodium chloride (NS) flush 5 mL (not administered)   labetalol (NORMODYNE;TRANDATE) injection 10 mg (not administered)   0.9% sodium chloride infusion (150 mL/hr IntraVENous New Bag 7/24/17 2140)   alteplase (ACTIVASE) bolus dose (5.55 mg IntraVENous Given 7/24/17 2103)   alteplase (ACTIVASE) infusion 49.98 mg (50 mg IntraVENous Given 7/24/17 2109)       IMPRESSION:  1. Cerebrovascular accident (CVA), unspecified mechanism ([address]        PLAN:  1. Admit to hospitalist    ADMIT NOTE:  9:08 PM  Patient is being admitted to the hospital.  The results of their tests and reasons for their admission have been discussed with them and/or available family. They convey agreement and understanding for the need to be admitted and for their admission diagnosis. Consultation has been made with the inpatient physician specialist for hospitalization. This note is prepared by Leonel Wheat, acting as Scribe for Jackie Yepez MD.    Jackie Yepez MD: The scribe's documentation has been prepared under my direction and personally reviewed by me in its entirety. I confirm that the note above accurately reflects all work, treatment, procedures, and medical decision making performed by me.

## 2017-07-25 NOTE — PROGRESS NOTES
Pt has Pacemaker. Card obtained. Wife claims Pt has had brain surgery but unsure of what was done. She will contact pt son in the AM to verify.

## 2017-07-25 NOTE — CONSULTS
[address] [facility]  [facility]     Date of  Admission: 7/24/2017  7:49 PM     Admission type:Emergency    Consult for: CVA  Consult by: Hospitalist      Subjective:     Neo Carrion. is a 80 y.o. male with PMH HLD, DM, HTN, CKD, PAF, sHF, SSS s/p pacemaker, SVT s/p ablation, subdural hematoma who was admitted for CVA (cerebral vascular accident) ([facility] [address] Per ED Provider note, Mr. Laurence Sheffield was sitting on the cough at home when he suddenly slumped over and had altered mental status. Admitted with suspected acute right MCA stroke and is s/p TPA. During assessment, Mr. Laurence Sheffield opens his eyes to verbal, but is not talking or following commands. Unable to perform ROS due to patient condition. Mr. Laurence Sheffield follows with Dr. Yadiel Davila and Dr. Chelsie Mace for cardiology/EP. His last pacer check 06/02/17 does not describe any events, however, Dr. Rios last office note indicates a pacer check showed some PAF - patient not a candidate for [facility] due to previous subdural and age/fall risk. Last ECHO 04/17 with EF 50-55%; NWMA; mild MR. Last Stress negative 06/13. SVT ablation 12/13. Cardiac risk factors: dyslipidemia, diabetes mellitus, male gender, hypertension.       Patient Active Problem List    Diagnosis Date Noted    Aspiration into airway 07/25/2017    CVA (cerebral vascular accident) ([address] 07/24/2017    Shortness of breath 04/10/2017    ACP (advance care planning) 04/10/2017    Goals of care, counseling/discussion 04/10/2017    Edema 04/09/2017    Type 2 DM with CKD stage 3 and hypertension ([address] 14/51/9936    Systolic CHF, acute ([address] 04/09/2017    CKD (chronic kidney disease) 03/02/2017    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 infarction 01/29/2016    Diabetic peripheral neuropathy associated with type 2 diabetes mellitus ([address] 01/29/2016    Idiopathic small and large fiber sensory neuropathy 01/29/2016    Dizziness and giddiness 07/30/2015    Memory loss 07/30/2015    SVT (supraventricular tachycardia) ([address] 12/10/2013    Pacemaker 08/21/2013    SSS (sick sinus syndrome) ([address] 08/06/2013    PVC (premature ventricular contraction) 08/06/2013    PAF (paroxysmal atrial fibrillation) ([address] 08/06/2013    Vertigo 02/16/2012    Vitamin D deficiency 11/30/2009    DM type 2 (diabetes mellitus, type 2) ([address] 10/28/2009    HTN (hypertension) 10/28/2009    Hyperlipidemia 10/28/2009    ED (erectile dysfunction) 10/28/2009      Blaze Rodriguez MD  Past Medical History:   Diagnosis Date    Arrhythmia     Arthritis     Carpal tunnel syndrome 2006    CKD (chronic kidney disease) 3/2/2017    DM type 2 (diabetes mellitus, type 2) ([address] 10/28/2009    ED (erectile dysfunction) 10/28/2009    HTN (hypertension) 10/28/2009    Hyperlipidemia 10/28/2009    Neuropathy ([address] 10/28/2009    Other ill-defined conditions     hyperlipidemia    Pacemaker     SDH (subdural hematoma) (HCC)     Systolic CHF ([address] 86/33/5833    Vertigo 2007      Social History     Social History    Marital status:      Spouse name: N/A    Number of children: N/A    Years of education: N/A     Social History Main Topics    Smoking status: Never Smoker    Smokeless tobacco: Never Used    Alcohol use No    Drug use: No    Sexual activity: Yes     Partners: Female     Birth control/ protection: None     Other Topics Concern    Not on file     Social History Narrative    Retired              Allergies   Allergen Reactions    Lisinopril Swelling     angioedema      Family History   Problem Relation Age of Onset    Stroke Mother     Pneumonia Father     MS Child     Cancer Child      breast    Hearing Impairment Child       Current Facility-Administered Medications   Medication Dose Route Frequency    labetalol (NORMODYNE;TRANDATE) injection 10 mg  10 mg IntraVENous Q2H PRN    albuterol-ipratropium (DUO-NEB) 2.5 MG-0.5 MG/3 ML  3 mL Nebulization Q2H PRN    0.9% sodium chloride infusion  75 mL/hr IntraVENous CONTINUOUS    glucose chewable tablet 16 g  4 Tab Oral PRN    glucagon (GLUCAGEN) injection 1 mg  1 mg IntraMUSCular PRN    sodium chloride (NS) flush 5-10 mL  5-10 mL IntraVENous Q8H    sodium chloride (NS) flush 5-10 mL  5-10 mL IntraVENous PRN    sodium chloride (NS) flush 5-10 mL  5-10 mL IntraVENous Q8H    sodium chloride (NS) flush 5-10 mL  5-10 mL IntraVENous PRN    dextrose 10% infusion 125-250 mL  125-250 mL IntraVENous PRN    mupirocin (BACTROBAN) 2 % ointment   Both Nostrils BID    albuterol-ipratropium (DUO-NEB) 2.5 MG-0.5 MG/3 ML  3 mL Nebulization QID RT    piperacillin-tazobactam (ZOSYN) 4.5 g in 0.9% sodium chloride (MBP/ADV) 100 mL  4.5 g IntraVENous Q8H    insulin lispro (HUMALOG) injection   SubCUTAneous Q6H    sodium chloride (NS) flush 5-10 mL  5-10 mL IntraVENous PRN    sodium chloride (NS) flush 5 mL  5 mL IntraVENous Q8H    sodium chloride (NS) flush 5 mL  5 mL IntraVENous PRN        Review of Symptoms: unable to complete due to patient condition             Objective:      Visit Vitals    BP (!) 89/68    Pulse 87    Temp 97.8 °F (36.6 °C)    Resp 26    Ht 5' 9\" (1.753 m)    Wt 64.7 kg (142 lb 10.2 oz)    SpO2 91%    BMI 21.06 kg/m2       Physical:   General: elderly AAM resting in bed in NAD with eyes closed, opens eyes momentarily to verbal stimuli   Heart: RRR, no m/S3/JVD  Lungs: coarse rhonchi bilaterally   Abdomen: Soft, +BS, NTND   Extremities: LE js +DP/PT, no edema   Neurologic: minimal response. Moves LUE and LLE spontaneously. No movement noted to RUE.       Data Review:   Recent Labs      07/24/17 2011   WBC  7.4   HGB  13.3   HCT  41.4   PLT  182     Recent Labs      07/24/17 2011   NA  140   K  3.7   CL  104   CO2  29   GLU  99   BUN  30*   CREA  2.10*   CA  8.7   ALB  3.3*   TBILI  0.4   SGOT  41*   ALT  43   INR  1.1       Recent Labs 07/24/17 2011   TROIQ  0.09*         Intake/Output Summary (Last 24 hours) at 07/25/17 1154  Last data filed at 07/25/17 0800   Gross per 24 hour   Intake           1037.5 ml   Output              250 ml   Net            787.5 ml        Cardiographics    Telemetry: a-paced. Some AV pacing. ? WCT early this morning    ECG: AV pacing with ? PVCs  Echocardiogram: last as above   CXRAY: (7/24) no acute process                (7/25) \"New mild pulmonary edema with right pleural effusion. \"       Assessment:       Active Problems:    PAF (paroxysmal atrial fibrillation) ([facility] [address] (8/6/2013)      CVA (cerebral vascular accident) ([facility] [address] (7/24/2017)      Aspiration into airway (7/25/2017)         Plan:     Isabel Gorman is a 80 y.o. male who presented to the hospital with CVA and was given TPA. Still with severe deficits. Patient has a history of multiple cardiac conditions as noted above in HPI. Some PAF identified on patient's pacemaker by Dr. Jacob Black back in 05/17. Was not felt to be a candidate for anticoagulation due to his history of a subdural coupled with his age/fall risk. CHADSVASC= 7.  HASBLED = 3  Trop 0.09. Creat 2.10. · Interrogate pacemaker to check for further a-fib and questionable WCT this morning  · Would need to discuss with neurology if/when patient would be able to receive anticoagulation for PAF. · Patient's BP and HF meds on hold for hypotension currently - also patient would need to be on IV medications until cleared for PO  · Aspiration per hospitalist and intensivist    · Recent ECHO - not felt to need repeating at this time. Thank you for consulting [facility]      Melissa Hampton, KEE  DNP, RN, AGACNP-BC    Patient seen and examined with nurse practitioner Melissa Hampton. I agree with the assessment and plan as noted. Dr. King Avelar will follow with you starting tomorrow.

## 2017-07-25 NOTE — CONSULTS
NEUROLOGY NOTE       DATE OF CONSULTATION: 7/25/2017    CONSULTED BY: Babita Sethi MD    Chief Complaint   Patient presents with   Anthony nicanor       Reason for Consult  I have been asked to see the patient in neurological consultation to render advice and opinion regarding stroke    HISTORY OF PRESENT ILLNESS  Anjana Sofia is a 80 y.o. male who presents to the hospital because of A stroke. The patient was last known well at 5 PM.  When the wife came home at around 4 PM the patient was tired and slow in speech. He was not feeling great at that time. Patient was on the couch when he became little to not responsive. The patient was slumped over and EMS was called for an evaluation. The patient was brought to the hospital and Code S was called. The patient was found to have right hemiparesis and patient was given TPA as the patient was within the TPA window. The patient has been admitted to the ICU for observation. The patient did have recent check of the pacemaker which showed atrial fibrillation. The patient is also have history of subdural hematoma.     ROS  A ten system review of constitutional, cardiovascular, respiratory, musculoskeletal, endocrine, skin, SHEENT, genitourinary, psychiatric and neurologic systems was obtained and is unremarkable except as stated in HPI     PMH  Past Medical History:   Diagnosis Date    Arrhythmia     Arthritis     Carpal tunnel syndrome 2006    CKD (chronic kidney disease) 3/2/2017    DM type 2 (diabetes mellitus, type 2) ([address] 10/28/2009    ED (erectile dysfunction) 10/28/2009    HTN (hypertension) 10/28/2009    Hyperlipidemia 10/28/2009    Neuropathy ([address] 10/28/2009    Other ill-defined conditions     hyperlipidemia    Pacemaker     SDH (subdural hematoma) (HCC)     Systolic CHF ([address] 01/33/8569    Vertigo 2007         Family History   Problem Relation Age of Onset    Stroke Mother     Pneumonia Father     MS Child     Cancer Child breast    Hearing Impairment Child        SH  Social History     Social History    Marital status:      Spouse name: N/A    Number of children: N/A    Years of education: N/A     Social History Main Topics    Smoking status: Never Smoker    Smokeless tobacco: Never Used    Alcohol use No    Drug use: No    Sexual activity: Yes     Partners: Female     Birth control/ protection: None     Other Topics Concern    Not on file     Social History Narrative    Retired                ALLERGIES  Allergies   Allergen Reactions    Lisinopril Swelling     angioedema       PHYSICAL EXAM  EXAMINATION:   Patient Vitals for the past 24 hrs:   Temp Pulse Resp BP SpO2   07/25/17 1300 - 72 26 122/55 94 %   07/25/17 1250 - - - - 95 %   07/25/17 1230 - 71 25 128/52 96 %   07/25/17 1200 99.9 °F (37.7 °C) 76 30 125/57 96 %   07/25/17 1130 - 73 24 111/53 94 %   07/25/17 1100 - 70 26 105/54 95 %   07/25/17 1000 - 87 26 (!) 89/68 91 %   07/25/17 0930 - 77 28 117/56 93 %   07/25/17 0900 - 73 22 111/49 90 %   07/25/17 0845 - 72 26 108/45 (!) 88 %   07/25/17 0830 - 79 22 114/60 93 %   07/25/17 0815 - 74 29 111/53 92 %   07/25/17 0800 97.8 °F (36.6 °C) 71 14 (!) 118/101 100 %   07/25/17 0745 - 94 25 124/45 98 %   07/25/17 0730 - 90 (!) 31 118/49 97 %   07/25/17 0715 - 99 28 101/59 99 %   07/25/17 0700 - 85 29 93/56 95 %   07/25/17 0630 - 88 27 104/44 94 %   07/25/17 0600 - 80 (!) 33 121/48 94 %   07/25/17 0515 - (!) 110 27 90/61 99 %   07/25/17 0500 - 79 27 109/47 95 %   07/25/17 0432 - 93 (!) 38 152/60 98 %   07/25/17 0415 - 79 30 109/45 92 %   07/25/17 0404 97 °F (36.1 °C) 75 (!) 31 (!) 111/20 91 %   07/25/17 0349 - (!) 127 30 91/74 96 %   07/25/17 0337 - (!) 109 23 93/52 97 %   07/25/17 0300 - 71 30 (!) 87/36 92 %   07/25/17 0241 - - - - 95 %   07/25/17 0240 - 71 24 106/45 91 %   07/25/17 0202 - 84 (!) 32 (!) 117/37 (!) 76 %   07/25/17 0136 - (!) 112 27 97/54 (!) 88 %   07/25/17 0130 - (!) 104 29 (!) 87/55 (!) 89 % 07/25/17 0103 97.9 °F (36.6 °C) 100 30 95/56 92 %   07/25/17 0101 - 100 26 - 94 %   07/25/17 0057 - 97 24 - -   07/25/17 0000 - 90 27 158/76 98 %   07/24/17 2356 - 71 23 106/87 92 %   07/24/17 2330 - 76 24 107/48 (!) 88 %   07/24/17 2315 - 96 26 149/86 96 %   07/24/17 2245 - 74 20 124/78 96 %   07/24/17 2230 - 75 24 131/62 97 %   07/24/17 2215 - 71 20 127/70 99 %   07/24/17 2206 - 70 16 143/71 99 %   07/24/17 2132 - 70 22 151/82 95 %   07/24/17 2115 - 82 20 (!) 144/98 100 %   07/24/17 2100 - 81 20 125/85 100 %   07/24/17 2045 - 87 19 142/81 (!) 89 %   07/24/17 2030 - (!) 106 21 139/66 (!) 89 %   07/24/17 2017 - 82 14 134/71 94 %   07/24/17 2016 - 98 19 134/71 94 %   07/24/17 1955 - 90 19 123/79 96 %        General:   General appearance: Pt is in no acute distress   Distal pulses are preserved  Fundoscopic exam: attempted    Neurological Examination:   Mental Status: Pt is stuporous. Cranial Nerves: Visual fields are full . PERRL, E Facial movement intact, symmetric. Hearing intact to conversation. Motor: Right upper extremity 0 out of 5 and the patient moves all other extremities and withdraws to painful stimuli    Sensation: Hard to assess because of mental status    Reflexes: DTRs 2+ throughout. Plantar responses upgoing on the right otherwise downgoing on the left. Coordination/Cerebellar: Unable to perform    Gait: Unable to perform    Skin: No significant bruising or lacerations.     LAB DATA REVIEWED:    Results for orders placed or performed during the hospital encounter of 07/24/17   LACTIC ACID   Result Value Ref Range    Lactic acid 1.3 0.4 - 2.0 MMOL/L   URINALYSIS W/ REFLEX CULTURE   Result Value Ref Range    Color YELLOW/STRAW      Appearance CLEAR CLEAR      Specific gravity 1.010 1.003 - 1.030      pH (UA) 7.0 5.0 - 8.0      Protein 30 (A) NEG mg/dL    Glucose NEGATIVE  NEG mg/dL    Ketone NEGATIVE  NEG mg/dL    Bilirubin NEGATIVE  NEG      Blood TRACE (A) NEG      Urobilinogen 1.0 0.2 - 1.0 EU/dL    Nitrites NEGATIVE  NEG      Leukocyte Esterase NEGATIVE  NEG      UA:UC IF INDICATED CULTURE NOT INDICATED BY UA RESULT CNI      WBC 0-4 0 - 4 /hpf    RBC 5-10 0 - 5 /hpf    Epithelial cells FEW FEW /lpf    Bacteria NEGATIVE  NEG /hpf    Hyaline cast 2-5 0 - 5 /lpf   CBC WITH AUTOMATED DIFF   Result Value Ref Range    WBC 7.4 4.1 - 11.1 K/uL    RBC 4.95 4.10 - 5.70 M/uL    HGB 13.3 12.1 - 17.0 g/dL    HCT 41.4 36.6 - 50.3 %    MCV 83.6 80.0 - 99.0 FL    MCH 26.9 26.0 - 34.0 PG    MCHC 32.1 30.0 - 36.5 g/dL    RDW 14.9 (H) 11.5 - 14.5 %    PLATELET 015 077 - 365 K/uL    NEUTROPHILS 55 32 - 75 %    LYMPHOCYTES 37 12 - 49 %    MONOCYTES 7 5 - 13 %    EOSINOPHILS 1 0 - 7 %    BASOPHILS 0 0 - 1 %    ABS. NEUTROPHILS 4.1 1.8 - 8.0 K/UL    ABS. LYMPHOCYTES 2.8 0.8 - 3.5 K/UL    ABS. MONOCYTES 0.5 0.0 - 1.0 K/UL    ABS. EOSINOPHILS 0.1 0.0 - 0.4 K/UL    ABS. BASOPHILS 0.0 0.0 - 0.1 K/UL   PROTHROMBIN TIME + INR   Result Value Ref Range    INR 1.1 0.9 - 1.1      Prothrombin time 10.9 9.0 - 82.8 sec   METABOLIC PANEL, COMPREHENSIVE   Result Value Ref Range    Sodium 140 136 - 145 mmol/L    Potassium 3.7 3.5 - 5.1 mmol/L    Chloride 104 97 - 108 mmol/L    CO2 29 21 - 32 mmol/L    Anion gap 7 5 - 15 mmol/L    Glucose 99 65 - 100 mg/dL    BUN 30 (H) 6 - 20 MG/DL    Creatinine 2.10 (H) 0.70 - 1.30 MG/DL    BUN/Creatinine ratio 14 12 - 20      GFR est AA 36 (L) >60 ml/min/1.73m2    GFR est non-AA 30 (L) >60 ml/min/1.73m2    Calcium 8.7 8.5 - 10.1 MG/DL    Bilirubin, total 0.4 0.2 - 1.0 MG/DL    ALT (SGPT) 43 12 - 78 U/L    AST (SGOT) 41 (H) 15 - 37 U/L    Alk.  phosphatase 116 45 - 117 U/L    Protein, total 7.0 6.4 - 8.2 g/dL    Albumin 3.3 (L) 3.5 - 5.0 g/dL    Globulin 3.7 2.0 - 4.0 g/dL    A-G Ratio 0.9 (L) 1.1 - 2.2     PTT   Result Value Ref Range    aPTT 27.3 22.1 - 32.5 sec    aPTT, therapeutic range     58.0 - 77.0 SECS   TROPONIN I   Result Value Ref Range    Troponin-I, Qt. 0.09 (H) <0.05 ng/mL   BLOOD GAS, ARTERIAL   Result Value Ref Range    pH 7.45 7.35 - 7.45      PCO2 31 (L) 35.0 - 45.0 mmHg    PO2 39 (LL) 80 - 100 mmHg    O2 SAT 76 (L) 92 - 97 %    BICARBONATE 21 (L) 22 - 26 mmol/L    BASE DEFICIT 1.5 mmol/L    O2 METHOD NRM      O2 FLOW RATE 15.00 L/min    FIO2 100 %    MODE OTHER      SPONTANEOUS RATE 26.0      Sample source ARTERIAL      SITE RIGHT RADIAL      LEOLA'S TEST YES      Critical value read back Ramon Wilson RN    GLUCOSE, POC   Result Value Ref Range    Glucose (POC) 103 (H) 65 - 100 mg/dL    Performed by Fernando Casey    GLUCOSE, POC   Result Value Ref Range    Glucose (POC) 154 (H) 65 - 100 mg/dL    Performed by Shon Dixon    GLUCOSE, POC   Result Value Ref Range    Glucose (POC) 160 (H) 65 - 100 mg/dL    Performed by [address] [place] [address], POC   Result Value Ref Range    Glucose (POC) 167 (H) 65 - 100 mg/dL    Performed by Shon Destiny    GLUCOSE, POC   Result Value Ref Range    Glucose (POC) 172 (H) 65 - 100 mg/dL    Performed by Michaela Upton    EKG, 12 LEAD, INITIAL   Result Value Ref Range    Ventricular Rate 70 BPM    Atrial Rate 58 BPM    P-R Interval 192 ms    QRS Duration 116 ms    Q-T Interval 442 ms    QTC Calculation (Bezet) 477 ms    Calculated R Axis 41 degrees    Calculated T Axis -99 degrees    Diagnosis       Electronic ventricular pacemaker  When compared with ECG of 09-APR-2017 04:52,  Electronic ventricular pacemaker has replaced Electronic atrial pacemaker          Imaging review:  See below. Stroke workup    CT scan head  Normal    CT angiogram head and neck  Normal    TTE:     Pending  Stroke labs:  HgBA1c    Lab Results   Component Value Date/Time    Hemoglobin A1c 5.6 11/03/2016 10:32 AM     LDL   Lab Results   Component Value Date/Time    LDL, calculated 134 11/03/2016 10:32 AM       HOME MEDS  Prior to Admission Medications   Prescriptions Last Dose Informant Patient Reported? Taking?    Blood-Glucose Meter (FREESTYLE INSULINX) misc   No No Sig: Test blood sugar once a week   Lancets (FREESTYLE LANCETS) misc   No No   Sig: Check blood sugar daily and record   atorvastatin (LIPITOR) 20 mg tablet   No No   Sig: Take 1 Tab by mouth daily. cholecalciferol, vitamin D3, (VITAMIN D3) 4,000 unit cap   No No   Sig: Take 4,000 Units by mouth daily. clotrimazole-betamethasone (LOTRISONE) topical cream   Yes No   Sig: APPLY AA BID   furosemide (LASIX) 40 mg tablet   No No   Sig: Take 1 Tab by mouth daily. glucose blood VI test strips (FREESTYLE INSULINX) strip   No No   Sig: Test blood sugar daily and record   metoprolol (LOPRESSOR) 25 mg tablet   No No   Sig: Take 1 Tab by mouth two (2) times a day. valsartan (DIOVAN) 80 mg tablet   No No   Sig: Take 1 Tab by mouth daily. Facility-Administered Medications: None       CURRENT MEDS  Current Facility-Administered Medications   Medication Dose Route Frequency    0.9% sodium chloride infusion  75 mL/hr IntraVENous CONTINUOUS    sodium chloride (NS) flush 5-10 mL  5-10 mL IntraVENous Q8H    sodium chloride (NS) flush 5-10 mL  5-10 mL IntraVENous Q8H    mupirocin (BACTROBAN) 2 % ointment   Both Nostrils BID    albuterol-ipratropium (DUO-NEB) 2.5 MG-0.5 MG/3 ML  3 mL Nebulization QID RT    piperacillin-tazobactam (ZOSYN) 4.5 g in 0.9% sodium chloride (MBP/ADV) 100 mL  4.5 g IntraVENous Q8H    insulin lispro (HUMALOG) injection   SubCUTAneous Q6H    sodium chloride (NS) flush 5 mL  5 mL IntraVENous Q8H       IMPRESSION:  Carola Renteria is a 80 y.o. male who presents with altered mental status and right-sided weakness. The patient was given TPA yesterday evening. RECOMMENDATIONS:  -CT scan head and CT angiogram head and neck are normal  - TTE - Pending  - Telemetry  - BP goal is less than 180/105 for 24 hours after TPA and after that less than 140/100  - Stroke labs (HgbA1c, TSH, lipid panel)  -No aspirin at this time. Repeat CT scan in 24 hours after TPA administration.   If negative start aspirin 81 mg a day. We will discuss about anticoagulation after the CT scan of the head has been done and based on the size of the stroke. - Start atorvastatin 40 mg daily if LDL more than 70.  - ST/OT/PT eval    35 minutes critical care time provided. Thank you very much for this consultation. We will follow up on the above studies and give further recommendations as indicated.       Kevyn Cooper MD  Neurologist

## 2017-07-25 NOTE — PROGRESS NOTES
Attended Critical Care Unit Interdisciplinary Rounds during which patient care was discussed.     YEE Camargo, [facility], [address] [place] [address] [address]     Paging Service  [phone]

## 2017-07-26 PROBLEM — R53.81 DEBILITY: Status: ACTIVE | Noted: 2017-01-01

## 2017-07-26 PROBLEM — R53.1 WEAKNESS: Status: ACTIVE | Noted: 2017-01-01

## 2017-07-26 PROBLEM — Z71.89 COUNSELING REGARDING ADVANCED CARE PLANNING AND GOALS OF CARE: Status: ACTIVE | Noted: 2017-01-01

## 2017-07-26 NOTE — PROGRESS NOTES
Pharmacy Automatic Renal Dosing Protocol - Antimicrobials    Indication for Antimicrobials: aspiration PNA  Current Regimen of Each Antimicrobial (Start Day & Day of Therapy):  Piperacillin-tazobactam 4.5 gm every 8 hours (, day 2)    Significant Cultures:  none  CAPD, Hemodialysis or Renal Replacement Therapy: na  Paralysis, amputations, malnutrition: na  Recent Labs      17   0444  17   CREA  2.34*  2.10*   BUN  33*  30*   WBC  14.7*  7.4     Temp (24hrs), Av.7 °F (37.6 °C), Min:98.9 °F (37.2 °C), Max:100.3 °F (37.9 °C)    Creatinine Clearance (Creatinine Clearance (ml/min)):  18    Impression/Plan: reduced zosyn 4.5 gm every 12 hours for CrCl < 20       Pharmacy will follow daily and adjust medications as appropriate for renal function and/or serum levels.     Thank you,  Ari Rainey, PHARMD

## 2017-07-26 NOTE — PROGRESS NOTES
Attended Critical Care Unit Interdisciplinary Rounds during which patient care was discussed.   YEE Pak, [address], [address] [place] [address] [address]     Paging Service  [phone]

## 2017-07-26 NOTE — CONSULTS
NEUROLOGY NOTE         Chief Complaint   Patient presents with    Unresponsive     SUBJECTIVE   - No overnight issues  - CT head at 24 hr did not show any bleed    HISTORY OF PRESENT ILLNESS  Opal Thorne is a 80 y.o. male who presents to the hospital because of A stroke. The patient was last known well at 5 PM.  When the wife came home at around 4 PM the patient was tired and slow in speech. He was not feeling great at that time. Patient was on the couch when he became little to not responsive. The patient was slumped over and EMS was called for an evaluation. The patient was brought to the hospital and Code S was called. The patient was found to have right hemiparesis and patient was given TPA as the patient was within the TPA window. The patient has been admitted to the ICU for observation. The patient did have recent check of the pacemaker which showed atrial fibrillation. The patient is also have history of subdural hematoma.     ROS  A ten system review of constitutional, cardiovascular, respiratory, musculoskeletal, endocrine, skin, SHEENT, genitourinary, psychiatric and neurologic systems was obtained and is unremarkable except as stated in HPI     PHYSICAL EXAM  EXAMINATION:   Patient Vitals for the past 24 hrs:   Temp Pulse Resp BP SpO2   07/26/17 0911 - (!) 127 27 112/59 95 %   07/26/17 0900 - (!) 113 25 - 95 %   07/26/17 0800 98.9 °F (37.2 °C) (!) 114 22 (!) 68/51 93 %   07/26/17 0738 - - - - 93 %   07/26/17 0707 - 100 19 97/49 93 %   07/26/17 0702 - 97 20 (!) 88/47 94 %   07/26/17 0700 - 94 22 (!) 77/18 93 %   07/26/17 0600 - (!) 101 26 120/66 95 %   07/26/17 0500 - 95 30 115/52 96 %   07/26/17 0404 98.9 °F (37.2 °C) 91 29 133/68 96 %   07/26/17 0300 - 85 28 98/58 94 %   07/26/17 0230 - 92 23 146/55 96 %   07/26/17 0200 - 93 26 (!) 130/96 96 %   07/26/17 0100 - 92 25 135/47 96 %   07/26/17 0005 100.2 °F (37.9 °C) 98 28 128/69 98 %   07/25/17 2144 - - - (!) 108/91 -   07/25/17 2100 - 89 25 96/67 97 %   07/25/17 2012 - - - - 97 %   07/25/17 2000 - 88 25 103/66 97 %   07/25/17 1926 - - - - 96 %   07/25/17 1900 100.3 °F (37.9 °C) 79 24 111/51 98 %   07/25/17 1802 - 78 25 102/58 98 %   07/25/17 1739 - 81 22 110/47 97 %   07/25/17 1700 - 84 20 105/48 95 %   07/25/17 1630 - 89 27 - 97 %   07/25/17 1600 99.7 °F (37.6 °C) 89 28 108/53 96 %   07/25/17 1550 - - - - 94 %   07/25/17 1530 - (!) 117 25 93/50 94 %   07/25/17 1503 - 80 26 158/58 96 %   07/25/17 1406 - 71 25 122/64 95 %   07/25/17 1400 - 85 29 - 96 %   07/25/17 1330 - 73 27 106/83 96 %   07/25/17 1300 - 72 26 122/55 94 %   07/25/17 1250 - - - - 95 %   07/25/17 1230 - 71 25 128/52 96 %   07/25/17 1200 99.9 °F (37.7 °C) 76 30 125/57 96 %   07/25/17 1130 - 73 24 111/53 94 %   07/25/17 1100 - 70 26 105/54 95 %        General:   General appearance: Pt is in no acute distress   Distal pulses are preserved    Neurological Examination:   Mental Status: Pt is awake but does not follow commands for me. .     Cranial Nerves: Visual fields are full . PERRL, Facial movement intact, symmetric. Hearing intact to conversation. Motor: RUE and RLE - withdraws to painful stimuli    Sensation: Hard to assess because of mental status    Coordination/Cerebellar: Unable to perform    Gait: Unable to perform    Skin: No significant bruising or lacerations.     LAB DATA REVIEWED:    Results for orders placed or performed during the hospital encounter of 07/24/17   LACTIC ACID   Result Value Ref Range    Lactic acid 1.3 0.4 - 2.0 MMOL/L   URINALYSIS W/ REFLEX CULTURE   Result Value Ref Range    Color YELLOW/STRAW      Appearance CLEAR CLEAR      Specific gravity 1.010 1.003 - 1.030      pH (UA) 7.0 5.0 - 8.0      Protein 30 (A) NEG mg/dL    Glucose NEGATIVE  NEG mg/dL    Ketone NEGATIVE  NEG mg/dL    Bilirubin NEGATIVE  NEG      Blood TRACE (A) NEG      Urobilinogen 1.0 0.2 - 1.0 EU/dL    Nitrites NEGATIVE  NEG      Leukocyte Esterase NEGATIVE  NEG      UA:UC IF INDICATED CULTURE NOT INDICATED BY UA RESULT CNI      WBC 0-4 0 - 4 /hpf    RBC 5-10 0 - 5 /hpf    Epithelial cells FEW FEW /lpf    Bacteria NEGATIVE  NEG /hpf    Hyaline cast 2-5 0 - 5 /lpf   CBC WITH AUTOMATED DIFF   Result Value Ref Range    WBC 7.4 4.1 - 11.1 K/uL    RBC 4.95 4.10 - 5.70 M/uL    HGB 13.3 12.1 - 17.0 g/dL    HCT 41.4 36.6 - 50.3 %    MCV 83.6 80.0 - 99.0 FL    MCH 26.9 26.0 - 34.0 PG    MCHC 32.1 30.0 - 36.5 g/dL    RDW 14.9 (H) 11.5 - 14.5 %    PLATELET 196 908 - 990 K/uL    NEUTROPHILS 55 32 - 75 %    LYMPHOCYTES 37 12 - 49 %    MONOCYTES 7 5 - 13 %    EOSINOPHILS 1 0 - 7 %    BASOPHILS 0 0 - 1 %    ABS. NEUTROPHILS 4.1 1.8 - 8.0 K/UL    ABS. LYMPHOCYTES 2.8 0.8 - 3.5 K/UL    ABS. MONOCYTES 0.5 0.0 - 1.0 K/UL    ABS. EOSINOPHILS 0.1 0.0 - 0.4 K/UL    ABS. BASOPHILS 0.0 0.0 - 0.1 K/UL   PROTHROMBIN TIME + INR   Result Value Ref Range    INR 1.1 0.9 - 1.1      Prothrombin time 10.9 9.0 - 01.8 sec   METABOLIC PANEL, COMPREHENSIVE   Result Value Ref Range    Sodium 140 136 - 145 mmol/L    Potassium 3.7 3.5 - 5.1 mmol/L    Chloride 104 97 - 108 mmol/L    CO2 29 21 - 32 mmol/L    Anion gap 7 5 - 15 mmol/L    Glucose 99 65 - 100 mg/dL    BUN 30 (H) 6 - 20 MG/DL    Creatinine 2.10 (H) 0.70 - 1.30 MG/DL    BUN/Creatinine ratio 14 12 - 20      GFR est AA 36 (L) >60 ml/min/1.73m2    GFR est non-AA 30 (L) >60 ml/min/1.73m2    Calcium 8.7 8.5 - 10.1 MG/DL    Bilirubin, total 0.4 0.2 - 1.0 MG/DL    ALT (SGPT) 43 12 - 78 U/L    AST (SGOT) 41 (H) 15 - 37 U/L    Alk.  phosphatase 116 45 - 117 U/L    Protein, total 7.0 6.4 - 8.2 g/dL    Albumin 3.3 (L) 3.5 - 5.0 g/dL    Globulin 3.7 2.0 - 4.0 g/dL    A-G Ratio 0.9 (L) 1.1 - 2.2     PTT   Result Value Ref Range    aPTT 27.3 22.1 - 32.5 sec    aPTT, therapeutic range     58.0 - 77.0 SECS   TROPONIN I   Result Value Ref Range    Troponin-I, Qt. 0.09 (H) <0.05 ng/mL   BLOOD GAS, ARTERIAL   Result Value Ref Range    pH 7.45 7.35 - 7.45      PCO2 31 (L) 35.0 - 45.0 mmHg    PO2 39 (LL) 80 - 100 mmHg    O2 SAT 76 (L) 92 - 97 %    BICARBONATE 21 (L) 22 - 26 mmol/L    BASE DEFICIT 1.5 mmol/L    O2 METHOD NRM      O2 FLOW RATE 15.00 L/min    FIO2 100 %    MODE OTHER      SPONTANEOUS RATE 26.0      Sample source ARTERIAL      SITE RIGHT RADIAL      LEOLA'S TEST YES      Critical value read back Dalila Menchaca RN    CBC WITH AUTOMATED DIFF   Result Value Ref Range    WBC 14.7 (H) 4.1 - 11.1 K/uL    RBC 4.94 4.10 - 5.70 M/uL    HGB 13.2 12.1 - 17.0 g/dL    HCT 41.2 36.6 - 50.3 %    MCV 83.4 80.0 - 99.0 FL    MCH 26.7 26.0 - 34.0 PG    MCHC 32.0 30.0 - 36.5 g/dL    RDW 15.1 (H) 11.5 - 14.5 %    PLATELET 355 (L) 708 - 400 K/uL    NEUTROPHILS 52 %    BAND NEUTROPHILS 19 %    LYMPHOCYTES 17 %    MONOCYTES 3 %    EOSINOPHILS 0 %    BASOPHILS 0 %    METAMYELOCYTES 9 %    ABS. NEUTROPHILS 10.4 K/UL    ABS. LYMPHOCYTES 2.5 K/UL    ABS. MONOCYTES 0.4 K/UL    ABS. EOSINOPHILS 0.0 K/UL    ABS.  BASOPHILS 0.0 K/UL    RBC COMMENTS NORMOCYTIC, NORMOCHROMIC      WBC COMMENTS TOXIC GRANULATION      DF MANUAL     METABOLIC PANEL, BASIC   Result Value Ref Range    Sodium 144 136 - 145 mmol/L    Potassium 3.8 3.5 - 5.1 mmol/L    Chloride 110 (H) 97 - 108 mmol/L    CO2 23 21 - 32 mmol/L    Anion gap 11 5 - 15 mmol/L    Glucose 90 65 - 100 mg/dL    BUN 33 (H) 6 - 20 MG/DL    Creatinine 2.34 (H) 0.70 - 1.30 MG/DL    BUN/Creatinine ratio 14 12 - 20      GFR est AA 32 (L) >60 ml/min/1.73m2    GFR est non-AA 26 (L) >60 ml/min/1.73m2    Calcium 8.4 (L) 8.5 - 10.1 MG/DL   PHOSPHORUS   Result Value Ref Range    Phosphorus 3.4 2.6 - 4.7 MG/DL   NUCLEATED RBC   Result Value Ref Range    NRBC 0.0 0  WBC    ABSOLUTE NRBC 0.00 0.00 - 0.01 K/uL   GLUCOSE, POC   Result Value Ref Range    Glucose (POC) 103 (H) 65 - 100 mg/dL    Performed by Jocy King    GLUCOSE, POC   Result Value Ref Range    Glucose (POC) 154 (H) 65 - 100 mg/dL    Performed by Luis F Barnes    GLUCOSE, POC   Result Value Ref Range    Glucose (POC) 160 (H) 65 - 100 mg/dL    Performed by Ely Ahr    GLUCOSE, POC   Result Value Ref Range    Glucose (POC) 167 (H) 65 - 100 mg/dL    Performed by Ely Ahr    GLUCOSE, POC   Result Value Ref Range    Glucose (POC) 172 (H) 65 - 100 mg/dL    Performed by [employer]    GLUCOSE, POC   Result Value Ref Range    Glucose (POC) 160 (H) 65 - 100 mg/dL    Performed by [employer]    GLUCOSE, POC   Result Value Ref Range    Glucose (POC) 132 (H) 65 - 100 mg/dL    Performed by Ely Ahr    GLUCOSE, POC   Result Value Ref Range    Glucose (POC) 99 65 - 100 mg/dL    Performed by Ely Ahr    EKG, 12 LEAD, INITIAL   Result Value Ref Range    Ventricular Rate 70 BPM    Atrial Rate 58 BPM    P-R Interval 192 ms    QRS Duration 116 ms    Q-T Interval 442 ms    QTC Calculation (Bezet) 477 ms    Calculated R Axis 41 degrees    Calculated T Axis -99 degrees    Diagnosis       Electronic ventricular pacemaker  Confirmed by Dilip Shine ([id number]) on 7/26/2017 9:32:48 AM          Imaging review:  See below. Stroke workup    24 HR CT head  Evolution of large left anterior and middle cerebral territory  infarcts. No hemorrhagic transformation. Mild mass effect on the ventricular  system without midline shift. No new areas of infarction. CT angiogram head and neck  1. Left middle cerebral artery branch occlusion. 2. Distal left anterior cerebral artery occlusion. 3. Acute infarction left superior posterior frontal lobe and suggested early  left lateral frontal lobe and insula infarction. TTE:   Left ventricle: Ejection fraction was estimated in the range of 50 % to 55  %. No obvious wall motion abnormalities identified in the views obtained.     Pending  Stroke labs:  HgBA1c    Lab Results   Component Value Date/Time    Hemoglobin A1c 5.6 11/03/2016 10:32 AM     LDL   Lab Results   Component Value Date/Time    LDL, calculated 134 11/03/2016 10:32 AM       HOME MEDS  Prior to Admission Medications   Prescriptions Last Dose Informant Patient Reported? Taking? Blood-Glucose Meter (FREESTYLE INSULINX) misc   No No   Sig: Test blood sugar once a week   Lancets (FREESTYLE LANCETS) misc   No No   Sig: Check blood sugar daily and record   atorvastatin (LIPITOR) 20 mg tablet   No No   Sig: Take 1 Tab by mouth daily. cholecalciferol, vitamin D3, (VITAMIN D3) 4,000 unit cap   No No   Sig: Take 4,000 Units by mouth daily. clotrimazole-betamethasone (LOTRISONE) topical cream   Yes No   Sig: APPLY AA BID   furosemide (LASIX) 40 mg tablet   No No   Sig: Take 1 Tab by mouth daily. glucose blood VI test strips (FREESTYLE INSULINX) strip   No No   Sig: Test blood sugar daily and record   metoprolol (LOPRESSOR) 25 mg tablet   No No   Sig: Take 1 Tab by mouth two (2) times a day. valsartan (DIOVAN) 80 mg tablet   No No   Sig: Take 1 Tab by mouth daily. Facility-Administered Medications: None       CURRENT MEDS  Current Facility-Administered Medications   Medication Dose Route Frequency    amiodarone (CORDARONE) 300 mg in dextrose 5% 250 mL infusion  1 mg/min IntraVENous CONTINUOUS    amiodarone (CORDARONE) 300 mg in dextrose 5% 250 mL infusion  0.5 mg/min IntraVENous CONTINUOUS    0.9% sodium chloride infusion  75 mL/hr IntraVENous CONTINUOUS    sodium chloride (NS) flush 5-10 mL  5-10 mL IntraVENous Q8H    mupirocin (BACTROBAN) 2 % ointment   Both Nostrils BID    albuterol-ipratropium (DUO-NEB) 2.5 MG-0.5 MG/3 ML  3 mL Nebulization QID RT    piperacillin-tazobactam (ZOSYN) 4.5 g in 0.9% sodium chloride (MBP/ADV) 100 mL  4.5 g IntraVENous Q8H    insulin lispro (HUMALOG) injection   SubCUTAneous Q6H       IMPRESSION:  Philip Begum is a 80 y.o. male who presents with altered mental status and right-sided weakness. 1. Left MCA and DENA infarct  2. A fib  3. HTN  4.  HLD    RECOMMENDATIONS:  - CT scan head and CT angiogram head and neck - L MCA and DENA occlusion and left frontal infarct  - 24 hr CT head did not show any hemorrhagic transformation  - TTE - Normal in May 2017  - Telemetry  - BP goal is less than 140/100  - Stroke labs (HgbA1c, TSH, lipid panel) - LDL is elevated  - Start  mg NH for 1 wk and after that he can be anticoagulated and ASA be stopped. - Start atorvastatin 40 mg daily when he can take PO meds  - ST/OT/PT eval    35 minutes critical care time provided.         Rocky Mckenzie MD  Neurologist

## 2017-07-26 NOTE — PROGRESS NOTES
PT note:    Chart reviewed and spoke with nursing. Patient not appropriate for PT evaluation as he is unresponsive and likely going to hospice. Will sign off.  Thank you for the referral.    Perla Liz, PT, DPT

## 2017-07-26 NOTE — PROGRESS NOTES
Chart reviewed and spoke with PT whom spoke with nursing. Patient not appropriate for OT evaluation as he is unresponsive and likely going to hospice. Will sign off.  Thank you for the referral.

## 2017-07-26 NOTE — PROGRESS NOTES
Attended family meeting with Palliative MD, Dr. Jessi Boswell; Palliative Judith Reynolds; pt's son, Destiny Garcia, and pt's wife, Mrs. Ewa Raymond. Provided calming presence as medical condition was discussed. Provided empathetic listening as son shared about his father's work in [employer] - through three wars and over 30 years. Provided assurance of continued prayer. Escorted family back to pt's room. Consuelo Miles will continue to follow up as possible. Anni Tilley M.Div.   Palliative  Fellow

## 2017-07-26 NOTE — PROGRESS NOTES
7/26/2017    INTENSIVIST PROGRESS NOTE:     Patient seen and evaluated. Admitted overnight with acute stroke, s/p TPA. Still with dense right hemiparesis. Had some respiratory distress but he vomited while on BiPAP. He remains obtunded today, with worsening oxygenation.     Visit Vitals    BP 97/49    Pulse 100    Temp 98.9 °F (37.2 °C)    Resp 19    Ht 5' 9\" (1.753 m)    Wt 61.5 kg (135 lb 9.3 oz)    SpO2 93%    BMI 20.02 kg/m2     GEN: ill appearing, mild respiratory distress  CV: RRR  Lungs: ronchi and rales  Neuro: dense right hemiparesis    Labs reviewed  Imaging reviewed    A/P:  - Acute right MCA stroke, s/p TPA - still with dense deficits and marked ischemia - neuro following  - aspiration pneumonia - O2 - bronchotilators, IV antibiotics   - DM - insulin  - DVT prophylaxis  - DNR/DNI - hospice would be appropriate, family meeting with palliative care this morning per report  Dorina Lundborg, MD

## 2017-07-26 NOTE — PROGRESS NOTES
Problem: Ischemic Stroke: Day 3  Goal: Nutrition/Diet  Outcome: Not Progressing Towards Goal  Remains NPO  Goal: Respiratory  Outcome: Not Progressing Towards Goal  Aspiration PNA present, dependent on 50% venti mask  Goal: *Tolerating diet  Outcome: Not Progressing Towards Goal  NPO  Goal: *Ability to perform ADLs and demonstrates progressive mobility and function  Outcome: Not Progressing Towards Goal  Very limited in movements

## 2017-07-26 NOTE — CONSULTS
Palliative Medicine Consult  Hung: [phone] ([phone])    Patient Name: Tyesha Cosme. YOB: 1925    Date of Initial Consult: 07/26/2017  Reason for Consult: care decisions  Requesting Provider: Dr. Bita Dunaway   Primary Care Physician: Syed Bryant MD      SUMMARY:   Tyesha Palumbo is a 80 y.o. gentleman w/ PMH most significant for A fib, systolic CHF, PM placement, HTN, HLD, DM2, h/o subdural hematoma who was admitted for ischemic stroke. Current medical issues leading to Palliative Medicine involvement include: care decisions in setting of  Extensive L MCA and L DENA ischemic stroke  SOB  Weakness  Debility     PALLIATIVE DIAGNOSES:   1. SOB  2. Weakness  3. Debility  4. Counseling regarding goals of care and advance care planning     PLAN:   Brief Summary of Plan  -visited w/ pt in his rm. Wife and son are at bedside  Clemente Joseph ([address]) and I visited from our team  Family meeting @ 80 was postponed by family to 200  -goals of care and advance care planning discussed  -we will continue to establish therapeutic alliance as well as provide psychosocial support    1. Goals of Care- based on our family meeting today (07/26/2017), it is assumed that son wishes for full, restorative treatment and all measures that support this w/ 1 exception:  NO to intubation if respiratory failure or distress outside of cardiopulmonary arrest    2. Advance Care Planning- pt's code status is DNAR/AND. Active EMR order reflects the above. \"DDNR\" form on file. Reviewed. AMD on file. Reviewed. 3. Information Sharing- son remembers Dr. Valdo Connolly speaking w/ them back in April. He asked what my specialty is and says he does not recall being told our role in their care.     After I provided info, they verbalized understanding of all the points made including the following:  -we are an interdisciplinary team serving as a bridge of communication and advocate on behalf of the patient and family when needed  -we are a support care service that, when needed, assists w/ sx mgmt  -though hospice is \"under\" palliative medicine, our service is not hospice nor does a consult visit by our service mean that hospice is being or should be considered  -we are not here to make medical decisions, and our being consulted does not equate to a change in a patient's overall condition  -our involvement is independent of clinical trajectory and/or medical decisions made    Family demonstrates good understanding of his condition and what we are doing medically. Son voices that if there is [place] radical treatment\" that can be done, he knows that his father would want to go through w/ it. Wife notes that pt was very independent including driving around prior to coming into the hospital.     We did not specifically discuss range of medical treatment nor code status. 4. Psychosocial Support- We will continue to support patient and family during this difficult hospitalization    5. Spiritual- at this time, there are no apparent spiritual needs or concerns. 6. Symptom Management- at this time, there does not appear to be symptom management for which our assistance is needed. I have discussed with patients bedside RN, Cally Latham. Time and input appreciated. I have discussed with Dr. Kiara Jensen, ICU attending. Time and input appreciated. I have discussed with our palliative care IDT. Thank you for this consult that has provided us with the opportunity to be involved in this patient's care. We will continue to follow along with you. Should you have any questions or concerns prior to our next visit at the bedside, please do not hesitate to contact us at [phone]     mEma Silva MD  Palliative Care Team     GOALS OF CARE / TREATMENT PREFERENCES:   [====Goals of Care====]  GOALS OF CARE:  Patient / health care proxy stated goals: full, restorative treatment and all measures that support this w/ 1 exception:  NO to intubation if respiratory failure or distress outside of cardiopulmonary arrest  TREATMENT PREFERENCES:   Code Status: DNR   Pt's code status is DO NOT ATTEMPT RESUSCITATION (DNAR) / ALLOW NATURAL DEATH (AND). Advance Care Planning:  Advance Care Planning 7/25/2017   Patient's Healthcare Decision Maker is: Named in scanned ACP document   Primary Decision Maker Name Evonne Roberts III   Primary Decision Maker Phone Number [phone]   Primary Decision Maker Relationship to Patient Adult child   Confirm Advance Directive Yes, on file   Does the patient have other document types Do Not Resuscitate     Other:    The palliative care team has discussed with patient / health care proxy about goals of care / treatment preferences for patient.  [====Goals of Care====]     HISTORY:     History obtained from: family, chart    CHIEF COMPLAINT: pt unable to communicate cc    HPI/SUBJECTIVE:    The patient is:   [x] Verbal and participatory  [] Non-participatory due to:     Pt is a 81 y/o AAM who presented to our ED unresponsive. Pt was apparently found by his wife when she came home to be tired and slow w/ his speech. Pt was on the couch when he became unresponsive after slumping over. EMS was called. Pt was brought to our ED and Code S was called. Pt was found to have R sided hemiparesis and was given TPA. Pt was admitted to our ICU.     Clinical Pain Assessment (nonverbal scale for severity on nonverbal patients):   [++++ Clinical Pain Assessment++++]  [++++Pain Severity++++]: Pain: 0  [++++Pain Character++++]:   [++++Pain Duration++++]:   [++++Pain Effect++++]:   [++++Pain Factors++++]:   [++++Pain Frequency++++]:   [++++Pain Location++++]:   [++++ Clinical Pain Assessment++++]     FUNCTIONAL ASSESSMENT:     Palliative Performance Scale (PPS):  PPS: 20       PSYCHOSOCIAL/SPIRITUAL SCREENING:     Advance Care Planning:  Advance Care Planning 7/25/2017   Patient's Healthcare Decision Maker is: Named in scanned ACP document   Primary Decision Maker Name Nisha Calixto III   Primary Decision Maker Phone Number [phone]   Primary Decision Maker Relationship to Patient Adult child   Confirm Advance Directive Yes, on file   Does the patient have other document types Do Not Resuscitate        Any spiritual / Jehovah's witness concerns:  [] Yes /  [x] No    Caregiver Burnout:  [] Yes /  [x] No /  [] No Caregiver Present      Anticipatory grief assessment:   [x] Normal  / [] Maladaptive        ESAS Anxiety:      ESAS Depression:          REVIEW OF SYSTEMS:     Positive and pertinent negative findings in ROS are noted above in HPI. The following systems were [] reviewed / [x] unable to be reviewed as noted in HPI  Other findings are noted below. Systems: constitutional, ears/nose/mouth/throat, respiratory, gastrointestinal, genitourinary, musculoskeletal, integumentary, neurologic, psychiatric, endocrine. Positive findings noted below. Modified ESAS Completed by: provider           Pain: 0           Dyspnea: 0                    PHYSICAL EXAM:     From RN flowsheet:  Wt Readings from Last 3 Encounters:   07/26/17 135 lb 9.3 oz (61.5 kg)   07/13/17 136 lb (61.7 kg)   07/06/17 136 lb (61.7 kg)     Blood pressure (!) 112/37, pulse (!) 112, temperature 98.5 °F (36.9 °C), resp. rate 24, height 5' 9\" (1.753 m), weight 135 lb 9.3 oz (61.5 kg), SpO2 91 %.     Pain Scale 1: Adult Nonverbal Pain Scale  Pain Intensity 1: 0              Pain Intervention(s) 1: Medication (see MAR), MD notified (comment) (Gave Halodol)    Gen: in NAD  HEENT: NC/AT, MMM, Venti mask securely in place  Respiratory: breathing not labored, symmetric  Skin: warm, dry  Neurologic: awake, does not follow commands  Moves LUE spontaneously  Psychiatric: limited exam d/t condition  No obvious signs of agitation  Unable to demonstrate capacity      HISTORY:     Active Problems:    PAF (paroxysmal atrial fibrillation) ([facility] (8/6/2013)      CVA (cerebral vascular accident) [facility] (7/24/2017)      Aspiration into airway (7/25/2017)      Past Medical History:   Diagnosis Date    Arrhythmia     Arthritis     Carpal tunnel syndrome 2006    CKD (chronic kidney disease) 3/2/2017    DM type 2 (diabetes mellitus, type 2) ([facility] 10/28/2009    ED (erectile dysfunction) 10/28/2009    HTN (hypertension) 10/28/2009    Hyperlipidemia 10/28/2009    Neuropathy ([facility] 10/28/2009    Other ill-defined conditions     hyperlipidemia    Pacemaker     SDH [facility]subdural hematoma) [facility]     Systolic CHF ([facility] 86/64/0389    Vertigo 2007      Past Surgical History:   Procedure Laterality Date    HX PACEMAKER      NEUROLOGICAL PROCEDURE UNLISTED  1999    evacuation SDH; At [address] [place] [address]. Family History   Problem Relation Age of Onset    Stroke Mother     Pneumonia Father     MS Child     Cancer Child      breast    Hearing Impairment Child       History reviewed, no pertinent family history.   Social History   Substance Use Topics    Smoking status: Never Smoker    Smokeless tobacco: Never Used    Alcohol use No     Allergies   Allergen Reactions    Lisinopril Swelling     angioedema      Current Facility-Administered Medications   Medication Dose Route Frequency    amiodarone (CORDARONE) 300 mg in dextrose 5% 250 mL infusion  0.5 mg/min IntraVENous CONTINUOUS    piperacillin-tazobactam (ZOSYN) 4.5 g in 0.9% sodium chloride (MBP/ADV) 100 mL  4.5 g IntraVENous Q12H    haloperidol lactate (HALDOL) 5 mg/mL injection        haloperidol lactate (HALDOL) injection 4 mg  4 mg IntraVENous Q4H PRN    sodium chloride 0.9 % bolus infusion 1,000 mL  1,000 mL IntraVENous ONCE    labetalol (NORMODYNE;TRANDATE) injection 10 mg  10 mg IntraVENous Q2H PRN    albuterol-ipratropium (DUO-NEB) 2.5 MG-0.5 MG/3 ML  3 mL Nebulization Q2H PRN    0.9% sodium chloride infusion  75 mL/hr IntraVENous CONTINUOUS    glucose chewable tablet 16 g  4 Tab Oral PRN    glucagon (GLUCAGEN) injection 1 mg  1 mg IntraMUSCular PRN    sodium chloride (NS) flush 5-10 mL  5-10 mL IntraVENous Q8H    sodium chloride (NS) flush 5-10 mL  5-10 mL IntraVENous PRN    sodium chloride (NS) flush 5-10 mL  5-10 mL IntraVENous PRN    dextrose 10% infusion 125-250 mL  125-250 mL IntraVENous PRN    mupirocin (BACTROBAN) 2 % ointment   Both Nostrils BID    albuterol-ipratropium (DUO-NEB) 2.5 MG-0.5 MG/3 ML  3 mL Nebulization QID RT    insulin lispro (HUMALOG) injection   SubCUTAneous Q6H    sodium chloride (NS) flush 5-10 mL  5-10 mL IntraVENous PRN    sodium chloride (NS) flush 5 mL  5 mL IntraVENous PRN          LAB AND IMAGING FINDINGS:     Lab Results   Component Value Date/Time    WBC 14.7 07/26/2017 04:44 AM    HGB 13.2 07/26/2017 04:44 AM    PLATELET 120 81/81/8560 04:44 AM     Lab Results   Component Value Date/Time    Sodium 144 07/26/2017 04:44 AM    Potassium 3.8 07/26/2017 04:44 AM    Chloride 110 07/26/2017 04:44 AM    CO2 23 07/26/2017 04:44 AM    BUN 33 07/26/2017 04:44 AM    Creatinine 2.34 07/26/2017 04:44 AM    Calcium 8.4 07/26/2017 04:44 AM    Magnesium 2.2 06/29/2017 12:10 PM    Phosphorus 3.4 07/26/2017 04:44 AM      Lab Results   Component Value Date/Time    AST (SGOT) 41 07/24/2017 08:11 PM    Alk.  phosphatase 116 07/24/2017 08:11 PM    Protein, total 7.0 07/24/2017 08:11 PM    Albumin 3.3 07/24/2017 08:11 PM    Globulin 3.7 07/24/2017 08:11 PM     Lab Results   Component Value Date/Time    INR 1.1 07/24/2017 08:11 PM    Prothrombin time 10.9 07/24/2017 08:11 PM    aPTT 27.3 07/24/2017 08:11 PM      No results found for: IRON, FE, TIBC, IBCT, PSAT, FERR   Lab Results   Component Value Date/Time    pH 7.45 07/25/2017 02:15 AM    PCO2 31 07/25/2017 02:15 AM    PO2 39 07/25/2017 02:15 AM     No components found for: [place]   Lab Results   Component Value Date/Time     04/09/2017 04:49 AM    CK - MB 2.5 04/09/2017 04:49 AM                Total time: 75 min  Counseling / coordination time, spent as noted above: 60 min  > 50% counseling / coordination?: yes    Prolonged service was provided for  []30 min   []75 min in face to face time in the presence of the patient, spent as noted above. Time Start:   Time End:   Note: this can only be billed with 42809 (initial) or 51209 (follow up). If multiple start / stop times, list each separately.

## 2017-07-26 NOTE — PROGRESS NOTES
Palliative Medicine     IDT with Palliative team this morning. Spoke with RN Sophia Cueto). LCSW spoke with Pt's son/mpoa Nancy Driftwood III) and he is delayed at work so rescheduled meeting for 12:30 pm today.  Fellow Lencho Corporal visiting with Pt's wife    Addendum 13:40: Dr. Huynh Loser Fellow Kimi Escobedo, and this LCSW met with Pt's son/mpoa Nancy Murdock III) and Pt's wife (Mrs. Sarabjit Kelly) for goc conversation. Family members are adaptive anticipatory grieving Pt's critical situation. Pt's son spoke about his father's 35 years in [employer] during [address] [place] [address] ([facility]) and how he interprets his eye communication. Pt's mpoa said he wants \"radical\" interventions to address the stroke and Dr. Justine Guallpa spoke about TPA being an intervention. Pt's wife is processing Pt's condition and high likelihood Pt will not return to overall prior level of functioning. Per wife and son, Pt was ambulating, highly intelligent, and driving. Plan:  Palliative team met with Pt's mpoa/son and Pt's spouse today for goals of care conversation. DNAR/AND. Per Pt's DDNR order from April 2017. AMD on file appointing Pt's son, Sami Varghese, as mpoa. Thank you for including Palliative Medicine in Mr. Sarabjit Kelly Jr.'s care.       Juan M Zhao, [address] ([id number])  Work cell: [phone]

## 2017-07-26 NOTE — INTERDISCIPLINARY ROUNDS
Interdisciplinary team rounds were held 7/26/17 with the following team members:Care Management, Diabetes Treatment Specialist, Nursing, Nutrition, Occupational Therapy, Pastoral Care, Pharmacy, Physician, Respiratory Therapy and Clinical Coordinator. Plan of care discussed. Goal: See MD orders and progress notes for further  interventions and desired outcomes.

## 2017-07-26 NOTE — PROGRESS NOTES
Speech pathology note  Reviewed chart and discussed case with RN who reports patient remains inappropriate for swallowing evaluation. Per chart review, patient is appropriate for hospice and family is meeting with palliative care today. Will sign off. Please re-consult if further needs arise. Thank you.     Colten Moore., CCC-SLP

## 2017-07-26 NOTE — PROGRESS NOTES
Spiritual Care Assessment/Progress Notes    Franci Borges [id number]  [id number]    8/5/1925  80 y.o.  male    Patient Telephone Number: [phone] (home)   Episcopal Affiliation: Stew Acuña   Language: English   Extended Emergency Contact Information  Primary Emergency Contact: Uzma Oakes Phone: [phone]  Relation: Child  Secondary Emergency Contact: Denny Leos  Home Phone: [phone]  Relation: Child   Patient Active Problem List    Diagnosis Date Noted    Aspiration into airway 07/25/2017    [facility] 07/24/2017    Shortness of breath 04/10/2017    ACP (advance care planning) 04/10/2017    Goals of care, counseling/discussion 04/10/2017    Edema 04/09/2017    Type 2 DM with CKD stage 3 and hypertension ([facility] 40/19/7800    Systolic CHF, acute ([facility] 04/09/2017    CKD (chronic kidney disease) 03/02/2017    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 infarction 01/29/2016    Diabetic peripheral neuropathy associated with type 2 diabetes mellitus ([facility] 01/29/2016    Idiopathic small and large fiber sensory neuropathy 01/29/2016    Dizziness and giddiness 07/30/2015    Memory loss 07/30/2015    SVT (supraventricular tachycardia) ([facility] 12/10/2013    Pacemaker 08/21/2013    SSS [facility]sick [facility] syndrome[facility] 08/06/2013    PVC (premature ventricular contraction) 08/06/2013    PAF (paroxysmal atrial fibrillation) ([facility] 08/06/2013    Vertigo 02/16/2012    Vitamin D deficiency 11/30/2009    DM type 2 (diabetes mellitus, type 2) ([facility] 10/28/2009    HTN (hypertension) 10/28/2009    Hyperlipidemia 10/28/2009    ED (erectile dysfunction) 10/28/2009        Date: 7/26/2017       Level of Episcopal/Spiritual Activity: (according to & including wife)  [x]         Involved in pedro tradition/spiritual practice    []         Not involved in pedro tradition/spiritual practice  [x]         Spiritually oriented    []         Claims no spiritual orientation    []         seeking spiritual identity  []         Feels alienated from Confucianism practice/tradition  []         Feels angry about Confucianism practice/tradition  [x]         Spirituality/Confucianism tradition is a resource for coping at this time. []         Not able to assess due to medical condition    Services Provided Today:  []         crisis intervention    []         reading Scriptures  [x]         spiritual assessment    [x]         prayer  [x]         empathic listening/emotional support  []         rites and rituals (cite in comments)  []         life review     []         Confucianism support  []         theological development   []         advocacy  []         ethical dialog     []         blessing  []         bereavement support    [x]         support to family  []         anticipatory grief support   []         help with AMD  []         spiritual guidance    []         meditation      Spiritual Care Needs  [x]         Emotional Support  [x]         Spiritual/Episcopal Care  []         Loss/Adjustment  []         Advocacy/Referral                /Ethics  []         No needs expressed at               this time  []         Other: (note in               comments)  [address]  []         Follow up visits with               pt/family  []         Provide materials  []         Schedule sacraments  []         Contact Community               Clergy  [x]         Follow up as needed  []         Other: (note in               comments)     Comments: Wife at bedside in preparation for family meeting. Family meeting was delayed so  took time to connect/build relationship/offer support to wife. Provided active listening as Mrs. Can Nieto confirmed that she has been a member of [employer] and that she met Mr. Can Nieto at [employer]. Wife shared that she has a good relationship with Mr. Kenneth Marrero son (MPOA).   Wife shared that she has her Denominational family, as well as other family and friends to help support her. We reviewed good self-care. She finds great power and peace in prayer. Provided prayer at bedside with pt and wife.  encouraged Mrs. Mckinley Smoke to speak to her  even if he can't respond appropriately at this time; reminding wife that pt may take solace in hearing her voice. Pt started becoming more active; picking at gown and his mask; opening eyes.  stepped out to inform RN and assured wife that RN would come in and check on pt.  stepped out at this time -- assuring wife of the intention of returning for the family meeting. Richelle Ortiz M.Div.   Palliative  Fellow

## 2017-07-26 NOTE — PROGRESS NOTES
0700-Bedside and Verbal shift change report given to Isaura Charles RN (oncoming nurse) by [employer], RN (offgoing nurse). Report included the following information SBAR, Kardex, ED Summary, Procedure Summary, Intake/Output, MAR, Recent Results and Cardiac Rhythm Afib RVR.     0800-Dr. Murdock updated on Head CT results from last evening. Palliative meet at 1100 today. 0900-Dr. Bryanna Thibodeaux aware of Afib with frequent RVR. Amiodarone drip ordered. 1000-Patient now withdrawing in right extermeties (which he wasn't doing earlier). Dr. Emmanuel Murillo aware. 1015-Neuro checks Q2h per Dr. Emmanuel Murillo. He updated patient's wife about the CVA on the head CT.    1100-No IV started for Amiodarone to run through. 1230-Patient's son (Josh) and wife currently having a meeting with Aiden Wilson. 1250-Patient more awake and appears to be following simple commands such has \"lift your left arm off the bed and hold it\" \"now drop it\". Doesn't appear to have the dexterity to hold up 2 fingers or hold up his thumb.    1600-Patient has pulled out 1 IV, removed his gown, SCDs, and his Ventimask which results in him sating in the low 80s. Applied 6L NC. Sating 89%. Patient still following commands to raise and lower left arm and neuro assessment has not changed other than him removing everything. Anice Arms turned on in room, new IV started, sleeve placed on right arm to prevent further removal of IVs/pulse ox. Notified Dr. Saadia Ford. Order for 2mg of Halodol received and dose given. 1645-Paged Dr. Saadia Ford to get an order for a higher dose of Halodol. Patient still pulling of mask and very restless. 1710-Patient now hypotensive SBP in the 60's with a repeat of 72. Paged Dr. Saadia Ford. 1715-Notified Dr. Saadia Ford of hypotension. Patient still midly restless, but will hold off on giving 2nd dose of Halodol and will give a 1L bolus per Dr. Saadia Ford. 1815-SBP is responding to bolus and is currently 88.

## 2017-07-26 NOTE — PROGRESS NOTES
S: Mr. Brooke Priest. was seen by me today during rounds. At this time, he is lying in bed. He is awake, but not verbalizing. Not moving R side. ROS not obtainable at this time. O: Blood pressure 97/49, pulse 100, temperature 98.9 °F (37.2 °C), resp. rate 19, height 5' 9\" (1.753 m), weight 135 lb 9.3 oz (61.5 kg), SpO2 93 %. Lungs: CTAB. Heart: RRR. Abd: S, NT, ND, BS present. Extremities: Warm. Neuro: R hemiparesis. Recent Results (from the past 12 hour(s))   GLUCOSE, POC    Collection Time: 07/26/17 12:01 AM   Result Value Ref Range    Glucose (POC) 132 (H) 65 - 100 mg/dL    Performed by Melinda Rosado    CBC WITH AUTOMATED DIFF    Collection Time: 07/26/17  4:44 AM   Result Value Ref Range    WBC 14.7 (H) 4.1 - 11.1 K/uL    RBC 4.94 4.10 - 5.70 M/uL    HGB 13.2 12.1 - 17.0 g/dL    HCT 41.2 36.6 - 50.3 %    MCV 83.4 80.0 - 99.0 FL    MCH 26.7 26.0 - 34.0 PG    MCHC 32.0 30.0 - 36.5 g/dL    RDW 15.1 (H) 11.5 - 14.5 %    PLATELET 422 (L) 678 - 400 K/uL    NEUTROPHILS 52 %    BAND NEUTROPHILS 19 %    LYMPHOCYTES 17 %    MONOCYTES 3 %    EOSINOPHILS 0 %    BASOPHILS 0 %    METAMYELOCYTES 9 %    ABS. NEUTROPHILS 10.4 K/UL    ABS. LYMPHOCYTES 2.5 K/UL    ABS. MONOCYTES 0.4 K/UL    ABS. EOSINOPHILS 0.0 K/UL    ABS.  BASOPHILS 0.0 K/UL    RBC COMMENTS NORMOCYTIC, NORMOCHROMIC      WBC COMMENTS TOXIC GRANULATION      DF MANUAL     METABOLIC PANEL, BASIC    Collection Time: 07/26/17  4:44 AM   Result Value Ref Range    Sodium 144 136 - 145 mmol/L    Potassium 3.8 3.5 - 5.1 mmol/L    Chloride 110 (H) 97 - 108 mmol/L    CO2 23 21 - 32 mmol/L    Anion gap 11 5 - 15 mmol/L    Glucose 90 65 - 100 mg/dL    BUN 33 (H) 6 - 20 MG/DL    Creatinine 2.34 (H) 0.70 - 1.30 MG/DL    BUN/Creatinine ratio 14 12 - 20      GFR est AA 32 (L) >60 ml/min/1.73m2    GFR est non-AA 26 (L) >60 ml/min/1.73m2    Calcium 8.4 (L) 8.5 - 10.1 MG/DL   PHOSPHORUS    Collection Time: 07/26/17  4:44 AM   Result Value Ref Range Phosphorus 3.4 2.6 - 4.7 MG/DL   NUCLEATED RBC    Collection Time: 07/26/17  4:44 AM   Result Value Ref Range    NRBC 0.0 0  WBC    ABSOLUTE NRBC 0.00 0.00 - 0.01 K/uL   GLUCOSE, POC    Collection Time: 07/26/17  6:36 AM   Result Value Ref Range    Glucose (POC) 99 65 - 100 mg/dL    Performed by Flavio Morris         A / P: Active Problems:  1. CVA (cerebral vascular accident) ([facility] (7/24/2017): He is in ICU, s/p tPA. Neurology following. 2. PAF (paroxysmal atrial fibrillation) ([facility] (8/6/2013): Rate has been high today. Cardiology following. 3. Aspiration into airway (7/25/2017) with hypoxic resp failure: On oxygen. 4. DM type 2 (diabetes mellitus, type 2) ([facility] (10/28/2009): SSI. Has needed D50 on admission due to hypoglycemia. 5. CKD (chronic kidney disease) (3/2/2017)  6. HTN (hypertension) (10/28/2009): BP running low at times. Hold BP meds. Pressors if needed, unless declined by family. 7. DNR / DNI. Palliative care to meet with family.

## 2017-07-26 NOTE — PROGRESS NOTES
[address]  [phone]      Cardiology Progress Note      7/26/2017 9:00 AM    Admit Date: 7/24/2017    Admit Diagnosis:   CVA (cerebral vascular accident) [facility])    Subjective:     Opal Thorne is a 80 y.o. male with PMH HLD, DM, HTN, CKD, PAF, sHF, SSS s/p pacemaker, SVT s/p ablation, subdural hematoma who was admitted for CVA (cerebral vascular accident) ([facility] Overnight events:  -started having a-fib with worsening hypotension during bursts  -creat 2.34, WBC increase to 14.7    -Mr. Tamara Quijano has his eyes open today, but is not following commands or speaking.       Visit Vitals    /59    Pulse (!) 127    Temp 98.9 °F (37.2 °C)    Resp 27    Ht 5' 9\" (1.753 m)    Wt 61.5 kg (135 lb 9.3 oz)    SpO2 95%    BMI 20.02 kg/m2       Current Facility-Administered Medications   Medication Dose Route Frequency    amiodarone (CORDARONE) 150 mg in dextrose 5% 100 mL bolus infusion  150 mg IntraVENous NOW    amiodarone (CORDARONE) 300 mg in dextrose 5% 250 mL infusion  1 mg/min IntraVENous CONTINUOUS    amiodarone (CORDARONE) 300 mg in dextrose 5% 250 mL infusion  0.5 mg/min IntraVENous CONTINUOUS    labetalol (NORMODYNE;TRANDATE) injection 10 mg  10 mg IntraVENous Q2H PRN    albuterol-ipratropium (DUO-NEB) 2.5 MG-0.5 MG/3 ML  3 mL Nebulization Q2H PRN    0.9% sodium chloride infusion  75 mL/hr IntraVENous CONTINUOUS    glucose chewable tablet 16 g  4 Tab Oral PRN    glucagon (GLUCAGEN) injection 1 mg  1 mg IntraMUSCular PRN    sodium chloride (NS) flush 5-10 mL  5-10 mL IntraVENous Q8H    sodium chloride (NS) flush 5-10 mL  5-10 mL IntraVENous PRN    sodium chloride (NS) flush 5-10 mL  5-10 mL IntraVENous PRN    dextrose 10% infusion 125-250 mL  125-250 mL IntraVENous PRN    mupirocin (BACTROBAN) 2 % ointment   Both Nostrils BID    albuterol-ipratropium (DUO-NEB) 2.5 MG-0.5 MG/3 ML  3 mL Nebulization QID RT    piperacillin-tazobactam (ZOSYN) 4.5 g in 0.9% sodium chloride (MBP/ADV) 100 mL  4.5 g IntraVENous Q8H    insulin lispro (HUMALOG) injection   SubCUTAneous Q6H    sodium chloride (NS) flush 5-10 mL  5-10 mL IntraVENous PRN    sodium chloride (NS) flush 5 mL  5 mL IntraVENous PRN       Objective:      Physical Exam:  General: elderly AAM resting in bed in NAD with eyes open, but not responding to questions or commands  Heart: RRR, no m/S3/JVD  Lungs: coarse rhonchi bilaterally   Abdomen: Soft, +BS, NTND   Extremities: LE js +DP/PT, no edema   Neurologic: minimal response. Moves LUE and LLE spontaneously. No movement noted to RUE. Skin:  Warm and dry.     Data Review:   Recent Labs      07/26/17 0444  07/24/17 2011   WBC  14.7*  7.4   HGB  13.2  13.3   HCT  41.2  41.4   PLT  148*  182     Recent Labs      07/26/17 0444  07/24/17 2011   NA  144  140   K  3.8  3.7   CL  110*  104   CO2  23  29   GLU  90  99   BUN  33*  30*   CREA  2.34*  2.10*   CA  8.4*  8.7   PHOS  3.4   --    ALB   --   3.3*   TBILI   --   0.4   SGOT   --   41*   ALT   --   43   INR   --   1.1       Recent Labs      07/24/17 2011   TROIQ  0.09*         Intake/Output Summary (Last 24 hours) at 07/26/17 0954  Last data filed at 07/26/17 0900   Gross per 24 hour   Intake             2200 ml   Output              660 ml   Net             1540 ml        Telemetry: a-fib; Some pacing overnight    ECG: AV pacing with ? PVCs  CXRAY: (7/24) no acute process                (7/25) \"New mild pulmonary edema with right pleural effusion. \"  CT head: (7/26): \"Evolution of large left anterior and middle cerebral territory  infarcts. No hemorrhagic transformation. Mild mass effect on the ventricular  system without midline shift. No new areas of infarction. \"    Assessment:     Active Problems:    PAF (paroxysmal atrial fibrillation) ([facility] (8/6/2013)      CVA (cerebral vascular accident) ([facility] (7/24/2017)      Aspiration into airway (7/25/2017)        Plan:     PAF: with pacemaker.   In a-fib this morning with hypotension. Pacemaker interrogation showed a -fib from 7/17 until 7/23. TPA would have dissolved any remaining clots. Interrogation showed no signs of WCT. That was likely an a-fib abberancy or pacing. Was not started on [facility] out-patient due to history of subdural and advanced age/fall risk. CHADSVASC=7. HASBLED=3.    · Add amio this morning to help keep patient in a sinus rhythm to assist with hemodynamic stability. · Per neurology if/when patient would be able to receive anticoagulation for PAF. Repeat head CT as above. · BP and HF meds on hold for hypotension  · Aspiration per hospitalist and intensivist      I see plans for palliative meeting with family this morning.         Klarissa Dickens NP  DNP, RN, AGACNP-BC

## 2017-07-27 NOTE — PROGRESS NOTES
NEUROLOGY NOTE         Chief Complaint   Patient presents with    Unresponsive     SUBJECTIVE   - No overnight issues  - CT head at 24 hr did not show any bleed    HISTORY OF PRESENT ILLNESS  Gela Bloom is a 80 y.o. male who presents to the hospital because of A stroke. The patient was last known well at 5 PM.  When the wife came home at around 4 PM the patient was tired and slow in speech. He was not feeling great at that time. Patient was on the couch when he became little to not responsive. The patient was slumped over and EMS was called for an evaluation. The patient was brought to the hospital and Code S was called. The patient was found to have right hemiparesis and patient was given TPA as the patient was within the TPA window. The patient has been admitted to the ICU for observation. The patient did have recent check of the pacemaker which showed atrial fibrillation. The patient is also have history of subdural hematoma.     ROS  A ten system review of constitutional, cardiovascular, respiratory, musculoskeletal, endocrine, skin, SHEENT, genitourinary, psychiatric and neurologic systems was obtained and is unremarkable except as stated in HPI     PHYSICAL EXAM  EXAMINATION:   Patient Vitals for the past 24 hrs:   Temp Pulse Resp BP SpO2   07/27/17 1424 - (!) 122 24 - (!) 79 %   07/27/17 1400 - - - 150/90 (!) 78 %   07/27/17 1200 - (!) 112 (!) 34 105/62 (!) 84 %   07/27/17 1135 97.7 °F (36.5 °C) (!) 117 (!) 32 - (!) 87 %   07/27/17 1129 - - - - (!) 88 %   07/27/17 1000 - (!) 129 (!) 33 94/49 (!) 89 %   07/27/17 0900 - (!) 133 (!) 35 118/54 (!) 85 %   07/27/17 0835 - - - - (!) 88 %   07/27/17 0825 99 °F (37.2 °C) (!) 115 (!) 33 - (!) 89 %   07/27/17 0800 99 °F (37.2 °C) (!) 141 (!) 37 - 91 %   07/27/17 0700 - (!) 124 29 106/61 (!) 88 %   07/27/17 0600 - (!) 119 27 - 90 %   07/27/17 0500 - (!) 128 30 106/61 (!) 86 %   07/27/17 0419 - - - - 91 %   07/27/17 0400 98.2 °F (36.8 °C) (!) 140 (!) 36 97/55 93 %   07/27/17 0300 - (!) 122 29 138/79 (!) 81 %   07/27/17 0200 - (!) 115 28 131/58 92 %   07/27/17 0100 - (!) 140 (!) 34 154/64 91 %   07/27/17 0000 98.2 °F (36.8 °C) (!) 123 30 119/56 92 %   07/26/17 2300 - (!) 117 26 105/55 (!) 88 %   07/26/17 2200 - (!) 112 27 111/55 96 %   07/26/17 2100 - (!) 114 24 (!) 108/36 94 %   07/26/17 2000 98.1 °F (36.7 °C) (!) 118 22 (!) 145/111 93 %   07/26/17 1929 - - - - 93 %   07/26/17 1900 - (!) 124 (!) 31 133/82 94 %   07/26/17 1819 - (!) 102 24 (!) 88/75 92 %   07/26/17 1709 - - - (!) 72/58 -   07/26/17 1600 - (!) 120 25 110/52 90 %   07/26/17 1553 - - - - 91 %        General:   General appearance: Pt is in no acute distress   Distal pulses are preserved    Neurological Examination:   Mental Status: Pt is awake and not following commands. Cranial Nerves: Visual fields are full to blink to threat . PERRL, Facial movement intact, symmetric. Motor: RUE and RLE - does not withdraws to painful stimuli    Sensation: Hard to assess because of mental status    Coordination/Cerebellar: Unable to perform    Gait: Unable to perform    Skin: No significant bruising or lacerations.     LAB DATA REVIEWED:    Results for orders placed or performed during the hospital encounter of 07/24/17   LACTIC ACID   Result Value Ref Range    Lactic acid 1.3 0.4 - 2.0 MMOL/L   URINALYSIS W/ REFLEX CULTURE   Result Value Ref Range    Color YELLOW/STRAW      Appearance CLEAR CLEAR      Specific gravity 1.010 1.003 - 1.030      pH (UA) 7.0 5.0 - 8.0      Protein 30 (A) NEG mg/dL    Glucose NEGATIVE  NEG mg/dL    Ketone NEGATIVE  NEG mg/dL    Bilirubin NEGATIVE  NEG      Blood TRACE (A) NEG      Urobilinogen 1.0 0.2 - 1.0 EU/dL    Nitrites NEGATIVE  NEG      Leukocyte Esterase NEGATIVE  NEG      UA:UC IF INDICATED CULTURE NOT INDICATED BY UA RESULT CNI      WBC 0-4 0 - 4 /hpf    RBC 5-10 0 - 5 /hpf    Epithelial cells FEW FEW /lpf    Bacteria NEGATIVE  NEG /hpf    Hyaline cast 2-5 0 - 5 /lpf CBC WITH AUTOMATED DIFF   Result Value Ref Range    WBC 7.4 4.1 - 11.1 K/uL    RBC 4.95 4.10 - 5.70 M/uL    HGB 13.3 12.1 - 17.0 g/dL    HCT 41.4 36.6 - 50.3 %    MCV 83.6 80.0 - 99.0 FL    MCH 26.9 26.0 - 34.0 PG    MCHC 32.1 30.0 - 36.5 g/dL    RDW 14.9 (H) 11.5 - 14.5 %    PLATELET 891 586 - 045 K/uL    NEUTROPHILS 55 32 - 75 %    LYMPHOCYTES 37 12 - 49 %    MONOCYTES 7 5 - 13 %    EOSINOPHILS 1 0 - 7 %    BASOPHILS 0 0 - 1 %    ABS. NEUTROPHILS 4.1 1.8 - 8.0 K/UL    ABS. LYMPHOCYTES 2.8 0.8 - 3.5 K/UL    ABS. MONOCYTES 0.5 0.0 - 1.0 K/UL    ABS. EOSINOPHILS 0.1 0.0 - 0.4 K/UL    ABS. BASOPHILS 0.0 0.0 - 0.1 K/UL   PROTHROMBIN TIME + INR   Result Value Ref Range    INR 1.1 0.9 - 1.1      Prothrombin time 10.9 9.0 - 40.5 sec   METABOLIC PANEL, COMPREHENSIVE   Result Value Ref Range    Sodium 140 136 - 145 mmol/L    Potassium 3.7 3.5 - 5.1 mmol/L    Chloride 104 97 - 108 mmol/L    CO2 29 21 - 32 mmol/L    Anion gap 7 5 - 15 mmol/L    Glucose 99 65 - 100 mg/dL    BUN 30 (H) 6 - 20 MG/DL    Creatinine 2.10 (H) 0.70 - 1.30 MG/DL    BUN/Creatinine ratio 14 12 - 20      GFR est AA 36 (L) >60 ml/min/1.73m2    GFR est non-AA 30 (L) >60 ml/min/1.73m2    Calcium 8.7 8.5 - 10.1 MG/DL    Bilirubin, total 0.4 0.2 - 1.0 MG/DL    ALT (SGPT) 43 12 - 78 U/L    AST (SGOT) 41 (H) 15 - 37 U/L    Alk.  phosphatase 116 45 - 117 U/L    Protein, total 7.0 6.4 - 8.2 g/dL    Albumin 3.3 (L) 3.5 - 5.0 g/dL    Globulin 3.7 2.0 - 4.0 g/dL    A-G Ratio 0.9 (L) 1.1 - 2.2     PTT   Result Value Ref Range    aPTT 27.3 22.1 - 32.5 sec    aPTT, therapeutic range     58.0 - 77.0 SECS   TROPONIN I   Result Value Ref Range    Troponin-I, Qt. 0.09 (H) <0.05 ng/mL   BLOOD GAS, ARTERIAL   Result Value Ref Range    pH 7.45 7.35 - 7.45      PCO2 31 (L) 35.0 - 45.0 mmHg    PO2 39 (LL) 80 - 100 mmHg    O2 SAT 76 (L) 92 - 97 %    BICARBONATE 21 (L) 22 - 26 mmol/L    BASE DEFICIT 1.5 mmol/L    O2 METHOD NRM      O2 FLOW RATE 15.00 L/min    FIO2 100 %    MODE OTHER      SPONTANEOUS RATE 26.0      Sample source ARTERIAL      SITE RIGHT RADIAL      LEOLA'S TEST YES      Critical value read back Lenny Ziegler RN    CBC WITH AUTOMATED DIFF   Result Value Ref Range    WBC 14.7 (H) 4.1 - 11.1 K/uL    RBC 4.94 4.10 - 5.70 M/uL    HGB 13.2 12.1 - 17.0 g/dL    HCT 41.2 36.6 - 50.3 %    MCV 83.4 80.0 - 99.0 FL    MCH 26.7 26.0 - 34.0 PG    MCHC 32.0 30.0 - 36.5 g/dL    RDW 15.1 (H) 11.5 - 14.5 %    PLATELET 688 (L) 206 - 400 K/uL    NEUTROPHILS 52 %    BAND NEUTROPHILS 19 %    LYMPHOCYTES 17 %    MONOCYTES 3 %    EOSINOPHILS 0 %    BASOPHILS 0 %    METAMYELOCYTES 9 %    ABS. NEUTROPHILS 10.4 K/UL    ABS. LYMPHOCYTES 2.5 K/UL    ABS. MONOCYTES 0.4 K/UL    ABS. EOSINOPHILS 0.0 K/UL    ABS.  BASOPHILS 0.0 K/UL    RBC COMMENTS NORMOCYTIC, NORMOCHROMIC      WBC COMMENTS TOXIC GRANULATION      DF MANUAL     METABOLIC PANEL, BASIC   Result Value Ref Range    Sodium 144 136 - 145 mmol/L    Potassium 3.8 3.5 - 5.1 mmol/L    Chloride 110 (H) 97 - 108 mmol/L    CO2 23 21 - 32 mmol/L    Anion gap 11 5 - 15 mmol/L    Glucose 90 65 - 100 mg/dL    BUN 33 (H) 6 - 20 MG/DL    Creatinine 2.34 (H) 0.70 - 1.30 MG/DL    BUN/Creatinine ratio 14 12 - 20      GFR est AA 32 (L) >60 ml/min/1.73m2    GFR est non-AA 26 (L) >60 ml/min/1.73m2    Calcium 8.4 (L) 8.5 - 10.1 MG/DL   PHOSPHORUS   Result Value Ref Range    Phosphorus 3.4 2.6 - 4.7 MG/DL   NUCLEATED RBC   Result Value Ref Range    NRBC 0.0 0  WBC    ABSOLUTE NRBC 0.00 0.00 - 2.42 K/uL   METABOLIC PANEL, BASIC   Result Value Ref Range    Sodium 143 136 - 145 mmol/L    Potassium 3.5 3.5 - 5.1 mmol/L    Chloride 112 (H) 97 - 108 mmol/L    CO2 19 (L) 21 - 32 mmol/L    Anion gap 12 5 - 15 mmol/L    Glucose 83 65 - 100 mg/dL    BUN 43 (H) 6 - 20 MG/DL    Creatinine 2.29 (H) 0.70 - 1.30 MG/DL    BUN/Creatinine ratio 19 12 - 20      GFR est AA 33 (L) >60 ml/min/1.73m2    GFR est non-AA 27 (L) >60 ml/min/1.73m2    Calcium 8.5 8.5 - 10.1 MG/DL GLUCOSE, POC   Result Value Ref Range    Glucose (POC) 103 (H) 65 - 100 mg/dL    Performed by Ousmane Butts    GLUCOSE, POC   Result Value Ref Range    Glucose (POC) 154 (H) 65 - 100 mg/dL    Performed by Johnson Norton    GLUCOSE, POC   Result Value Ref Range    Glucose (POC) 160 (H) 65 - 100 mg/dL    Performed by [employer]    GLUCOSE, POC   Result Value Ref Range    Glucose (POC) 167 (H) 65 - 100 mg/dL    Performed by [employer]    GLUCOSE, POC   Result Value Ref Range    Glucose (POC) 172 (H) 65 - 100 mg/dL    Performed by Susan Person    GLUCOSE, POC   Result Value Ref Range    Glucose (POC) 160 (H) 65 - 100 mg/dL    Performed by Susan Person    GLUCOSE, POC   Result Value Ref Range    Glucose (POC) 132 (H) 65 - 100 mg/dL    Performed by Johnson Norton    GLUCOSE, POC   Result Value Ref Range    Glucose (POC) 99 65 - 100 mg/dL    Performed by [employer]    GLUCOSE, POC   Result Value Ref Range    Glucose (POC) 86 65 - 100 mg/dL    Performed by Susan Person    GLUCOSE, POC   Result Value Ref Range    Glucose (POC) 112 (H) 65 - 100 mg/dL    Performed by Susan Person    GLUCOSE, POC   Result Value Ref Range    Glucose (POC) 76 65 - 100 mg/dL    Performed by Pamela Stevenson    GLUCOSE, POC   Result Value Ref Range    Glucose (POC) 78 65 - 100 mg/dL    Performed by Pamela Stevenson    GLUCOSE, POC   Result Value Ref Range    Glucose (POC) 120 (H) 65 - 100 mg/dL    Performed by [employer]    GLUCOSE, POC   Result Value Ref Range    Glucose (POC) 87 65 - 100 mg/dL    Performed by Filemon Lau    GLUCOSE, POC   Result Value Ref Range    Glucose (POC) 97 65 - 100 mg/dL    Performed by Rosangela Osborne    EKG, 12 LEAD, INITIAL   Result Value Ref Range    Ventricular Rate 70 BPM    Atrial Rate 58 BPM    P-R Interval 192 ms    QRS Duration 116 ms    Q-T Interval 442 ms    QTC Calculation (Bezet) 477 ms    Calculated R Axis 41 degrees    Calculated T Axis -99 degrees    Diagnosis       Electronic ventricular pacemaker  Confirmed by Shay Rivas ([id number]) on 7/26/2017 9:32:48 AM          Imaging review:  See below. Stroke workup    24 HR CT head  Evolution of large left anterior and middle cerebral territory  infarcts. No hemorrhagic transformation. Mild mass effect on the ventricular  system without midline shift. No new areas of infarction. CT angiogram head and neck  1. Left middle cerebral artery branch occlusion. 2. Distal left anterior cerebral artery occlusion. 3. Acute infarction left superior posterior frontal lobe and suggested early  left lateral frontal lobe and insula infarction. TTE:   Left ventricle: Ejection fraction was estimated in the range of 50 % to 55  %. No obvious wall motion abnormalities identified in the views obtained. Pending  Stroke labs:  HgBA1c    Lab Results   Component Value Date/Time    Hemoglobin A1c 5.6 11/03/2016 10:32 AM     LDL   Lab Results   Component Value Date/Time    LDL, calculated 134 11/03/2016 10:32 AM       HOME MEDS  Prior to Admission Medications   Prescriptions Last Dose Informant Patient Reported? Taking? Blood-Glucose Meter (FREESTYLE INSULINX) misc   No No   Sig: Test blood sugar once a week   Lancets (FREESTYLE LANCETS) misc   No No   Sig: Check blood sugar daily and record   atorvastatin (LIPITOR) 20 mg tablet   No No   Sig: Take 1 Tab by mouth daily. cholecalciferol, vitamin D3, (VITAMIN D3) 4,000 unit cap   No No   Sig: Take 4,000 Units by mouth daily. clotrimazole-betamethasone (LOTRISONE) topical cream   Yes No   Sig: APPLY AA BID   furosemide (LASIX) 40 mg tablet   No No   Sig: Take 1 Tab by mouth daily. glucose blood VI test strips (FREESTYLE INSULINX) strip   No No   Sig: Test blood sugar daily and record   metoprolol (LOPRESSOR) 25 mg tablet   No No   Sig: Take 1 Tab by mouth two (2) times a day. valsartan (DIOVAN) 80 mg tablet   No No   Sig: Take 1 Tab by mouth daily.       Facility-Administered Medications: None CURRENT MEDS  Current Facility-Administered Medications   Medication Dose Route Frequency    furosemide (LASIX) injection 80 mg  80 mg IntraVENous Q12H    dextrose 5 % - 0.45% NaCl infusion  25 mL/hr IntraVENous CONTINUOUS    amiodarone (CORDARONE) 300 mg in dextrose 5% 250 mL infusion  1 mg/min IntraVENous CONTINUOUS    Followed by   [facility] amiodarone (CORDARONE) 300 mg in dextrose 5% 250 mL infusion  0.5 mg/min IntraVENous CONTINUOUS    piperacillin-tazobactam (ZOSYN) 4.5 g in 0.9% sodium chloride (MBP/ADV) 100 mL  4.5 g IntraVENous Q12H    sodium chloride (NS) flush 5-10 mL  5-10 mL IntraVENous Q8H    mupirocin (BACTROBAN) 2 % ointment   Both Nostrils BID    albuterol-ipratropium (DUO-NEB) 2.5 MG-0.5 MG/3 ML  3 mL Nebulization QID RT    insulin lispro (HUMALOG) injection   SubCUTAneous Q6H       IMPRESSION:  Philip Begum is a 80 y.o. male who presents with altered mental status and right-sided weakness. 1. Left MCA and DENA infarct  2. A fib  3. HTN  4. HLD    RECOMMENDATIONS:  - CT scan head and CT angiogram head and neck - L MCA and DENA occlusion and left frontal infarct  - TTE - Normal in May 2017  - Telemetry  - BP goal is less than 140/100  - Stroke labs (HgbA1c, TSH, lipid panel) - LDL is elevated  - Start  mg WI for 1 wk and after that he can be anticoagulated and ASA be stopped. - Start atorvastatin 40 mg daily when he can take PO meds  - ST/OT/PT eval    Overall prognosis is poor. 35 minutes critical care time provided.         Jacklynn Sicard, MD  Neurologist

## 2017-07-27 NOTE — CONSULTS
Palliative Medicine Consult  [place]: [phone] ([phone])    Patient Name: Philip Trejo. YOB: 1925    Date of Initial Consult: 07/26/2017  Reason for Consult: care decisions  Requesting Provider: Dr. Frank Norton   Primary Care Physician: Oscar Officer, MD      SUMMARY:   Philip Begum is a 80 y.o. gentleman w/ PMH most significant for A fib, systolic CHF, PM placement, HTN, HLD, DM2, h/o subdural hematoma who was admitted for ischemic stroke. Current medical issues leading to Palliative Medicine involvement include: care decisions in setting of  Extensive L MCA and L DENA ischemic stroke  SOB  Weakness  Debility     PALLIATIVE DIAGNOSES:   1. SOB  2. Weakness  3. Debility  4. Counseling regarding goals of care and advance care planning  5. Ischemic stroke     PLAN:   Brief Summary of Plan  -visited w/ pt in his rm. Wife and son are at bedside  Branden Mccall () and I visited from our team  -revisited discussion regarding goals of care  -we will continue to establish therapeutic alliance as well as provide psychosocial support    1. Goals of Care- confirmed change to focus on comfort measures    2. Advance Care Planning- pt's code status is DNAR/AND. Active EMR order reflects the above. \"DDNR\" form on file. Reviewed. AMD on file. Reviewed. Pink sheet updated to reflect the above. 3. Information Sharing-   07/27/2017  Son says that he sees pt does not want this. Pt is struggling and suffering. Wife agrees. Son wishes to change direction of medical treatment to focus on comfort. I asked if they wished for me to share what that would look like. They provided permission. We discussed at length. Confirmed wishes of family. Emphasized to family that we would do our best to maintain comfort and dignity. 07/26/2017  son remembers Dr. Gilmore Dense speaking w/ them back in April.    He asked what my specialty is and says he does not recall being told our role in their care. After I provided info, they verbalized understanding of all the points made including the following:  -we are an interdisciplinary team serving as a bridge of communication and advocate on behalf of the patient and family when needed  -we are a support care service that, when needed, assists w/ sx mgmt  -though hospice is \"under\" palliative medicine, our service is not hospice nor does a consult visit by our service mean that hospice is being or should be considered  -we are not here to make medical decisions, and our being consulted does not equate to a change in a patient's overall condition  -our involvement is independent of clinical trajectory and/or medical decisions made    Family demonstrates good understanding of his condition and what we are doing medically. Son voices that if there is [place] radical treatment\" that can be done, he knows that his father would want to go through w/ it. Wife notes that pt was very independent including driving around prior to coming into the hospital.     We did not specifically discuss range of medical treatment nor code status. 4. Psychosocial Support- We will continue to support patient and family during this difficult hospitalization    5. Spiritual- at this time, there are no apparent spiritual needs or concerns. 6. Symptom Management- non-comfort orders were d/c'ed by ICU attending. Reviewed comfort orders. Will add or change as required. I have discussed with patients bedside RN staff, Jaciel Meade and Giovanna Jimenez. Time and input appreciated. I have discussed with Dr. Mary Solis, ICU attending. Time and input appreciated. I have discussed with our palliative care IDT. Thank you for this consult that has provided us with the opportunity to be involved in this patient's care. We will continue to follow along with you.   Should you have any questions or concerns prior to our next visit at the bedside, please do not hesitate to contact us   [phone]). Matheus Arora MD  Palliative Care Team     GOALS OF CARE / TREATMENT PREFERENCES:   [====Goals of Care====]  GOALS OF CARE:  Patient / health care proxy stated goals:   Comfort measures only    TREATMENT PREFERENCES:   Code Status: DNR   Pt's code status is DO NOT ATTEMPT RESUSCITATION (DNAR) / ALLOW NATURAL DEATH (AND). Advance Care Planning:  Advance Care Planning 7/25/2017   Patient's Healthcare Decision Maker is: Named in scanned ACP document   Primary Decision Maker Name Reagan Montemayor III   Primary Decision Maker Phone Number [phone]   Primary Decision Maker Relationship to Patient Adult child   Confirm Advance Directive Yes, on file   Does the patient have other document types Do Not Resuscitate     Other:    The palliative care team has discussed with patient / health care proxy about goals of care / treatment preferences for patient.  [====Goals of Care====]     HISTORY:     History obtained from: family, chart    CHIEF COMPLAINT: pt unable to communicate cc    HPI/SUBJECTIVE:    The patient is:   [] Verbal and participatory  [x] Non-participatory due to:  Current condition    No o/n events or issues. Per bedside RN staff, concern regarding breathing and chest auscultation. It was expressed by son that he wishes for change in goals of care to comfort measures.      Clinical Pain Assessment (nonverbal scale for severity on nonverbal patients):   [++++ Clinical Pain Assessment++++]  [++++Pain Severity++++]: Pain: 0  [++++Pain Character++++]:   [++++Pain Duration++++]:   [++++Pain Effect++++]:   [++++Pain Factors++++]:   [++++Pain Frequency++++]:   [++++Pain Location++++]:   [++++ Clinical Pain Assessment++++]     FUNCTIONAL ASSESSMENT:     Palliative Performance Scale (PPS):  PPS: 20       PSYCHOSOCIAL/SPIRITUAL SCREENING:     Advance Care Planning:  Advance Care Planning 7/25/2017   Patient's Healthcare Decision Maker is: Named in scanned ACP document   Primary Decision Maker Name Juanita Garcia   Primary Decision Maker Phone Number [phone]   Primary Decision Maker Relationship to Patient Adult child   Confirm Advance Directive Yes, on file   Does the patient have other document types Do Not Resuscitate        Any spiritual / Quaker concerns:  [] Yes /  [x] No    Caregiver Burnout:  [] Yes /  [x] No /  [] No Caregiver Present      Anticipatory grief assessment:   [x] Normal  / [] Maladaptive        ESAS Anxiety:      ESAS Depression:          REVIEW OF SYSTEMS:     Positive and pertinent negative findings in ROS are noted above in HPI. The following systems were [] reviewed / [x] unable to be reviewed as noted in HPI  Other findings are noted below. Systems: constitutional, ears/nose/mouth/throat, respiratory, gastrointestinal, genitourinary, musculoskeletal, integumentary, neurologic, psychiatric, endocrine. Positive findings noted below. Modified ESAS Completed by: provider           Pain: 0           Dyspnea: 0                    PHYSICAL EXAM:     From RN flowsheet:  Wt Readings from Last 3 Encounters:   07/27/17 144 lb 6.4 oz (65.5 kg)   07/13/17 136 lb (61.7 kg)   07/06/17 136 lb (61.7 kg)     Blood pressure 121/67, pulse (!) 123, temperature 98 °F (36.7 °C), resp. rate (!) 35, height 5' 9\" (1.753 m), weight 144 lb 6.4 oz (65.5 kg), SpO2 (!) 89 %.     Pain Scale 1: Behavioral Pain Scale (BPS)  Pain Intensity 1: 3              Pain Intervention(s) 1: Medication (see MAR), MD notified (comment) (Gave Halodol)    Gen: in NAD  HEENT: NC/AT, MMM, Venti mask securely in place  Respiratory: tachypneic, breathing mildly labored, symmetric  Skin: warm, dry  Neurologic: not awake  Psychiatric: limited exam d/t condition  No obvious signs of agitation     HISTORY:     Active Problems:    PAF (paroxysmal atrial fibrillation) ([facility] (8/6/2013)      CVA (cerebral vascular accident) ([facility] (7/24/2017)      Aspiration into airway (7/25/2017)      Weakness (7/26/2017)      Debility (7/26/2017)      Counseling regarding advanced care planning and goals of care (7/26/2017)      Past Medical History:   Diagnosis Date    Arrhythmia     Arthritis     Carpal tunnel syndrome 2006    CKD (chronic kidney disease) 3/2/2017    DM type 2 (diabetes mellitus, type 2) ([facility] 10/28/2009    ED (erectile dysfunction) 10/28/2009    HTN (hypertension) 10/28/2009    Hyperlipidemia 10/28/2009    Neuropathy ([facility] 10/28/2009    Other ill-defined conditions     hyperlipidemia    Pacemaker     SDH (subdural hematoma) (HCC)     Systolic CHF ([facility] 95/31/1540    Vertigo 2007      Past Surgical History:   Procedure Laterality Date    HX PACEMAKER      NEUROLOGICAL PROCEDURE UNLISTED  1999    evacuation SDH; At [facility]. Family History   Problem Relation Age of Onset    Stroke Mother     Pneumonia Father     MS Child     Cancer Child      breast    Hearing Impairment Child       History reviewed, no pertinent family history.   Social History   Substance Use Topics    Smoking status: Never Smoker    Smokeless tobacco: Never Used    Alcohol use No     Allergies   Allergen Reactions    Lisinopril Swelling     angioedema      Current Facility-Administered Medications   Medication Dose Route Frequency    dextrose 5 % - 0.45% NaCl infusion  25 mL/hr IntraVENous CONTINUOUS    morphine injection 2-4 mg  2-4 mg IntraVENous Q1H PRN    haloperidol lactate (HALDOL) injection 4 mg  4 mg IntraVENous Q4H PRN    sodium chloride (NS) flush 5-10 mL  5-10 mL IntraVENous Q8H    sodium chloride (NS) flush 5-10 mL  5-10 mL IntraVENous PRN    sodium chloride (NS) flush 5-10 mL  5-10 mL IntraVENous PRN    albuterol-ipratropium (DUO-NEB) 2.5 MG-0.5 MG/3 ML  3 mL Nebulization QID RT    sodium chloride (NS) flush 5-10 mL  5-10 mL IntraVENous PRN    sodium chloride (NS) flush 5 mL  5 mL IntraVENous PRN          LAB AND IMAGING FINDINGS:     Lab Results   Component Value Date/Time    WBC 14.7 07/26/2017 04:44 AM HGB 13.2 07/26/2017 04:44 AM    PLATELET 520 66/49/2096 04:44 AM     Lab Results   Component Value Date/Time    Sodium 143 07/27/2017 04:32 AM    Potassium 3.5 07/27/2017 04:32 AM    Chloride 112 07/27/2017 04:32 AM    CO2 19 07/27/2017 04:32 AM    BUN 43 07/27/2017 04:32 AM    Creatinine 2.29 07/27/2017 04:32 AM    Calcium 8.5 07/27/2017 04:32 AM    Magnesium 2.2 06/29/2017 12:10 PM    Phosphorus 3.4 07/26/2017 04:44 AM      Lab Results   Component Value Date/Time    AST (SGOT) 41 07/24/2017 08:11 PM    Alk. phosphatase 116 07/24/2017 08:11 PM    Protein, total 7.0 07/24/2017 08:11 PM    Albumin 3.3 07/24/2017 08:11 PM    Globulin 3.7 07/24/2017 08:11 PM     Lab Results   Component Value Date/Time    INR 1.1 07/24/2017 08:11 PM    Prothrombin time 10.9 07/24/2017 08:11 PM    aPTT 27.3 07/24/2017 08:11 PM      No results found for: IRON, FE, TIBC, IBCT, PSAT, FERR   Lab Results   Component Value Date/Time    pH 7.45 07/25/2017 02:15 AM    PCO2 31 07/25/2017 02:15 AM    PO2 39 07/25/2017 02:15 AM     No components found for: [place]   Lab Results   Component Value Date/Time     04/09/2017 04:49 AM    CK - MB 2.5 04/09/2017 04:49 AM                Total time: 40 min  Counseling / coordination time, spent as noted above: 25 min  > 50% counseling / coordination?: yes    Prolonged service was provided for  []30 min   []75 min in face to face time in the presence of the patient, spent as noted above. Time Start:   Time End:   Note: this can only be billed with 50209 (initial) or 89700 (follow up). If multiple start / stop times, list each separately.

## 2017-07-27 NOTE — PROGRESS NOTES
Overheard from staff that pt's family was opting for comfort care. As Palliative MD, Dr. Deangelo Ontiveros, was confirming information, this  provided support to wife who was at bedside.  stepped out to check in with on-call  and confer with the Palliative team members involved. An impromptu family meeting was called with Dr. Deangelo Ontiveros, son (MPOA), and wife in which there seemed to be agreement between both son and wife that pt would not want to live this way. Provided calming presence and emotional support as Dr. Deangelo Ontiveros answered their questions and discussed what shifting to comfort care would look like. Family was also open to (at some point) speaking with hospice as necessary. All of this was confirmed by Dr. Deangelo Ontiveros.  provided emotional support and calming presence as Dr. Deangelo Ontiveros stepped out to inform rest of care team.  After Dr. Luiza Barahona return,  escorted family back to room and will follow up with them as possible. Marco Antonio Whitaker M.Div.   Palliative  Fellow

## 2017-07-27 NOTE — PROGRESS NOTES
Report received from [facility], lab results reviewed. 0825  Assessment completed, moving left arm well, sometimes pulling off oxygen. Arouses and opens eyes, but did not attempt to speak. 0900  Dr. Luba Ureña in, discussed with wife need for nutrition and his prior desires for care. Discussed Hospice briefly as well. 1010  IV fluids changed to 1/2 NS initially, now changed after rounds to D51/2 NS. Dr. Nicholas Cleverly aware of variable heart rate and rhythm with bursts and periods of PVC's. Urine output remains low despite lasix. 1135  Sats remain poor despite turning, will NT suction. Amiodarone bolus now infusing. Assessment unchanged. 1220 Amiodarone bolus again given, rate increased to 1 mg on Amiodarone gtt. 1430  Discussed low sats again and lack of change in rhythm with Dr. Elvia Cain, to continue treatment as ordered. NT suctioned with little change in saturations. Report given to BREANA Rose Rn for continued care of patient.

## 2017-07-27 NOTE — PROGRESS NOTES
[id number], patient with Paroxymal A Fib with pacer on demand, , BP 97/55 mmHg, on Amiodarone at 0.5 mg/min, RR 36, Sat 81-93% on NRM at 15 L/min, chest coarse  Crackles rhonchi wet congested diminished all over the  Lungs, NT suction done, moderate thick brown tinged secretion, RT informed to do Neb PRN, CXR ordered, order for lasix 40 mg IV once taken from hospitalist on call, patient still lethargic and follow commands, flaccid right arm, withdraw at right leg, Aphasic, pupils reactive, Glucose at am 87, Bicarb 19, low urine out put, now sat improve to 88-94% on NRM at 15 L/min, lungs still wet coarse but better  Than 3 hours ago, to follow up. Bedside and Verbal shift change report given to Trupti Cronin (oncoming nurse) by Audrey Mcdonald RN (offgoing nurse). Report included the following information SBAR, Kardex, ED Summary, Procedure Summary, Intake/Output, MAR, Accordion, Recent Results, Med Rec Status and Cardiac Rhythm A Fib, Pacer on Demand.

## 2017-07-27 NOTE — PROGRESS NOTES
7/27/2017    INTENSIVIST PROGRESS NOTE:     Patient seen and evaluated. Admitted overnight with acute stroke, s/p TPA. Still with dense right hemiparesis. Had some respiratory distress but he vomited while on BiPAP. He remains obtunded today, with continued worsening oxygenation.     Visit Vitals    /85    Pulse (!) 115    Temp 99 °F (37.2 °C)    Resp (!) 33    Ht 5' 9\" (1.753 m)    Wt 61.5 kg (135 lb 9.3 oz)    SpO2 (!) 89%    BMI 20.02 kg/m2     GEN: ill appearing, mild respiratory distress  CV: RRR  Lungs: ronchi and rales  Neuro: dense right hemiparesis    Labs reviewed  Imaging reviewed    A/P:  - Acute right MCA stroke, s/p TPA - still with dense deficits and marked ischemia - neuro following  - aspiration pneumonia - O2 - bronchotilators, IV antibiotics   - pulmonary edema (vs markedly worsening pneumonia) - diurese  - metabolic acidosis due to acute illness  - DM - insulin  - DVT prophylaxis  - DNR/DNI - prognosis is guarded at best  Watson Johns MD

## 2017-07-27 NOTE — DIABETES MGMT
DTC Progress Note    Recommendations/ Comments: Pt discussed with rounding team and Dr. Familia Bustos. Plan to add dextrose to IVF secondary to BG 70s-90s, NPO, neuro status. Chart reviewed on Keena Wells during Multidisciplinary Rounds. A1c:   Lab Results   Component Value Date/Time    Hemoglobin A1c 5.6 11/03/2016 10:32 AM           Recent Glucose Results:   Lab Results   Component Value Date/Time    GLU 83 07/27/2017 04:32 AM    GLUCPOC 87 07/27/2017 06:15 AM    GLUCPOC 120 (H) 07/27/2017 12:36 AM    GLUCPOC 78 07/27/2017 12:00 AM        Lab Results   Component Value Date/Time    Creatinine 2.29 07/27/2017 04:32 AM     Estimated Creatinine Clearance: 18.3 mL/min (based on Cr of 2.29). Active Orders   Diet    DIET NPO        PO intake: No data found. Will continue to follow as needed. Thank you.   Maria De Jesus Sesay, JERIN, RN, [address] [place] [address]

## 2017-07-27 NOTE — PROGRESS NOTES
1900 Bedside report received from Mirian Trevizo, RN via [employer]. Patient is unresponsive to commands. Moves LUE spontaneously, withdraws from pain. NS at 75 cc/hr infusing and Amio at 0.5 mg/hr infusing to PIVx2. Sats 89-93% with ventimask 50%. HR is between 110-150s afib with RVR with some PVCs noted. Wife at bedside. Q2H neuro checks in effect. 2000 Shift assessment and neuro checks complete. 2100 Orotracheal suctioned performed due to audible secretion noted, effective. 2200 Sats dropped to 85%. NRM applied. Neurochecks performed, no changes. Will continue to monitor. 0000 Reassessment complete, no changes. FSBG are 76 and 78, D10 125 mL infusing. Will recheck in 15 minutes. Sats 91-93% with NRM.    0036 .    0200 Neurochecks complete, no changes noted. 0400 Reassessment and neurochecks complete. Sats continue to dropped to 81% with NRM, audible congestion and low urine output noted. Paged Dr. Lalo Arias and received an order for Lasix 40mg IV now.

## 2017-07-27 NOTE — PROGRESS NOTES
S: Mr. Araceli Dover was seen by me today during rounds. At this time, he is lying in bed. He is awake, but not verbalizing. Not moving R side. ROS not obtainable at this time. O: Blood pressure 193/85, pulse (!) 115, temperature 99 °F (37.2 °C), resp. rate (!) 33, height 5' 9\" (1.753 m), weight 135 lb 9.3 oz (61.5 kg), SpO2 (!) 88 %. Lungs: B crackles. Heart: Irregular. Abd: S, NT, ND, BS present. Extremities: Warm. Neuro: R hemiparesis. Recent Results (from the past 12 hour(s))   GLUCOSE, POC    Collection Time: 07/26/17 11:58 PM   Result Value Ref Range    Glucose (POC) 76 65 - 100 mg/dL    Performed by Ferny Link 90, POC    Collection Time: 07/27/17 12:00 AM   Result Value Ref Range    Glucose (POC) 78 65 - 100 mg/dL    Performed by Ferny Phill Nikolay 90, POC    Collection Time: 07/27/17 12:36 AM   Result Value Ref Range    Glucose (POC) 120 (H) 65 - 100 mg/dL    Performed by Sandra Simental    METABOLIC PANEL, BASIC    Collection Time: 07/27/17  4:32 AM   Result Value Ref Range    Sodium 143 136 - 145 mmol/L    Potassium 3.5 3.5 - 5.1 mmol/L    Chloride 112 (H) 97 - 108 mmol/L    CO2 19 (L) 21 - 32 mmol/L    Anion gap 12 5 - 15 mmol/L    Glucose 83 65 - 100 mg/dL    BUN 43 (H) 6 - 20 MG/DL    Creatinine 2.29 (H) 0.70 - 1.30 MG/DL    BUN/Creatinine ratio 19 12 - 20      GFR est AA 33 (L) >60 ml/min/1.73m2    GFR est non-AA 27 (L) >60 ml/min/1.73m2    Calcium 8.5 8.5 - 10.1 MG/DL   GLUCOSE, POC    Collection Time: 07/27/17  6:15 AM   Result Value Ref Range    Glucose (POC) 87 65 - 100 mg/dL    Performed by Filemon Lau         A / P: Active Problems:  1. CVA (cerebral vascular accident) ([address] (7/24/2017): He is in ICU, s/p tPA. Neurology following. 2. Respiratory failure / CHF: Diurese as able. 3. PAF (paroxysmal atrial fibrillation) ([facility] (8/6/2013): Rate has been high today. Cardiology following. 4. Aspiration into airway (7/25/2017) with hypoxic resp failure:  On oxygen. 5. DM type 2 (diabetes mellitus, type 2) ([facility] (10/28/2009): SSI. Has needed D50 on admission due to hypoglycemia. 6. CKD (chronic kidney disease) (3/2/2017)  7. HTN (hypertension) (10/28/2009): BP running low at times. Hold BP meds. Pressors if needed, unless declined by family. 8. DNR / DNI. Palliative care to help clarify goals of care. 9. Prognosis poor.

## 2017-07-27 NOTE — PROGRESS NOTES
1430: Received bedside and verbal shift report from Gurvinder Matias RN; reported that Dr. Toby Brody is aware of low SaO2 <85 and rapid a-fib and to continue treatment as ordered, no changes were made. 1449: Pt attempting to remove non-re-breather despite education on how it is helping him to breath, increased heart rate noted; gave 4mg of haloperidol to help patient relax. 1500: Assessment complete, see summary for details; pt opens eyes to voice, and grimaces to painful stimuli, no command following noted, purposeful movement noted on the upper right extremity, no movement on left side of body, lungs very course,  Currently in rapid a-fib, afebrile, no evidence of pain noted. 1600: Assessment complete, see summary for details; no changes noted. 1630: Son at bedside updated on patients condition; family has decided to move patient to comfort care; Dr. Lali Guevara (palliative) paged and Dr. Toby Brody paged; orders noted. 1840: RR >30, gave 2mg of PRN Morphine. 1900: Gave bedside and verbal shift report to RAUDEL Escalera.

## 2017-07-27 NOTE — PROGRESS NOTES
Attended Interdisciplinary rounds in Critical Care Unit, where patient care was discussed. Visit by: Sharri Rosario. Ravindra Casanova.  Lalito Wise MA, [address]

## 2017-07-27 NOTE — PROGRESS NOTES
S: Mr. Ambar Loja. was seen by me today during rounds. At this time, he is lying in bed. He is awake, but not verbalizing. Not moving R side. ROS not obtainable at this time. O: Blood pressure 193/85, pulse (!) 115, temperature 99 °F (37.2 °C), resp. rate (!) 33, height 5' 9\" (1.753 m), weight 135 lb 9.3 oz (61.5 kg), SpO2 (!) 88 %. Lungs: CTAB. Heart: Rapid, irregular. Abd: S, NT, ND, BS present. Extremities: Warm. Neuro: R hemiparesis. Recent Results (from the past 12 hour(s))   GLUCOSE, POC    Collection Time: 07/26/17 11:58 PM   Result Value Ref Range    Glucose (POC) 76 65 - 100 mg/dL    Performed by Ferny Link 90, POC    Collection Time: 07/27/17 12:00 AM   Result Value Ref Range    Glucose (POC) 78 65 - 100 mg/dL    Performed by Ferny Link 90, POC    Collection Time: 07/27/17 12:36 AM   Result Value Ref Range    Glucose (POC) 120 (H) 65 - 100 mg/dL    Performed by Marleny Aguero    METABOLIC PANEL, BASIC    Collection Time: 07/27/17  4:32 AM   Result Value Ref Range    Sodium 143 136 - 145 mmol/L    Potassium 3.5 3.5 - 5.1 mmol/L    Chloride 112 (H) 97 - 108 mmol/L    CO2 19 (L) 21 - 32 mmol/L    Anion gap 12 5 - 15 mmol/L    Glucose 83 65 - 100 mg/dL    BUN 43 (H) 6 - 20 MG/DL    Creatinine 2.29 (H) 0.70 - 1.30 MG/DL    BUN/Creatinine ratio 19 12 - 20      GFR est AA 33 (L) >60 ml/min/1.73m2    GFR est non-AA 27 (L) >60 ml/min/1.73m2    Calcium 8.5 8.5 - 10.1 MG/DL   GLUCOSE, POC    Collection Time: 07/27/17  6:15 AM   Result Value Ref Range    Glucose (POC) 87 65 - 100 mg/dL    Performed by Filemon Lau         A / P: Active Problems:  1. CVA (cerebral vascular accident) ([address] (7/24/2017): He is in ICU, s/p tPA. Neurology following. 2. PA[facility]oxysmal [facility] fibrillation) [facility] (8/6/2013): Rate has been high today. Cardiology following. 3. Aspiration into airway (7/25/2017) with hypoxic resp failure: On oxygen.    4. DM type 2 (diabetes mellitus, type 2) ([facility] (10/28/2009): SSI. Has needed D50 on admission due to hypoglycemia. 5. CKD (chronic kidney disease) (3/2/2017)  6. HTN (hypertension) (10/28/2009): BP running low at times. Hold BP meds. Pressors if needed, unless declined by family. 7. DNR / DNI. Palliative care to meet with family.

## 2017-07-28 NOTE — PROGRESS NOTES
Pt. Wayne Lis in to the ER via EMS due to becoming unresponsive at home. He had a rt. Facial droop and RUE was flaccid. Only responded to noxious stimuli. Pt. Was sent to CCU on admission. He was seen by palliative med. He was transferred to Oncology and has a hospice consult today. Pt. Is currently in respiratory distress. No family is present. From reviewing chart in April, pt. Was independent and driving. He and wife were in their own home. CM will follow and assist, as needed. -- Rita ÁLVAREZ,JERI,FLK--[phone]    Sent referral to  Hospice.--[facility]. [facility]    Care Management Interventions  PCP Verified by CM: Yes  Palliative Care Consult (Criteria: CHF and RRAT>21): Yes  Mode of Transport at Discharge: Other (see comment)  Transition of Care Consult (CM Consult):  Other  MyChart Signup: No  Discharge Durable Medical Equipment: No  Health Maintenance Reviewed: Yes  Physical Therapy Consult: Yes  Occupational Therapy Consult: Yes  Speech Therapy Consult: Yes  Current Support Network: Lives with Spouse, Own Home

## 2017-07-28 NOTE — PROGRESS NOTES
Noted in chart that patient is now comfort measures. Please call if any cardiology assistance is needed.             Anshul Jimenez NP  DNP, RN, AGACNP-BC

## 2017-07-28 NOTE — CONSULTS
Palliative Medicine Consult  Hung: [phone] ([phone])    Patient Name: Randa Yuan. YOB: 1925    Date of Initial Consult: 07/26/2017  Reason for Consult: care decisions  Requesting Provider: Dr. Karine Baker   Primary Care Physician: Samuel Coker MD      SUMMARY:   Randa Millan is a 80 y.o. gentleman w/ PMH most significant for A fib, systolic CHF, PM placement, HTN, HLD, DM2, h/o subdural hematoma who was admitted for ischemic stroke. Current medical issues leading to Palliative Medicine involvement include: care decisions in setting of  Extensive L MCA and L DENA ischemic stroke  SOB  Weakness  Debility     PALLIATIVE DIAGNOSES:   1. SOB  2. Weakness  3. Debility  4. Counseling regarding goals of care and advance care planning  5. Ischemic stroke     PLAN:   Brief Summary of Plan  -visited w/ pt in his rm. No family at bedside  -pt appears comfortable. Continue w/ current comfort measures  -pt is being transferred to onc flr; confirmed that family is aware  -placed consult to CM for hospice referral   -we will continue to establish therapeutic alliance as well as provide psychosocial support    1. Goals of Care- confirmed change to focus on comfort measures on 07/27/2017    2. Advance Care Planning- pt's code status is DNAR/AND. Active EMR order reflects the above. \"DDNR\" form on file. Reviewed. AMD on file. Reviewed. Pink sheet updated on 07/27/2017  to reflect the above. 3. Information Sharing-   07/28/2017  No opportunity for significant info sharing at this time    07/27/2017  Son says that he sees pt does not want this. Pt is struggling and suffering. Wife agrees. Son wishes to change direction of medical treatment to focus on comfort. I asked if they wished for me to share what that would look like. They provided permission. We discussed at length. Confirmed wishes of family.   Emphasized to family that we would do our best to maintain comfort and dignity. 07/26/2017  son remembers Dr. Yenifer Traore speaking w/ them back in April. He asked what my specialty is and says he does not recall being told our role in their care. After I provided info, they verbalized understanding of all the points made including the following:  -we are an interdisciplinary team serving as a bridge of communication and advocate on behalf of the patient and family when needed  -we are a support care service that, when needed, assists w/ sx mgmt  -though hospice is \"under\" palliative medicine, our service is not hospice nor does a consult visit by our service mean that hospice is being or should be considered  -we are not here to make medical decisions, and our being consulted does not equate to a change in a patient's overall condition  -our involvement is independent of clinical trajectory and/or medical decisions made    Family demonstrates good understanding of his condition and what we are doing medically. Son voices that if there is [place] radical treatment\" that can be done, he knows that his father would want to go through w/ it. Wife notes that pt was very independent including driving around prior to coming into the hospital.     We did not specifically discuss range of medical treatment nor code status. 4. Psychosocial Support- We will continue to support patient and family during this difficult hospitalization    5. Spiritual- at this time, there are no apparent spiritual needs or concerns. 6. Symptom Management- continue w/ comfort orders. I have discussed with patients bedside RN, Arnulfo Boland. I have discussed with Leander Nichole, hospice liaison staff. I have discussed with Dr. Familia Bustos, ICU attending. Time and input appreciated. I have discussed with our palliative care IDT. Thank you for this consult that has provided us with the opportunity to be involved in this patient's care. We will continue to follow along with you.   Should you have any questions or concerns prior to our next visit at the bedside, please do not hesitate to contact us at [phone]). Jac Mullen MD  Palliative Care Team     GOALS OF CARE / TREATMENT PREFERENCES:   [====Goals of Care====]  GOALS OF CARE:  Patient / health care proxy stated goals:   Comfort measures only    TREATMENT PREFERENCES:   Code Status: DNR   Pt's code status is DO NOT ATTEMPT RESUSCITATION (DNAR) / ALLOW NATURAL DEATH (AND). Advance Care Planning:  Advance Care Planning 7/25/2017   Patient's Healthcare Decision Maker is: Named in scanned ACP document   Primary Decision Maker Name Earnest Painter III   Primary Decision Maker Phone Number [phone]   Primary Decision Maker Relationship to Patient Adult child   Confirm Advance Directive Yes, on file   Does the patient have other document types Do Not Resuscitate     Other:    The palliative care team has discussed with patient / health care proxy about goals of care / treatment preferences for patient.  [====Goals of Care====]     HISTORY:     History obtained from: family, chart    CHIEF COMPLAINT: pt unable to communicate cc    HPI/SUBJECTIVE:    The patient is:   [] Verbal and participatory  [x] Non-participatory due to:  Current condition    No o/n events or issues. Per bedside RN staff, report received that pt did ok through the night.   Has been comfortable through the AM  RN called son to inform him of transfer    Clinical Pain Assessment (nonverbal scale for severity on nonverbal patients):   [++++ Clinical Pain Assessment++++]  [++++Pain Severity++++]: Pain: 0  [++++Pain Character++++]:   [++++Pain Duration++++]:   [++++Pain Effect++++]:   [++++Pain Factors++++]:   [++++Pain Frequency++++]:   [++++Pain Location++++]:   [++++ Clinical Pain Assessment++++]     FUNCTIONAL ASSESSMENT:     Palliative Performance Scale (PPS):  PPS: 20       PSYCHOSOCIAL/SPIRITUAL SCREENING:     Advance Care Planning:  Advance Care Planning 7/25/2017 Patient's Healthcare Decision Maker is: Named in scanned ACP document   Primary Decision Maker Name Evonne Roberts III   Primary Decision Maker Phone Number [phone]   Primary Decision Maker Relationship to Patient Adult child   Confirm Advance Directive Yes, on file   Does the patient have other document types Do Not Resuscitate        Any spiritual / Anabaptism concerns:  [] Yes /  [x] No    Caregiver Burnout:  [] Yes /  [] No /  [x] No Caregiver Present      Anticipatory grief assessment:   [] Normal  / [] Maladaptive        ESAS Anxiety:       ESAS Depression:          REVIEW OF SYSTEMS:     Positive and pertinent negative findings in ROS are noted above in HPI. The following systems were [] reviewed / [x] unable to be reviewed as noted in HPI  Other findings are noted below. Systems: constitutional, ears/nose/mouth/throat, respiratory, gastrointestinal, genitourinary, musculoskeletal, integumentary, neurologic, psychiatric, endocrine. Positive findings noted below. Modified ESAS Completed by: provider           Pain: 0           Dyspnea: 0                    PHYSICAL EXAM:     From RN flowsheet:  Wt Readings from Last 3 Encounters:   07/27/17 144 lb 6.4 oz (65.5 kg)   07/13/17 136 lb (61.7 kg)   07/06/17 136 lb (61.7 kg)     Blood pressure 96/64, pulse (!) 146, temperature 98.1 °F (36.7 °C), resp. rate 25, height 5' 9\" (1.753 m), weight 144 lb 6.4 oz (65.5 kg), SpO2 (!) 83 %. Pain Scale 1: Adult Nonverbal Pain Scale  Pain Intensity 1: 0              Pain Intervention(s) 1: Medication (see MAR), Music    Gen: in NAD  HEENT: NC/AT, MMM, O2 mask securely in place  CV: tacyhcardic, no m/r/g appreciated, no significant BLE edema  Respiratory: breathing unlabored, symmetric  Diffuse rhonchi on limited anterior auscultation  Skin: warm, dry  Neurologic: awake but minimal interaction.   Looks up at me but does not track across rm  Psychiatric: limited exam d/t condition  No obvious signs of agitation HISTORY:     Active Problems:    PAF (paroxysmal atrial fibrillation) ([facility] (8/6/2013)      [facility] (7/24/2017)      Aspiration into airway (7/25/2017)      Weakness (7/26/2017)      Debility (7/26/2017)      Counseling regarding advanced care planning and goals of care (7/26/2017)      Past Medical History:   Diagnosis Date    Arrhythmia     Arthritis     Carpal tunnel syndrome 2006    CKD (chronic kidney disease) 3/2/2017    DM type 2 (diabetes mellitus, type 2) ([facility] 10/28/2009    ED (erectile dysfunction) 10/28/2009    HTN (hypertension) 10/28/2009    Hyperlipidemia 10/28/2009    [facility] 10/28/2009    Other ill-defined conditions     hyperlipidemia    Pacemaker     [facility]     Systolic [facility]F [facility] 34/54/5106    Vertigo 2007      Past Surgical History:   Procedure Laterality Date    HX PACEMAKER      NEUROLOGICAL PROCEDURE UNLISTED  1999    evacuation [facility]; At [facility]. Family History   Problem Relation Age of Onset    Stroke Mother     Pneumonia Father     MS Child     Cancer Child      breast    Hearing Impairment Child       History reviewed, no pertinent family history.   Social History   Substance Use Topics    Smoking status: Never Smoker    Smokeless tobacco: Never Used    Alcohol use No     Allergies   Allergen Reactions    Lisinopril Swelling     angioedema      Current Facility-Administered Medications   Medication Dose Route Frequency    dextrose 5 % - 0.45% NaCl infusion  25 mL/hr IntraVENous CONTINUOUS    morphine injection 2-4 mg  2-4 mg IntraVENous Q1H PRN    glycopyrrolate (ROBINUL) injection 0.2 mg  0.2 mg IntraVENous Q4H    haloperidol lactate (HALDOL) injection 4 mg  4 mg IntraVENous Q4H PRN    sodium chloride (NS) flush 5-10 mL  5-10 mL IntraVENous Q8H    sodium chloride (NS) flush 5-10 mL  5-10 mL IntraVENous PRN    albuterol-ipratropium (DUO-NEB) 2.5 MG-0.5 MG/3 ML  3 mL Nebulization QID RT LAB AND IMAGING FINDINGS:     Lab Results   Component Value Date/Time    WBC 14.7 07/26/2017 04:44 AM    HGB 13.2 07/26/2017 04:44 AM    PLATELET 617 24/60/8782 04:44 AM     Lab Results   Component Value Date/Time    Sodium 143 07/27/2017 04:32 AM    Potassium 3.5 07/27/2017 04:32 AM    Chloride 112 07/27/2017 04:32 AM    CO2 19 07/27/2017 04:32 AM    BUN 43 07/27/2017 04:32 AM    Creatinine 2.29 07/27/2017 04:32 AM    Calcium 8.5 07/27/2017 04:32 AM    Magnesium 2.2 06/29/2017 12:10 PM    Phosphorus 3.4 07/26/2017 04:44 AM      Lab Results   Component Value Date/Time    AST (SGOT) 41 07/24/2017 08:11 PM    Alk. phosphatase 116 07/24/2017 08:11 PM    Protein, total 7.0 07/24/2017 08:11 PM    Albumin 3.3 07/24/2017 08:11 PM    Globulin 3.7 07/24/2017 08:11 PM     Lab Results   Component Value Date/Time    INR 1.1 07/24/2017 08:11 PM    Prothrombin time 10.9 07/24/2017 08:11 PM    aPTT 27.3 07/24/2017 08:11 PM      No results found for: IRON, FE, TIBC, IBCT, PSAT, FERR   Lab Results   Component Value Date/Time    pH 7.45 07/25/2017 02:15 AM    PCO2 31 07/25/2017 02:15 AM    PO2 39 07/25/2017 02:15 AM     No components found for: [place]   Lab Results   Component Value Date/Time     04/09/2017 04:49 AM    CK - MB 2.5 04/09/2017 04:49 AM                Total time: 35 min  Counseling / coordination time, spent as noted above: 20 min  > 50% counseling / coordination?: yes    Prolonged service was provided for  []30 min   []75 min in face to face time in the presence of the patient, spent as noted above. Time Start:   Time End:   Note: this can only be billed with 80139 (initial) or 29677 (follow up). If multiple start / stop times, list each separately.

## 2017-07-28 NOTE — CARDIO/PULMONARY
CP REHAB NOTE     Chart Review: Patient admitted for CVA. Suspect acute left MCA stroke. Medical History: CKD, HTN, pacemaker, CHF  EF 50-55%, 4/10/2017  Non Smoker. Moved to oncology unit- now on Hospice care.

## 2017-07-28 NOTE — PROGRESS NOTES
S: Mr. Magdalena Ruffin. was seen by me today during rounds. At this time, he is lying in bed. He is awake, but not verbalizing. Not moving R side. ROS not obtainable at this time. O: Blood pressure 96/64, pulse (!) 146, temperature 98.1 °F (36.7 °C), resp. rate 25, height 5' 9\" (1.753 m), weight 144 lb 6.4 oz (65.5 kg), SpO2 (!) 83 %. Lungs: B crackles. Heart: Irregular. Abd: S, NT, ND, BS present. Extremities: Warm. Neuro: R hemiparesis. CTA head: 1. Left middle cerebral artery branch occlusion. 2. Distal left anterior cerebral artery occlusion. 3. Acute infarction left superior posterior frontal lobe and suggested early left lateral frontal lobe and insula infarction. CT head: Evolution of large left anterior and middle cerebral territory infarcts. No hemorrhagic transformation. Mild mass effect on the ventricular system without midline shift. No new areas of infarction. CXR 7/27: Increasing opacity throughout the lungs suggesting edema and/or airspace disease      A / P: Active Problems:  1. [facility] (7/24/2017): He is in ICU, s/p tPA. Neurology following. 2. Respiratory failure / CHF: Diurese as able. 3. PAF (paroxysmal atrial fibrillation) ([facility] (8/6/2013): Rate has been high today. Cardiology following. 4. Aspiration into airway (7/25/2017) with hypoxic resp failure: On oxygen. 5. DM type 2 (diabetes mellitus, type 2) ([facility] (10/28/2009): SSI. Has needed D50 on admission due to hypoglycemia. 6. CKD (chronic kidney disease) (3/2/2017)  7. HTN (hypertension) (10/28/2009): BP running low at times. Hold BP meds. Pressors if needed, unless declined by family. 8. DNR / DNI. Comfort care only. 9. Prognosis poor.

## 2017-07-28 NOTE — PROGRESS NOTES
Shift Summary:    Patient remains on NRM with sats of 80's. No family present overnight. Not following any commands. BP remains WNL. HR consistent between 120-140. Will continue to monitor. 8264 Bedside report given to Nicko Adams RN via [employer].

## 2017-07-28 NOTE — PROGRESS NOTES
1930 bedside shift report received from [facility] off going shift. Patient lethargic open eyes on command. No symptoms of pain or discomfort noted. 2200  Respiratory 30 pox 82 on non rebreather mask, suctioned and oral care rendered, medicated with morphine 4 mg with good response    2215 bedside shift report given to [address] RN to take over patient care .

## 2017-07-28 NOTE — PROGRESS NOTES
Primary Nurse Mandi Jerry RN and Jacqueline Molina RN performed a dual skin assessment on this patient No impairment noted  Trever score is 10

## 2017-07-28 NOTE — HOSPICE
[facility]  Note:     LCSW and Hospice RN (Ivory ARITA) met with pt and pts wife Richard Agarwal) to provide support and introduce. Pts wife stated she and pt have been  one year and met at their [employer] ([facility]) which pt has been a member of for over 36 years. Pts wife engaged in some life review and LCSW provided supportive listening. Pts wife stated this has been a such a sudden decline for pt but she understands that their is no other treatments available to pt at this time. Pts wife stated pts son had gone to [facility] to get some affairs for pt together and would be back later. Pts son is MPOA and hospice team plans to meet with pts son when he returns to do information visit about hospice services. LCSW will continue to monitor and assess needs.     Nader 1651    [phone]

## 2017-07-28 NOTE — PROGRESS NOTES
7/28/2017    INTENSIVIST PROGRESS NOTE:     Patient seen and evaluated. Admitted overnight with acute stroke, s/p TPA. Still with dense right hemiparesis. Had some respiratory distress but he vomited while on BiPAP. He remains obtunded today, with continued worsening oxygenation. Family has opted for comfort measures only. Visit Vitals    BP (!) 89/49    Pulse (!) 128    Temp 97.4 °F (36.3 °C)    Resp 24    Ht 5' 9\" (1.753 m)    Wt 65.5 kg (144 lb 6.4 oz)    SpO2 (!) 83%    BMI 21.32 kg/m2     GEN: ill appearing, mild respiratory distress  CV: RRR  Lungs: ronchi and rales  Neuro: dense right hemiparesis    A/P:  - Acute right MCA stroke, s/p TPA   - aspiration pneumonia - O2  - pulmonary edema (vs markedly worsening pneumonia)   - metabolic acidosis due to acute illness  - DM - insulin  - DVT prophylaxis  - patient with comfort measures only at this time.   Will move to oncology for easier family visitation  Leander Amezquita MD

## 2017-07-28 NOTE — PROGRESS NOTES
CM attended IDR's. Patient currently under comfort care treatment. Prognosis poor.     Ivan Norris RN, BSN, [facility]  ED Case Manager  [phone]

## 2017-07-28 NOTE — PROGRESS NOTES
, Hospitalist, Clive Llanos Supervisor and [employer] have all been contacted regarding patient's death at 36.

## 2017-07-28 NOTE — HOSPICE
[facility] follow up on consultation visit note    Order for consult noted. History and events of this hospitalization reviewed. Wife of one year   is in the room and support given by myself and by hospice LCSW Maximino Velazquez. Mrs. Gideon Garcia said son/MPOA dropped her off and was at the [employer] and then she said son was at [facility]. Britton Mejias. Wife is tearful at this visit. Patient assessed to be more comfortable after Morphine 4 mg IV given at 1100 and 1400 as well as Lorazepam 0.5 mg at 1400 and Haloperidol 4 mg IV at 1100. Also receiving Glycopyrrolate 0.2 mg IV q 4 hours scheduled. Will visit as the afternoon progresses to see if son Jeff Auguste III returns.     Kathie Paige RN [facility]

## 2017-07-28 NOTE — PROGRESS NOTES
8080 - Bedside verbal report received from off going shift. 12 - TRANSFER - OUT REPORT:    Verbal report given to [facility]  being [facility] to [facility](unit) for routine progression of care       Report consisted of patients Situation, Background, Assessment and   Recommendations(SBAR). Information from the following report(s) SBAR, Intake/Output, MAR, Recent Results and Med Rec Status was reviewed with the receiving nurse. Lines:   Peripheral IV 07/27/17 Right Forearm (Active)   Site Assessment Clean, dry, & intact 7/28/2017  8:00 AM   Phlebitis Assessment 0 7/28/2017  8:00 AM   Infiltration Assessment 0 7/28/2017  8:00 AM   Dressing Status Clean, dry, & intact 7/28/2017  8:00 AM   Dressing Type Transparent 7/28/2017  8:00 AM   Hub Color/Line Status Blue;Capped;Flushed 7/28/2017  8:00 AM   Action Taken Open ports on tubing capped 7/28/2017  4:00 AM   Alcohol Cap Used Yes 7/28/2017  8:00 AM        Opportunity for questions and clarification was provided.       Patient transported with:   Patient-specific medications from Pharmacy  [facility]

## 2017-07-28 NOTE — PROGRESS NOTES
Provided support to Dave Ana Raya. Provided prayer at bedside. Lori Alicea M.Div.   Palliative  Fellow

## 2017-07-28 NOTE — HOSPICE
[facility]  follow up on consultation visit note    Order for consult noted. History and events of this hospitalization reviewed. TC to son Frank Bah III to offer support. Pam Boboant said he would be at [facility] after he went by the [facility] to let them know his father was so ill. Advised we will meet when he arrives. 1240  Returned to the room  Unit nurse said son had been here and was going to get patient's wife at this point. Will follow up. Thank you for asking us to be a part of the care for this gentleman and his family.       Scooby Loomis RN

## 2017-08-01 NOTE — DISCHARGE SUMMARY
Physician Discharge Summary     Patient ID:  Hazel Pelaez  [id number]  19 y.o.  8/5/1925    Admit date: 7/24/2017    Discharge date and time: 7/28/2017    Admission Diagnoses: CVA (cerebral vascular accident) [facility])    Discharge Diagnoses:  Principal Diagnosis CVA                                              Active Problems:    PAF (paroxysmal atrial fibrillation) ([facility] (8/6/2013)      CVA (cerebral vascular accident) ([facility] [address] [address] (7/24/2017)      Aspiration into airway (7/25/2017)      Weakness (7/26/2017)      Debility (7/26/2017)      Counseling regarding advanced care planning and goals of care (7/26/2017)       Consults: Cardiology, Pulmonary/Intensive care, Neurology and Palliative Care    Significant Diagnostic Studies:     CTA head: 1. Left middle cerebral artery branch occlusion. 2. Distal left anterior cerebral artery occlusion. 3. Acute infarction left superior posterior frontal lobe and suggested early left lateral frontal lobe and insula infarction.     CT head: Evolution of large left anterior and middle cerebral territory infarcts. No hemorrhagic transformation. Mild mass effect on the ventricular system without midline shift. No new areas of infarction.     CXR 7/27: Increasing opacity throughout the lungs suggesting edema and/or airspace disease    Hospital Course: Mr. Hazel Pelaez. is a patient of mine who presented with the problems above. See the initial H and P for full details. Ralph Cotto is a 80 y.o.  male with known history as listed below presents to ED with complaint noted above. Available records were reviewed at the time of H&P. Patient with last known USOH at 1700 when interactive with neighbors and \"appeared himself\". Wife notes coming home from work at 330-4pm and he was more tired and \"slow\" in speech and activity (unusual for him) but he got some better. He stated he did not feel \"right\" at that time.  Report from wife was that he was on the couch and became little to not responsive. Wife went to get neighbors - he was \"slumped\". +upper airway gurgling. EMS arrived and bs stable 104, BP elevated. ED code S called. Hemiparetic right with right facial droop, no speech. supposedly followed some minimal commands and tele neuro concern for gaze preference. Could not be walked safely, was gurgling with hypoxia on O2. CT head no findings TPA administered by discussion with tele Neuro and ED MD. Post TPA we were contacted. bp has fluctuated systolic 14'J - 307'R. BS has dropped. Son in room, is mPOA. He notes patient is DNR/DNI. We were asked to admit for work up and evaluation of the above problems. By problem. .. CVA (cerebral vascular accident) ([facility] (2017): He was admitted to ICU, given tPA. Neurology has been following. Unfortunately, he remained neurologically devastated. Respiratory failure / CHF: Diuresed. PAF (paroxysmal atrial fibrillation) ([facility] (2013): Rate has been high at times. Cardiology was consulted. Aspiration into airway (2017) with hypoxic resp failure: He was placed on oxygen. DM type 2 (diabetes mellitus, type 2) ([facility] (10/28/2009): SSI. He needed D50 on admission due to hypoglycemia. CKD (chronic kidney disease) (3/2/2017)    Discussion with family and palliative care yielded a decision that he be comfort care / hospice. He  peacefully in bed. Disposition: . Cause of death: Stroke.        Signed:  Michele Becerra MD  2017  10:16 AM

## 2022-02-10 NOTE — PROGRESS NOTES
Bedside and Verbal shift change report given to Corina Mueller by Ale Souza RN (offgoing nurse). Report included the following information SBAR, Kardex, Intake/Output, MAR, Recent Results and Cardiac Rhythm Paced. Afib. Pt seen and assessed by provider     Evelia Reyes RN  02/10/22 [id number]
